# Patient Record
Sex: MALE | Race: WHITE | NOT HISPANIC OR LATINO | Employment: FULL TIME | URBAN - NONMETROPOLITAN AREA
[De-identification: names, ages, dates, MRNs, and addresses within clinical notes are randomized per-mention and may not be internally consistent; named-entity substitution may affect disease eponyms.]

---

## 2017-06-19 ENCOUNTER — OFFICE VISIT (OUTPATIENT)
Dept: RETAIL CLINIC | Facility: CLINIC | Age: 47
End: 2017-06-19

## 2017-06-19 VITALS
DIASTOLIC BLOOD PRESSURE: 95 MMHG | SYSTOLIC BLOOD PRESSURE: 134 MMHG | OXYGEN SATURATION: 97 % | TEMPERATURE: 98.2 F | HEART RATE: 92 BPM

## 2017-06-19 DIAGNOSIS — B37.0 ORAL THRUSH: Primary | ICD-10-CM

## 2017-06-19 LAB
EXPIRATION DATE: NORMAL
INTERNAL CONTROL: NORMAL
Lab: NORMAL
S PYO AG THROAT QL: NEGATIVE

## 2017-06-19 PROCEDURE — 99202 OFFICE O/P NEW SF 15 MIN: CPT | Performed by: NURSE PRACTITIONER

## 2017-06-19 PROCEDURE — 87880 STREP A ASSAY W/OPTIC: CPT | Performed by: NURSE PRACTITIONER

## 2017-06-19 NOTE — PROGRESS NOTES
Subjective   Tyler Fallon is a 46 y.o. male.     Sore Throat    This is a new problem. The current episode started 1 to 4 weeks ago (2 weeks). The problem has been gradually worsening. There has been no fever. Associated symptoms include congestion and coughing. Pertinent negatives include no diarrhea, headaches or vomiting. He has had no exposure to strep or mono. Treatments tried: Salt water gargles. The treatment provided no relief.        The following portions of the patient's history were reviewed and updated as appropriate: allergies, current medications, past family history, past medical history, past social history, past surgical history and problem list.    Review of Systems   Constitutional: Negative for fever.   HENT: Positive for congestion, postnasal drip and sore throat. Negative for sinus pressure.    Eyes: Negative for discharge and redness.   Respiratory: Positive for cough. Negative for chest tightness.    Cardiovascular: Negative for chest pain and palpitations.   Gastrointestinal: Negative for diarrhea, nausea and vomiting.   Allergic/Immunologic: Negative for environmental allergies.   Neurological: Negative for headaches.       Objective   Physical Exam   Constitutional: He appears well-developed and well-nourished. No distress.   HENT:   Right Ear: Tympanic membrane is not erythematous and not bulging.   Left Ear: Tympanic membrane is not erythematous and not bulging.   Mouth/Throat: Oral lesions present. No uvula swelling. Posterior oropharyngeal erythema present. No oropharyngeal exudate. Tonsils are 0 on the right. Tonsils are 0 on the left. No tonsillar exudate.   White patches to uvula, soft palate, and tongue.    Neck: Neck supple.   Cardiovascular: Normal rate, regular rhythm and normal heart sounds.  Exam reveals no gallop and no friction rub.    No murmur heard.  Pulmonary/Chest: Effort normal and breath sounds normal. No respiratory distress. He has no wheezes. He has no rales.  "  Lymphadenopathy:     He has no cervical adenopathy.   Neurological: He is alert.   Skin: Skin is warm. He is not diaphoretic.   Psychiatric: He has a normal mood and affect. His behavior is normal.       Assessment/Plan   Tyler was seen today for sore throat.    Diagnoses and all orders for this visit:    Oral thrush  -     POCT rapid strep A    Other orders  -     nystatin (MYCOSTATIN) 108350 UNIT/ML suspension; Swish and swallow 5 mL 4 (Four) Times a Day for 7 days.    Questioned patient regarding any inhaler use.  States his mother gave him a \"predisone inhaler\" for his asthma.  Has not been rinsing mouth.      Must rinse mouth out after using ANY steroid inhalers  If symptoms persist, or begin to worsen follow up with Urgent Care at hospital.       "

## 2017-06-19 NOTE — PATIENT INSTRUCTIONS
Must rinse mouth out after using ANY steroid inhalers  If symptoms persist, or begin to worsen follow up with Urgent Care at hospital.      Thrush, Adult  Thrush, also called oral candidiasis, is a fungal infection that develops in the mouth and throat and on the tongue. It causes white patches to form on the mouth and tongue. Thrush is most common in older adults, but it can occur at any age.   Many cases of thrush are mild, but this infection can also be more serious. Thrush can be a recurring problem for people who have chronic illnesses or who take medicines that limit the body's ability to fight infection. Because these people have difficulty fighting infections, the fungus that causes thrush can spread throughout the body. This can cause life-threatening blood or organ infections.  CAUSES   Thrush is usually caused by a yeast called Evelyn albicans. This fungus is normally present in small amounts in the mouth and on other mucous membranes. It usually causes no harm. However, when conditions are present that allow the fungus to grow uncontrolled, it invades surrounding tissues and becomes an infection. Less often, other Candida species can also lead to thrush.   RISK FACTORS  Thrush is more likely to develop in the following people:  · People with an impaired ability to fight infection (weakened immune system).    · Older adults.    · People with HIV.    · People with diabetes.    · People with dry mouth (xerostomia).    · Pregnant women.    · People with poor dental care, especially those who have false teeth.    · People who use antibiotic medicines.    SIGNS AND SYMPTOMS   Thrush can be a mild infection that causes no symptoms. If symptoms develop, they may include:   · A burning feeling in the mouth and throat. This can occur at the start of a thrush infection.    · White patches that adhere to the mouth and tongue. The tissue around the patches may be red, raw, and painful. If rubbed (during tooth  brushing, for example), the patches and the tissue of the mouth may bleed easily.    · A bad taste in the mouth or difficulty tasting foods.    · Cottony feeling in the mouth.    · Pain during eating and swallowing.  DIAGNOSIS   Your health care provider can usually diagnose thrush by looking in your mouth and asking you questions about your health.   TREATMENT   Medicines that help prevent the growth of fungi (antifungals) are the standard treatment for thrush. These medicines are either applied directly to the affected area (topical) or swallowed (oral). The treatment will depend on the severity of the condition.   Mild Thrush  Mild cases of thrush may clear up with the use of an antifungal mouth rinse or lozenges. Treatment usually lasts about 14 days.   Moderate to Severe Thrush  · More severe thrush infections that have spread to the esophagus are treated with an oral antifungal medicine. A topical antifungal medicine may also be used.    · For some severe infections, a treatment period longer than 14 days may be needed.    · Oral antifungal medicines are almost never used during pregnancy because the fetus may be harmed. However, if a pregnant woman has a rare, severe thrush infection that has spread to her blood, oral antifungal medicines may be used. In this case, the risk of harm to the mother and fetus from the severe thrush infection may be greater than the risk posed by the use of antifungal medicines.    Persistent or Recurrent Thrush  For cases of thrush that do not go away or keep coming back, treatment may involve the following:   · Treatment may be needed twice as long as the symptoms last.    · Treatment will include both oral and topical antifungal medicines.    · People with weakened immune systems can take an antifungal medicine on a continuous basis to prevent thrush infections.    It is important to treat conditions that make you more likely to get thrush, such as diabetes or HIV.   HOME CARE  INSTRUCTIONS   ·  Only take over-the-counter or prescription medicine as directed by your health care provider. Talk to your health care provider about an over-the-counter medicine called gentian violet, which kills bacteria and fungi.    · Eat plain, unflavored yogurt as directed by your health care provider. Check the label to make sure the yogurt contains live cultures. This yogurt can help healthy bacteria grow in the mouth that can stop the growth of the fungus that causes thrush.    · Try these measures to help reduce the discomfort of thrush:      Drink cold liquids such as water or iced tea.      Try flavored ice treats or frozen juices.      Eat foods that are easy to swallow, such as gelatin, ice cream, or custard.      If the patches in your mouth are painful, try drinking from a straw.    · Rinse your mouth several times a day with a warm saltwater rinse. You can make the saltwater mixture with 1 tsp (6 g) of salt in 8 fl oz (0.2 L) of warm water.  · If you wear dentures, remove the dentures before going to bed, brush them vigorously, and soak them in a cleaning solution as directed by your health care provider.    · Women who are breastfeeding should clean their nipples with an antifungal medicine as directed by their health care provider. Dry the nipples after breastfeeding. Applying lanolin-containing body lotion may help relieve nipple soreness.    SEEK MEDICAL CARE IF:  · Your symptoms are getting worse or are not improving within 7 days of starting treatment.    · You have symptoms of spreading infection, such as white patches on the skin outside of the mouth.    · You are nursing and you have redness, burning, or pain in the nipples that is not relieved with treatment.    MAKE SURE YOU:  · Understand these instructions.  · Will watch your condition.  · Will get help right away if you are not doing well or get worse.     This information is not intended to replace advice given to you by your health  care provider. Make sure you discuss any questions you have with your health care provider.     Document Released: 09/12/2005 Document Revised: 04/10/2017 Document Reviewed: 07/21/2014  [employer] Interactive Patient Education ©2017 [employer]

## 2018-02-08 ENCOUNTER — APPOINTMENT (OUTPATIENT)
Dept: GENERAL RADIOLOGY | Facility: HOSPITAL | Age: 48
End: 2018-02-08

## 2018-02-08 ENCOUNTER — HOSPITAL ENCOUNTER (EMERGENCY)
Facility: HOSPITAL | Age: 48
Discharge: HOME | End: 2018-02-08
Admitting: EMERGENCY MEDICINE

## 2018-02-08 VITALS
OXYGEN SATURATION: 99 % | HEIGHT: 70 IN | WEIGHT: 174 LBS | DIASTOLIC BLOOD PRESSURE: 94 MMHG | HEART RATE: 90 BPM | RESPIRATION RATE: 14 BRPM | BODY MASS INDEX: 24.91 KG/M2 | SYSTOLIC BLOOD PRESSURE: 137 MMHG | TEMPERATURE: 97.4 F

## 2018-02-08 DIAGNOSIS — S39.012A STRAIN OF LUMBAR REGION, INITIAL ENCOUNTER: Primary | ICD-10-CM

## 2018-02-08 PROCEDURE — 99283 EMERGENCY DEPT VISIT LOW MDM: CPT

## 2018-02-08 PROCEDURE — 72110 X-RAY EXAM L-2 SPINE 4/>VWS: CPT

## 2018-02-08 RX ORDER — TRAMADOL HYDROCHLORIDE 50 MG/1
50 TABLET ORAL EVERY 6 HOURS PRN
Qty: 15 TABLET | Refills: 0 | Status: SHIPPED | OUTPATIENT
Start: 2018-02-08 | End: 2018-02-08 | Stop reason: HOSPADM

## 2018-02-08 RX ORDER — CYCLOBENZAPRINE HCL 10 MG
10 TABLET ORAL 3 TIMES DAILY PRN
Qty: 20 TABLET | Refills: 0 | Status: SHIPPED | OUTPATIENT
Start: 2018-02-08 | End: 2018-02-08 | Stop reason: HOSPADM

## 2018-02-08 RX ORDER — NAPROXEN 500 MG/1
500 TABLET ORAL 2 TIMES DAILY PRN
Qty: 20 TABLET | Refills: 0 | Status: SHIPPED | OUTPATIENT
Start: 2018-02-08 | End: 2018-02-08 | Stop reason: HOSPADM

## 2018-02-08 NOTE — DISCHARGE INSTRUCTIONS
Return to ER if symptoms worsen   Ice to area for 24 hours, then moist heat compresses three times a day for 15-20 min intervals

## 2018-02-08 NOTE — ED NOTES
Pt states he hurt his back last week. Pt states he slipped in the mud. And back has been worse since. Pt denies numbness or tingling in hands or feet.     Verito Matos  02/08/18 1732       Verito Matos  02/08/18 1738

## 2018-02-08 NOTE — ED PROVIDER NOTES
Subjective   HPI Comments: Patient is a 47-year-old white male presents with low back pain.  He states he initially injured his back about a week ago when he slipped in some mud and twisted his back.  He states his back improved at that time and then 2 days ago he pulled his back again while climbing a ladder.  He did not fall.  He denies radicular symptoms.  He denies any incontinence of urine or stool.  He states that his pain is mostly in the muscles of the lower back.  He denies any other injury.    Patient is a 47 y.o. male presenting with back pain.   History provided by:  Patient   used: No    Back Pain       Review of Systems   Constitutional: Negative.    HENT: Negative.    Eyes: Negative.    Respiratory: Negative.    Cardiovascular: Negative.    Gastrointestinal: Negative.    Endocrine: Negative.    Genitourinary: Negative.    Musculoskeletal: Positive for back pain.        Patient is a 47-year-old white male presents with low back pain.  He states he initially injured his back about a week ago when he slipped in some mud and twisted his back.  He states his back improved at that time and then 2 days ago he pulled his back again while climbing a ladder.  He did not fall.  He denies radicular symptoms.  He denies any incontinence of urine or stool.  He states that his pain is mostly in the muscles of the lower back.  He denies any other injury.     Skin: Negative.    Allergic/Immunologic: Negative.    Neurological: Negative.    Hematological: Negative.    Psychiatric/Behavioral: Negative.    All other systems reviewed and are negative.      Past Medical History:   Diagnosis Date   • Acid reflux    • Asthma        Allergies   Allergen Reactions   • Eggs Or Egg-Derived Products Swelling       Past Surgical History:   Procedure Laterality Date   • KNEE ACL RECONSTRUCTION         Family History   Problem Relation Age of Onset   • Cancer Father    • Stroke Other        Social History  "    Social History   • Marital status:      Spouse name: N/A   • Number of children: N/A   • Years of education: N/A     Social History Main Topics   • Smoking status: Heavy Tobacco Smoker     Packs/day: 0.50     Types: Cigarettes   • Smokeless tobacco: None   • Alcohol use No   • Drug use: No   • Sexual activity: Defer     Other Topics Concern   • None     Social History Narrative       Prior to Admission medications    Not on File       /94 (BP Location: Right arm, Patient Position: Sitting)  Pulse 90  Temp 97.4 °F (36.3 °C) (Temporal Artery )   Resp 14  Ht 177.8 cm (70\")  Wt 78.9 kg (174 lb)  SpO2 99%  BMI 24.97 kg/m2    Objective   Physical Exam   Constitutional: He is oriented to person, place, and time. He appears well-developed and well-nourished.   HENT:   Head: Normocephalic and atraumatic.   Eyes: Conjunctivae and EOM are normal. Pupils are equal, round, and reactive to light.   Neck: Normal range of motion. Neck supple.   Cardiovascular: Normal rate, regular rhythm, normal heart sounds and intact distal pulses.    Pulmonary/Chest: Effort normal and breath sounds normal.   Abdominal: Soft. Bowel sounds are normal.   Musculoskeletal:   Moderate tenderness on palpation of paraspinal muscles of lower lumbar spine. No stepoff or laxity noted. dtrs intact.    Neurological: He is alert and oriented to person, place, and time. He has normal reflexes.   Skin: Skin is warm and dry.   Psychiatric: He has a normal mood and affect. His behavior is normal. Judgment and thought content normal.   Nursing note and vitals reviewed.      Procedures         Lab Results (last 24 hours)     ** No results found for the last 24 hours. **          XR Spine Lumbar 4+ View   ED Interpretation   Negative for anything acute- reviewed with dr he      Final Result   No acute osseous abnormality.   This report was finalized on 02/08/2018 17:50 by  Ronnell De La Cruz, .          ED Course  ED Course   Comment By Time "   Reviewed results with pt - will be discharged home shortly in stable condition to follow up with pcp in 3 days if no improvement- advised to return before if symptoms worsen Veronica Cueto, JAYLYN 02/08 1734   Went to discharge pt and he states that he has esophageal stricture and is unable to swallow pills. Will d/c prescriptions for ultram, naprosyn, and flexeril Veronica Cueto, APRN 02/08 1743          MDM  Number of Diagnoses or Management Options  Strain of lumbar region, initial encounter: minor     Amount and/or Complexity of Data Reviewed  Tests in the radiology section of CPT®: ordered and reviewed  Independent visualization of images, tracings, or specimens: yes    Patient Progress  Patient progress: stable      Final diagnoses:   Strain of lumbar region, initial encounter          JAYLYN Palacio  02/08/18 3313

## 2019-09-18 ENCOUNTER — HOSPITAL ENCOUNTER (EMERGENCY)
Facility: HOSPITAL | Age: 49
Discharge: HOME | End: 2019-09-18
Admitting: INTERNAL MEDICINE

## 2019-09-18 VITALS
WEIGHT: 148 LBS | HEIGHT: 70 IN | OXYGEN SATURATION: 98 % | SYSTOLIC BLOOD PRESSURE: 144 MMHG | HEART RATE: 78 BPM | RESPIRATION RATE: 17 BRPM | BODY MASS INDEX: 21.19 KG/M2 | DIASTOLIC BLOOD PRESSURE: 91 MMHG | TEMPERATURE: 98.2 F

## 2019-09-18 DIAGNOSIS — R13.10 DYSPHAGIA, UNSPECIFIED TYPE: Primary | ICD-10-CM

## 2019-09-18 LAB
ALBUMIN SERPL-MCNC: 4.4 G/DL (ref 3.5–5.2)
ALBUMIN/GLOB SERPL: 1.6 G/DL
ALP SERPL-CCNC: 87 U/L (ref 39–117)
ALT SERPL W P-5'-P-CCNC: 8 U/L (ref 1–41)
ANION GAP SERPL CALCULATED.3IONS-SCNC: 10 MMOL/L (ref 5–15)
AST SERPL-CCNC: 11 U/L (ref 1–40)
BASOPHILS # BLD AUTO: 0.07 10*3/MM3 (ref 0–0.2)
BASOPHILS NFR BLD AUTO: 1 % (ref 0–1.5)
BILIRUB SERPL-MCNC: 0.4 MG/DL (ref 0.2–1.2)
BUN BLD-MCNC: 11 MG/DL (ref 6–20)
BUN/CREAT SERPL: 9.1 (ref 7–25)
CALCIUM SPEC-SCNC: 9.4 MG/DL (ref 8.6–10.5)
CHLORIDE SERPL-SCNC: 101 MMOL/L (ref 98–107)
CK MB SERPL-CCNC: 1.11 NG/ML
CO2 SERPL-SCNC: 29 MMOL/L (ref 22–29)
CREAT BLD-MCNC: 1.21 MG/DL (ref 0.76–1.27)
DEPRECATED RDW RBC AUTO: 38.4 FL (ref 37–54)
EOSINOPHIL # BLD AUTO: 0.26 10*3/MM3 (ref 0–0.4)
EOSINOPHIL NFR BLD AUTO: 3.5 % (ref 0.3–6.2)
ERYTHROCYTE [DISTWIDTH] IN BLOOD BY AUTOMATED COUNT: 11.7 % (ref 12.3–15.4)
GFR SERPL CREATININE-BSD FRML MDRD: 65 ML/MIN/1.73
GLOBULIN UR ELPH-MCNC: 2.8 GM/DL
GLUCOSE BLD-MCNC: 109 MG/DL (ref 65–99)
HCT VFR BLD AUTO: 46.4 % (ref 37.5–51)
HGB BLD-MCNC: 16.1 G/DL (ref 13–17.7)
IMM GRANULOCYTES # BLD AUTO: 0.01 10*3/MM3 (ref 0–0.05)
IMM GRANULOCYTES NFR BLD AUTO: 0.1 % (ref 0–0.5)
LYMPHOCYTES # BLD AUTO: 2.36 10*3/MM3 (ref 0.7–3.1)
LYMPHOCYTES NFR BLD AUTO: 32.1 % (ref 19.6–45.3)
MCH RBC QN AUTO: 31.2 PG (ref 26.6–33)
MCHC RBC AUTO-ENTMCNC: 34.7 G/DL (ref 31.5–35.7)
MCV RBC AUTO: 89.9 FL (ref 79–97)
MONOCYTES # BLD AUTO: 0.62 10*3/MM3 (ref 0.1–0.9)
MONOCYTES NFR BLD AUTO: 8.4 % (ref 5–12)
NEUTROPHILS # BLD AUTO: 4.03 10*3/MM3 (ref 1.7–7)
NEUTROPHILS NFR BLD AUTO: 54.9 % (ref 42.7–76)
NRBC BLD AUTO-RTO: 0 /100 WBC (ref 0–0.2)
PLATELET # BLD AUTO: 264 10*3/MM3 (ref 140–450)
PMV BLD AUTO: 9.7 FL (ref 6–12)
POTASSIUM BLD-SCNC: 4.1 MMOL/L (ref 3.5–5.2)
PROT SERPL-MCNC: 7.2 G/DL (ref 6–8.5)
RBC # BLD AUTO: 5.16 10*6/MM3 (ref 4.14–5.8)
SODIUM BLD-SCNC: 140 MMOL/L (ref 136–145)
WBC NRBC COR # BLD: 7.35 10*3/MM3 (ref 3.4–10.8)

## 2019-09-18 PROCEDURE — 25010000002 GLUCAGON (HUMAN RECOMBINANT) 1 MG RECONSTITUTED SOLUTION: Performed by: PHYSICIAN ASSISTANT

## 2019-09-18 PROCEDURE — 82553 CREATINE MB FRACTION: CPT | Performed by: PHYSICIAN ASSISTANT

## 2019-09-18 PROCEDURE — 96372 THER/PROPH/DIAG INJ SC/IM: CPT

## 2019-09-18 PROCEDURE — 85025 COMPLETE CBC W/AUTO DIFF WBC: CPT | Performed by: PHYSICIAN ASSISTANT

## 2019-09-18 PROCEDURE — 80053 COMPREHEN METABOLIC PANEL: CPT | Performed by: PHYSICIAN ASSISTANT

## 2019-09-18 PROCEDURE — 99283 EMERGENCY DEPT VISIT LOW MDM: CPT

## 2019-09-18 RX ORDER — SODIUM CHLORIDE 0.9 % (FLUSH) 0.9 %
10 SYRINGE (ML) INJECTION AS NEEDED
Status: DISCONTINUED | OUTPATIENT
Start: 2019-09-18 | End: 2019-09-18 | Stop reason: HOSPADM

## 2019-09-18 RX ADMIN — GLUCAGON HYDROCHLORIDE 1 MG: KIT at 19:32

## 2019-09-18 RX ADMIN — SODIUM CHLORIDE 1000 ML: 9 INJECTION, SOLUTION INTRAVENOUS at 19:25

## 2019-09-18 NOTE — ED PROVIDER NOTES
Subjective   History of Present Illness    Patient is a 48-year-old male presenting to ED with difficulty swallowing.  Patient stated over the past 3 to 4 weeks he has had progressive difficulty with swallowing.  Patient noted he has a history of an esophageal stricture 20 years ago at which time he had a piece of chicken stuck in his throat.  Patient reported they were able to go in and pull the chicken out and he does not think he needed any dilation at that time.  Patient denies any complications or problems since then.  Patient does not regularly follow-up with GI and does not have an established PCP.  Patient stated 3  weeks ago he started to feel pain with swallowing solid foods.  Patient switched to a soft food diet including overcooked pasta and eggs until he was unable to swallow these as well.  Patient reported he has been able to tolerate PO liquids and denies any vomiting. Patient described that with his previous stricture he felt the food getting caught in his throat. Patient noted he now feels like the food and liquids are getting caught in the throat, in his mid sternum, and in his epigastric region.  Patient described he feels like when he swallows in these 3 areas the food or liquid slows down.  Patient has been able to tolerate putting in fluids which she has been taking to work.  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  Patient describes he is feeling weak and shaky and believes that he is becoming dehydrated. Patient reports his appetite has not decreased, he is just nervous to advanced to solid foods.     Patient denies any other chest pains, SOB, fevers, recent illness, bowel changes, or decreased urination. Patient denies any muscle cramping. Patient denies trying any medications at home to alleviate symptoms.     Review of Systems   Constitutional: Negative for activity  change, appetite change, chills and fever.   HENT: Positive for trouble swallowing. Negative for congestion and sore throat.    Eyes: Negative.    Respiratory: Negative.  Negative for cough, choking, chest tightness and shortness of breath.    Cardiovascular: Positive for chest pain (mid sub sternal).   Gastrointestinal: Positive for abdominal pain (epigastric region with swallowing only) and nausea. Negative for constipation, diarrhea and vomiting.   Genitourinary: Negative.  Negative for decreased urine volume, difficulty urinating, dysuria and flank pain.   Musculoskeletal: Negative.  Negative for back pain, neck pain and neck stiffness.   Skin: Negative.  Negative for rash and wound.   Neurological: Positive for weakness (generalized). Negative for dizziness, syncope and headaches.   Psychiatric/Behavioral: Negative.        Past Medical History:   Diagnosis Date   • Acid reflux    • Asthma        Allergies   Allergen Reactions   • Eggs Or Egg-Derived Products Swelling       Past Surgical History:   Procedure Laterality Date   • KNEE ACL RECONSTRUCTION         Family History   Problem Relation Age of Onset   • Cancer Father    • Stroke Other        Social History     Socioeconomic History   • Marital status:      Spouse name: Not on file   • Number of children: Not on file   • Years of education: Not on file   • Highest education level: Not on file   Tobacco Use   • Smoking status: Heavy Tobacco Smoker     Packs/day: 0.50     Types: Cigarettes   Substance and Sexual Activity   • Alcohol use: No   • Drug use: No   • Sexual activity: Defer           Objective   Physical Exam   Constitutional: He is oriented to person, place, and time. Vital signs are normal. He appears well-developed and well-nourished. He is cooperative. He is easily aroused.  Non-toxic appearance. No distress.   HENT:   Head: Normocephalic and atraumatic.   Right Ear: External ear normal.   Left Ear: External ear normal.   Mouth/Throat:  Uvula is midline. Mucous membranes are dry. No oropharyngeal exudate, posterior oropharyngeal edema, posterior oropharyngeal erythema or tonsillar abscesses. No tonsillar exudate.   Eyes: Conjunctivae and EOM are normal. Pupils are equal, round, and reactive to light. Right eye exhibits no nystagmus. Left eye exhibits no nystagmus.   Neck: Normal range of motion. Neck supple. No spinous process tenderness and no muscular tenderness present.   Cardiovascular: Normal rate, regular rhythm, normal heart sounds and normal pulses.   No murmur heard.  Pulses:       Radial pulses are 2+ on the right side, and 2+ on the left side.        Dorsalis pedis pulses are 2+ on the right side, and 2+ on the left side.        Posterior tibial pulses are 2+ on the right side, and 2+ on the left side.   Pulmonary/Chest: Effort normal and breath sounds normal. He has no decreased breath sounds. He has no wheezes. He has no rhonchi. He has no rales. He exhibits no tenderness.   Abdominal: Soft. Normal appearance and bowel sounds are normal. There is no tenderness. There is no rigidity, no rebound, no guarding and no CVA tenderness.   Musculoskeletal:   No cervical, thoracic, or lumbar spinal tenderness; no paraspinal muscular tenderness; no CVAT; full ROM of all four extremities; no calf tenderness   Neurological: He is alert, oriented to person, place, and time and easily aroused. He has normal strength. Gait normal.   Skin: Skin is warm, dry and intact. Capillary refill takes less than 2 seconds. No rash noted. He is not diaphoretic.   Psychiatric: His speech is normal and behavior is normal. Judgment and thought content normal. Cognition and memory are normal.   Slightly anxious upon initial exam, pacing around room but will cooperate easily when asked   Nursing note and vitals reviewed.      Procedures           ED Course  ED Course as of Sep 19 0309   Wed Sep 18, 2019   1910 Discussed with Dr. Daniel Narayan patient's initial  "evaluation and treatment plan.  Dr. Narayan recommends adding on a CK at this time to help rule out rhabdomyolysis secondary to dehydration.  Dr. Narayan otherwise agrees with treatment plan.  [JS]   2038 Patient resting in room reporting he feels better after the glucagon and IV fluids. Patient able to tolerate PO gatorade and keep the liquids down. Patient noted he feels it gets \"caught or slowed\" in the epigastric region. Patient denies any nausea or dry heaving associated with the PO intake. Discussed with patient lab results. Updated Dr. Narayan on patient's improvement and ability to tolerate PO fluids. Dr. Narayan agrees with ability to discharge home at this time with PCP follow up to establish GI care with need for endoscopy.   [JS]   2115 Patient able to tolerate more PO liquids with no nausea or vomiting. Discussed with patient need for PCP and GI follow up, return precautions, and ability to return to ED as needed. Discussed with patient soft food alternatives other than pudding. Patient with no concerns at this time and feels comfortable going home. Will discharge.   [JS]      ED Course User Index  [JS] Efrain Rodriguez PA-C                  [facility]    Final diagnoses:   Dysphagia, unspecified type              Efrain Rodriguez PA-C  09/19/19 0309    "

## 2019-09-19 NOTE — DISCHARGE INSTRUCTIONS
Follow up with one of the [facility] physician groups below to setup primary care. If you have trouble making an appointment, please call the [facility] Nurse Line at [phone]    Dr. Arely Yen DO, Dr. Parish Carr DO, and JAYLYN Arana  [facility]  [address] [place] [address], [id number] [phone]    Dr. Carloz Hernandez MD  [facility] - [address] [address], [address], [place], [place] [id number] [phone]    Dr. Adalberto Flynn DO, Dr. Andrew Billy DO,  JAYLYN Hylton, and JAYLYN Suh  [facility] - [address] [address], [address], [place], [place] [id number] [phone]     Dr. Magaly Velarde MD, and JAYLYN Henderson  [address] [id number] [phone]    Dr. Roger Campbell MD and Dr. Jesse Lemon MD  [address], [address] [phone]    Dr. Jhonny Lerma MD  [facility]  [address] [place] [address], [facility] [place], [address] [phone]    Dr. Bernard Reed MD  [facility]  [address], [place], [address] [phone]              Dysphagia    Dysphagia is trouble swallowing. This condition occurs when solids and liquids stick in a person's throat on the way down to the stomach, or when food takes longer to get to the stomach. You may have problems swallowing food, liquids, or both. You may also have pain while trying to swallow. It may take you more time and effort to swallow something.  What are the causes?  This condition is caused by:  · Problems with the muscles. They may make it difficult for you to move food and liquids through the tube that connects your  mouth to your stomach (esophagus). You may have ulcers, scar tissue, or inflammation that blocks the normal passage of food and liquids. Causes of these problems include:  ? Acid reflux from your stomach into your esophagus (gastroesophageal reflux).  ? Infections.  ? Radiation treatment for cancer.  ? Medicines taken without enough fluids to wash them down into your stomach.  · Nerve problems. These prevent signals from being sent to the muscles of your esophagus to squeeze (contract) and move what you swallow down to your stomach.  · Globus pharyngeus. This is a common problem that involves feeling like something is stuck in the throat or a sense of trouble with swallowing even though nothing is wrong with the swallowing passages.  · Stroke. This can affect the nerves and make it difficult to swallow.  · Certain conditions, such as cerebral palsy or Parkinson disease.  What are the signs or symptoms?  Common symptoms of this condition include:  · A feeling that solids or liquids are stuck in your throat on the way down to the stomach.  · Food taking too long to get to the stomach.  Other symptoms include:  · Food moving back from your stomach to your mouth (regurgitation).  · Noises coming from your throat.  · Chest discomfort with swallowing.  · A feeling of fullness when swallowing.  · Drooling, especially when the throat is blocked.  · Pain while swallowing.  · Heartburn.  · Coughing or gagging while trying to swallow.  How is this diagnosed?  This condition is diagnosed by:  · Barium X-ray. In this test, you swallow a white substance (contrast medium)that sticks to the inside of your esophagus. X-ray images are then taken.  · Endoscopy. In this test, a flexible telescope is inserted down your throat to look at your esophagus and your stomach.  · CT scans and MRI.  How is this treated?  Treatment for dysphagia depends on the cause of the condition:  · If the dysphagia is caused by acid reflux or infection,  medicines may be used. They may include antibiotics and heartburn medicines.  · If the dysphagia is caused by problems with your muscles, swallowing therapy may be used to help you strengthen your swallowing muscles. You may have to do specific exercises to strengthen the muscles or stretch them.  · If the dysphagia is caused by a blockage or mass, procedures to remove the blockage may be done. You may need surgery and a feeding tube.  You may need to make diet changes. Ask your health care provider for specific instructions.  Follow these instructions at home:  Eating and drinking  · Try to eat soft food that is easier to swallow.  · Follow any diet changes as told by your health care provider.  · Cut your food into small pieces and eat slowly.  · Eat and drink only when you are sitting upright.  · Do not drink alcohol or caffeine. If you need help quitting, ask your health care provider.  General instructions  · Check your weight every day to make sure you are not losing weight.  · Take over-the-counter and prescription medicines only as told by your health care provider.  · If you were prescribed an antibiotic medicine, take it as told by your health care provider. Do not stop taking the antibiotic even if you start to feel better.  · Do not use any products that contain nicotine or tobacco, such as cigarettes and e-cigarettes. If you need help quitting, ask your health care provider.  · Keep all follow-up visits as told by your health care provider. This is important.  Contact a health care provider if:  · You lose weight because you cannot swallow.  · You cough when you drink liquids (aspiration).  · You cough up partially digested food.  Get help right away if:  · You cannot swallow your saliva.  · You have shortness of breath or a fever, or both.  · You have a hoarse voice and also have trouble swallowing.  Summary  · Dysphagia is trouble swallowing. This condition occurs when solids and liquids stick in a  person's throat on the way down to the stomach, or when food takes longer to get to the stomach.  · Dysphagia has many possible causes and symptoms.  · Treatment for dysphagia depends on the cause of the condition.  This information is not intended to replace advice given to you by your health care provider. Make sure you discuss any questions you have with your health care provider.  Document Released: 12/15/2001 Document Revised: 12/07/2017 Document Reviewed: 12/07/2017  [employer] Interactive Patient Education © 2019 [employer]      Dysphagia Eating Plan, Bite Size Food  This diet plan is for people with moderate swallowing problems who have transitioned from pureed and minced foods. Bite size foods are soft and cut into small chunks so that they can be swallowed safely. On this eating plan, you may be instructed to drink liquids that are thickened.  Work with your health care provider and your diet and nutrition specialist (dietitian) to make sure that you are following the diet safely and getting all the nutrients you need.  What are tips for following this plan?  General guidelines for foods    · You may eat foods that are tender, soft, and moist.  · Always test food texture before taking a bite. Poke food with a fork or spoon to make sure it is tender.  · Food should be easy to cut and shew. Avoid large pieces of food that require a lot of chewing.  · Take small bites. Each bite should be smaller than your thumb nail (about 15mm by 15 mm).  · If you were on pureed and minced food diet plans, you may eat any of the foods included in those diets.  · Avoid foods that are very dry, hard, sticky, chewy, coarse, or crunchy.  · If instructed by your health care provider, thicken liquids. Follow your health care provider's instructions for what products to use, how to do this, and to what thickness.  ? Your health care provider may recommend using a commercial thickener, rice cereal, or potato flakes. Ask your  health care provider to recommend thickeners.  ? Thickened liquids are usually a “pudding-like” consistency, or they may be as thick as honey or thick enough to eat with a spoon.  Cooking  · To moisten foods, you may add liquids while you are blending, mashing, or grinding your foods to the right consistency. These liquids include gravies, sauces, vegetable or fruit juice, milk, half and half, or water.  · Strain extra liquid from foods before eating.  · Reheat foods slowly to prevent a tough crust from forming.  · Prepare foods in advance.  Meal planning  · Eat a variety of foods to get all the nutrients you need.  · Some foods may be tolerated better than others. Work with your dietitian to identify which foods are safest for you to eat.  · Follow your meal plan as told by your dietitian.  What foods are allowed?  Grains  Moist breads without nuts or seeds. Biscuits, muffins, pancakes, and waffles that are well-moistened with syrup, jelly, margarine, or butter. Cooked cereals. Moist bread stuffing. Moist rice. Well-moistened cold cereal with small chunks. Well-cooked pasta, noodles, rice, and bread dressing in small pieces and thick sauce. Soft dumplings or spaetzle in small pieces and butter or gravy.  Vegetables  Soft, well-cooked vegetables in small pieces. Soft-cooked, mashed potatoes. Thickened vegetable juice.  Fruits  Canned or cooked fruits that are soft or moist and do not have skin or seeds. Fresh, soft bananas. Thickened fruit juices.  Meat and other protein foods  Tender, moist meats or poultry in small pieces. Moist meatballs or meatloaf. Fish without bones. Eggs or egg substitutes in small pieces. Tofu. Tempeh and meat alternatives in small pieces. Well-cooked, tender beans, peas, baked beans, and other legumes.  Dairy  Thickened milk. Cream cheese. Yogurt. Cottage cheese. Sour cream. Small pieces of soft cheese.  Fats and oils  Butter. Oils. Margarine. Mayonnaise. Gravy. Spreads.  Sweets and  desserts  Soft, smooth, moist desserts. Pudding. Custard. Moist cakes. Jam. Jelly. Honey. Preserves. Ask your health care provider whether you can have frozen desserts.  Seasoning and other foods  All seasonings and sweeteners. All sauces with small chunks. Prepared tuna, egg, or chicken salad without raw fruits or vegetables. Moist casseroles with small, tender pieces of meat. Soups with tender meat.  What foods are not allowed?  Grains  Coarse or dry cereals. Dry breads. Toast. Crackers. Tough, crusty breads, such as Jamaican bread and baguettes. Dry pancakes, waffles, and muffins. Sticky rice. Dry bread stuffing. Granola. Popcorn. Chips.  Vegetables  All raw vegetables. Cooked corn. Rubbery or stiff cooked vegetables. Stringy vegetables, such as celery. Tough, crisp fried potatoes. Potato skins.  Fruits  Hard, crunchy, stringy, high-pulp, and juicy raw fruits such as apples, pineapple, papaya, and watermelon. Small, round fruits, such as grapes. Dried fruit and fruit leather.  Meat and other protein foods  Large pieces of meat. Dry, tough meats, such as kang, sausage, and hot dogs. Chicken, turkey, or fish with skin and bones. Crunchy peanut butter. Nuts. Seeds. Nut and seed butters.  Dairy  Yogurt with nuts, seeds, or large chunks. Large chunks of cheese. Frozen desserts and milk consistency not allowed by your dietitian.  Sweets and desserts  Dry cakes. Chewy or dry cookies. Any desserts with nuts, seeds, dry fruits, coconut, pineapple, or anything dry, sticky, or hard. Chewy caramel. Licorice. Taffy-type candies. Ask your health care provider whether you can have frozen desserts.  Seasoning and other foods  Soups with tough or large chunks of meats, poultry, or vegetables. Corn or clam chowder. Smoothies with large chunks of fruit.  Summary  · Bite size foods can be helpful for people with moderate swallowing problems.  · On the dysphagia eating plan, you may eat foods that are soft, moist, and cut into pieces  smaller than 15mm by 15mm.  · You may be instructed to thicken liquids. Follow your health care provider's instructions about how to do this and to what consistency.  This information is not intended to replace advice given to you by your health care provider. Make sure you discuss any questions you have with your health care provider.  Document Released: 12/18/2006 Document Revised: 03/30/2018 Document Reviewed: 03/30/2018  [employer] Interactive Patient Education © 2019 [employer] Inc.

## 2019-09-25 ENCOUNTER — OFFICE VISIT (OUTPATIENT)
Dept: FAMILY MEDICINE CLINIC | Facility: CLINIC | Age: 49
End: 2019-09-25

## 2019-09-25 VITALS
BODY MASS INDEX: 21.56 KG/M2 | OXYGEN SATURATION: 98 % | TEMPERATURE: 97.7 F | HEART RATE: 80 BPM | DIASTOLIC BLOOD PRESSURE: 83 MMHG | RESPIRATION RATE: 18 BRPM | SYSTOLIC BLOOD PRESSURE: 122 MMHG | HEIGHT: 70 IN | WEIGHT: 150.6 LBS

## 2019-09-25 DIAGNOSIS — R63.4 WEIGHT LOSS: Primary | ICD-10-CM

## 2019-09-25 DIAGNOSIS — R13.10 DYSPHAGIA, UNSPECIFIED TYPE: ICD-10-CM

## 2019-09-25 PROCEDURE — 99214 OFFICE O/P EST MOD 30 MIN: CPT | Performed by: NURSE PRACTITIONER

## 2019-09-25 NOTE — PROGRESS NOTES
"CC: weight loss    Tyler Fallon is a 48 yr old male here for referral to gastro.  He says that he had a problem 20 yrs ago where food got caught and they had to go in and correct the problem in the throat. He says he had problems if he didn't chew the food good.  About a month ago he started having problems down low in the esophagus but now he can't keep anything but liquids and pudding down.  He says he has lost 20 pounds since this started.  He was seen in ER 9/16/19 and weighed 148 and today he ways 150 but insists that he has lost 20 pounds.  He says now he is having problems with liquids too that started 4 days ago.  Says when he went to ER they were ruling out rhabdomyolysis secondary to dehydrations.  He says he was able to keep liquids down and after they gave him IV fluids he was sent home.  He says he doesn't really vomit, but when food gets caught in his throat he has to cough and continue until he expels it.  He wants to see a GI doctor he does not want me to order tests.       Discussed with Dr. Daniel Narayan patient's initial evaluation and treatment plan.  Dr. Narayan recommends adding on a CK at this time to help rule out rhabdomyolysis secondary to dehydration.  Dr. Narayan otherwise agrees with treatment plan.  (JS)   2038 Patient resting in room reporting he feels better after the glucagon and IV fluids. Patient able to tolerate PO gatorade and keep the liquids down. Patient noted he feels it gets \"caught or slowed\" in the epigastric region. Patient denies any nausea or dry heaving associated with the PO intake. Discussed with patient lab results. Updated Dr. Narayan on patient's improvement and ability to tolerate PO fluids. Dr. Narayan agrees with ability to discharge home at this time with PCP follow up to establish GI care with need for endoscopy.   (JS)  2115 Patient able to tolerate more PO liquids with no nausea or vomiting. Discussed with patient need for PCP and GI follow up, return " precautions, and ability to return to ED as needed. Discussed with patient soft food alternatives other than pudding. Patient with no concerns at this time and feels comfortable going home. Will discharge.   (JS)          Weight Loss   This is a new problem. The current episode started more than 1 month ago. The problem occurs 2 to 4 times per day. The problem has been gradually worsening. Associated symptoms include fatigue and weakness. Pertinent negatives include no anorexia, chest pain, chills, coughing or nausea. The symptoms are aggravated by eating, smoking and drinking. He has tried position changes for the symptoms. The treatment provided mild relief.   GI Problem   The primary symptoms include weight loss and fatigue. Primary symptoms do not include nausea. The problem has been rapidly worsening.   The illness is also significant for dysphagia. The illness does not include chills, anorexia, bloating, constipation or itching. Significant associated medical issues include GERD.        The following portions of the patient's history were reviewed and updated as appropriate: allergies, current medications, past family history, past medical history, past social history, past surgical history and problem list.    Review of Systems   Constitutional: Positive for fatigue and unexpected weight loss. Negative for chills.   Respiratory: Negative for apnea, cough, chest tightness and shortness of breath.    Cardiovascular: Negative for chest pain, palpitations and leg swelling.   Gastrointestinal: Positive for dysphagia. Negative for anorexia, bloating, constipation and nausea.        Dysphagia     Skin: Negative for itching.   Allergic/Immunologic: Negative.    Neurological: Positive for weakness.   All other systems reviewed and are negative.      Objective   Physical Exam   Constitutional: He is oriented to person, place, and time. Vital signs are normal. He appears well-developed and well-nourished. He is  cooperative.   HENT:   Head: Normocephalic and atraumatic.   Right Ear: Hearing normal.   Left Ear: Hearing normal.   Nose: Nose normal.   Mouth/Throat: Oropharynx is clear and moist.   Cardiovascular: Normal rate, regular rhythm and normal heart sounds.   Pulmonary/Chest: Effort normal and breath sounds normal.   Abdominal: Soft. Normal appearance and bowel sounds are normal.   Lymphadenopathy:        Head (right side): No submental, no submandibular and no tonsillar adenopathy present.        Head (left side): No submental, no submandibular and no tonsillar adenopathy present.   Neurological: He is alert and oriented to person, place, and time. No cranial nerve deficit or sensory deficit.   Skin: Skin is warm, dry and intact.   Psychiatric: He has a normal mood and affect. His speech is normal and behavior is normal. Judgment normal. Cognition and memory are normal.   Nursing note and vitals reviewed.        Assessment/Plan   Tyler was seen today for weight loss.    Diagnoses and all orders for this visit:    Weight loss  -     Ambulatory Referral to Gastroenterology  -    I have encouraged him to try ensure, or Glucerna and he states he can't stomach them he does not like them  -    We discussed megace, he says he wants referral to GI so he can find out what is wrong with him and solve the problem    Dysphagia, unspecified type  -     Ambulatory Referral to Gastroenterology  -     Continue trying to keep pudding, and liquids down.       Return if symptoms worsen or fail to improve.    Electronically signed by Codi Castillo DNP, APRN, 09/25/19, 11:24 AM.

## 2019-09-26 ENCOUNTER — OFFICE VISIT (OUTPATIENT)
Dept: GASTROENTEROLOGY | Facility: CLINIC | Age: 49
End: 2019-09-26

## 2019-09-26 VITALS
OXYGEN SATURATION: 96 % | WEIGHT: 152 LBS | HEART RATE: 69 BPM | HEIGHT: 70 IN | SYSTOLIC BLOOD PRESSURE: 122 MMHG | BODY MASS INDEX: 21.76 KG/M2 | DIASTOLIC BLOOD PRESSURE: 80 MMHG

## 2019-09-26 DIAGNOSIS — Z71.6 TOBACCO ABUSE COUNSELING: ICD-10-CM

## 2019-09-26 DIAGNOSIS — R13.19 ESOPHAGEAL DYSPHAGIA: Primary | ICD-10-CM

## 2019-09-26 PROCEDURE — 99214 OFFICE O/P EST MOD 30 MIN: CPT | Performed by: CLINICAL NURSE SPECIALIST

## 2019-09-26 NOTE — PROGRESS NOTES
Tyler Fallon  1970 9/26/2019  Chief Complaint   Patient presents with   • GI Problem     New patient ref by Codi Castillo NP for weigh loss and problems swallowing     Subjective   HPI  Tyler Fallon is a 48 y.o. male who presents with a complaint of difficulty swallowing/dysphagia esophageal region. Severe episode leading him to the ER on 9/19/2019. He has a hx of this with food bolus, 20 years ago with removal at that time. He is living on fluids and liquids since that time. He has to regurgitate to get relief. He has lost 20 lbs in the last month due to not being able to eat. He also works outside and is very busy. He was treated with the ER with glucogon and IV fluids and discharged with follow up here.   No change in bowels. No BRBPR. No melena. No family hx for colon cancer.      Past Medical History:   Diagnosis Date   • Acid reflux    • Asthma      Past Surgical History:   Procedure Laterality Date   • KNEE ACL RECONSTRUCTION         No outpatient medications have been marked as taking for the 9/26/19 encounter (Office Visit) with Ailyn Molina APRN.     Allergies   Allergen Reactions   • Eggs Or Egg-Derived Products Swelling     Social History     Socioeconomic History   • Marital status:      Spouse name: Not on file   • Number of children: Not on file   • Years of education: Not on file   • Highest education level: Not on file   Tobacco Use   • Smoking status: Heavy Tobacco Smoker     Packs/day: 1.00     Types: Cigarettes   • Smokeless tobacco: Current User     Types: Chew   Substance and Sexual Activity   • Alcohol use: No   • Drug use: No   • Sexual activity: Defer     Family History   Problem Relation Age of Onset   • Cancer Father    • Stroke Other    • Colon polyps Neg Hx    • Colon cancer Neg Hx      Health Maintenance   Topic Date Due   • ANNUAL PHYSICAL  11/28/1975   • PNEUMOCOCCAL VACCINE (19-64 MEDIUM RISK) (1 of 1 - PPSV23) 11/28/1991   • TDAP/TD VACCINES (1 -  "Tdap) 11/28/1991   • INFLUENZA VACCINE  08/01/2019     Review of Systems   Constitutional: Negative for activity change, appetite change, chills, diaphoresis, fatigue, fever and unexpected weight change.   HENT: Negative for ear pain, hearing loss, mouth sores, sore throat, trouble swallowing and voice change.    Eyes: Negative.    Respiratory: Negative for cough, choking, shortness of breath and wheezing.    Cardiovascular: Negative for chest pain and palpitations.   Gastrointestinal: Negative for abdominal pain, blood in stool, constipation, diarrhea, nausea and vomiting.   Endocrine: Negative for cold intolerance and heat intolerance.   Genitourinary: Negative for decreased urine volume, dysuria, frequency, hematuria and urgency.   Musculoskeletal: Negative for back pain, gait problem and myalgias.   Skin: Negative for color change, pallor and rash.   Allergic/Immunologic: Negative for food allergies and immunocompromised state.   Neurological: Negative for dizziness, tremors, seizures, syncope, weakness, light-headedness, numbness and headaches.   Hematological: Negative for adenopathy. Does not bruise/bleed easily.   Psychiatric/Behavioral: Negative for agitation and confusion. The patient is not nervous/anxious.    All other systems reviewed and are negative.    Objective   Vitals:    09/26/19 1302   BP: 122/80   Pulse: 69   SpO2: 96%   Weight: 68.9 kg (152 lb)   Height: 177.8 cm (70\")     Body mass index is 21.81 kg/m².  Physical Exam   Constitutional: He is oriented to person, place, and time. He appears well-developed and well-nourished.   HENT:   Head: Normocephalic and atraumatic.   Eyes: Pupils are equal, round, and reactive to light.   Neck: Normal range of motion. Neck supple. No tracheal deviation present.   Cardiovascular: Normal rate, regular rhythm and normal heart sounds. Exam reveals no gallop and no friction rub.   No murmur heard.  Pulmonary/Chest: Effort normal and breath sounds normal. No " respiratory distress. He has no wheezes. He has no rales. He exhibits no tenderness.   Abdominal: Soft. Bowel sounds are normal. He exhibits no distension. There is no hepatosplenomegaly. There is no tenderness. There is no rigidity, no rebound and no guarding.   Musculoskeletal: Normal range of motion. He exhibits no edema, tenderness or deformity.   Neurological: He is alert and oriented to person, place, and time. He has normal reflexes.   Skin: Skin is warm and dry. No rash noted. No pallor.   Psychiatric: He has a normal mood and affect. His behavior is normal. Judgment and thought content normal.     Assessment/Plan   Tyler was seen today for gi problem.    Diagnoses and all orders for this visit:    Esophageal dysphagia  -     Case Request; Standing  -     Implement Anesthesia Orders Day of Procedure; Standing  -     Obtain Informed Consent; Standing  -     Case Request    Tobacco abuse counseling      ESOPHAGOGASTRODUODENOSCOPY WITH ANESTHESIA (N/A)  EMR Dragon/transcription disclaimer: Much of this encounter note is electronic transcription/translation of spoken language to printed text. The electronic translation of spoken language may be erroneous, or at times, nonsensical words or phrases may be inadvertently transcribed. Although I have reviewed the note for such errors, some may still exist.  Body mass index is 21.81 kg/m².  No Follow-up on file.    Patient's Body mass index is 21.81 kg/m². BMI is within normal parameters. No follow-up required..      All risks, benefits, alternatives, and indications of colonoscopy and/or Endoscopy procedure have been discussed with the patient. Risks to include perforation of the colon requiring possible surgery or colostomy, risk of bleeding from biopsies or removal of colon tissue, possibility of missing a colon polyp or cancer, or adverse drug reaction.  Benefits to include the diagnosis and management of disease of the colon and rectum. Alternatives to include  barium enema, radiographic evaluation, lab testing or no intervention. Pt verbalizes understanding and agrees.     Ailyn Dang Jesse, APRN  9/26/2019  1:22 PM      Obesity, Adult  Obesity is the condition of having too much total body fat. Being overweight or obese means that your weight is greater than what is considered healthy for your body size. Obesity is determined by a measurement called BMI. BMI is an estimate of body fat and is calculated from height and weight. For adults, a BMI of 30 or higher is considered obese.  Obesity can eventually lead to other health concerns and major illnesses, including:  · Stroke.  · Coronary artery disease (CAD).  · Type 2 diabetes.  · Some types of cancer, including cancers of the colon, breast, uterus, and gallbladder.  · Osteoarthritis.  · High blood pressure (hypertension).  · High cholesterol.  · Sleep apnea.  · Gallbladder stones.  · Infertility problems.  What are the causes?  The main cause of obesity is taking in (consuming) more calories than your body uses for energy. Other factors that contribute to this condition may include:  · Being born with genes that make you more likely to become obese.  · Having a medical condition that causes obesity. These conditions include:  ¨ Hypothyroidism.  ¨ Polycystic ovarian syndrome (PCOS).  ¨ Binge-eating disorder.  ¨ Cushing syndrome.  · Taking certain medicines, such as steroids, antidepressants, and seizure medicines.  · Not being physically active (sedentary lifestyle).  · Living where there are limited places to exercise safely or buy healthy foods.  · Not getting enough sleep.  What increases the risk?  The following factors may increase your risk of this condition:  · Having a family history of obesity.  · Being a woman of -American descent.  · Being a man of  descent.  What are the signs or symptoms?  Having excessive body fat is the main symptom of this condition.  How is this diagnosed?  This  condition may be diagnosed based on:  · Your symptoms.  · Your medical history.  · A physical exam. Your health care provider may measure:  ¨ Your BMI. If you are an adult with a BMI between 25 and less than 30, you are considered overweight. If you are an adult with a BMI of 30 or higher, you are considered obese.  ¨ The distances around your hips and your waist (circumferences). These may be compared to each other to help diagnose your condition.  ¨ Your skinfold thickness. Your health care provider may gently pinch a fold of your skin and measure it.  How is this treated?  Treatment for this condition often includes changing your lifestyle. Treatment may include some or all of the following:  · Dietary changes. Work with your health care provider and a dietitian to set a weight-loss goal that is healthy and reasonable for you. Dietary changes may include eating:  ¨ Smaller portions. A portion size is the amount of a particular food that is healthy for you to eat at one time. This varies from person to person.  ¨ Low-calorie or low-fat options.  ¨ More whole grains, fruits, and vegetables.  · Regular physical activity. This may include aerobic activity (cardio) and strength training.  · Medicine to help you lose weight. Your health care provider may prescribe medicine if you are unable to lose 1 pound a week after 6 weeks of eating more healthily and doing more physical activity.  · Surgery. Surgical options may include gastric banding and gastric bypass. Surgery may be done if:  ¨ Other treatments have not helped to improve your condition.  ¨ You have a BMI of 40 or higher.  ¨ You have life-threatening health problems related to obesity.  Follow these instructions at home:     Eating and drinking     · Follow recommendations from your health care provider about what you eat and drink. Your health care provider may advise you to:  ¨ Limit fast foods, sweets, and processed snack foods.  ¨ Choose low-fat options,  such as low-fat milk instead of whole milk.  ¨ Eat 5 or more servings of fruits or vegetables every day.  ¨ Eat at home more often. This gives you more control over what you eat.  ¨ Choose healthy foods when you eat out.  ¨ Learn what a healthy portion size is.  ¨ Keep low-fat snacks on hand.  ¨ Avoid sugary drinks, such as soda, fruit juice, iced tea sweetened with sugar, and flavored milk.  ¨ Eat a healthy breakfast.  · Drink enough water to keep your urine clear or pale yellow.  · Do not go without eating for long periods of time (do not fast) or follow a fad diet. Fasting and fad diets can be unhealthy and even dangerous.  Physical Activity   · Exercise regularly, as told by your health care provider. Ask your health care provider what types of exercise are safe for you and how often you should exercise.  · Warm up and stretch before being active.  · Cool down and stretch after being active.  · Rest between periods of activity.  Lifestyle   · Limit the time that you spend in front of your TV, computer, or video game system.  · Find ways to reward yourself that do not involve food.  · Limit alcohol intake to no more than 1 drink a day for nonpregnant women and 2 drinks a day for men. One drink equals 12 oz of beer, 5 oz of wine, or 1½ oz of hard liquor.  General instructions   · Keep a weight loss journal to keep track of the food you eat and how much you exercise you get.  · Take over-the-counter and prescription medicines only as told by your health care provider.  · Take vitamins and supplements only as told by your health care provider.  · Consider joining a support group. Your health care provider may be able to recommend a support group.  · Keep all follow-up visits as told by your health care provider. This is important.  Contact a health care provider if:  · You are unable to meet your weight loss goal after 6 weeks of dietary and lifestyle changes.  This information is not intended to replace advice  given to you by your health care provider. Make sure you discuss any questions you have with your health care provider.  Document Released: 01/25/2006 Document Revised: 05/22/2017 Document Reviewed: 10/05/2016  [employer] Interactive Patient Education © 2017 [employer]      If you smoke or use tobacco, 4 minutes reading provided  Steps to Quit Smoking  Smoking tobacco can be harmful to your health and can affect almost every organ in your body. Smoking puts you, and those around you, at risk for developing many serious chronic diseases. Quitting smoking is difficult, but it is one of the best things that you can do for your health. It is never too late to quit.  What are the benefits of quitting smoking?  When you quit smoking, you lower your risk of developing serious diseases and conditions, such as:  · Lung cancer or lung disease, such as COPD.  · Heart disease.  · Stroke.  · Heart attack.  · Infertility.  · Osteoporosis and bone fractures.  Additionally, symptoms such as coughing, wheezing, and shortness of breath may get better when you quit. You may also find that you get sick less often because your body is stronger at fighting off colds and infections. If you are pregnant, quitting smoking can help to reduce your chances of having a baby of low birth weight.  How do I get ready to quit?  When you decide to quit smoking, create a plan to make sure that you are successful. Before you quit:  · Pick a date to quit. Set a date within the next two weeks to give you time to prepare.  · Write down the reasons why you are quitting. Keep this list in places where you will see it often, such as on your bathroom mirror or in your car or wallet.  · Identify the people, places, things, and activities that make you want to smoke (triggers) and avoid them. Make sure to take these actions:  ¨ Throw away all cigarettes at home, at work, and in your car.  ¨ Throw away smoking accessories, such as ashtrays and  lighters.  ¨ Clean your car and make sure to empty the ashtray.  ¨ Clean your home, including curtains and carpets.  · Tell your family, friends, and coworkers that you are quitting. Support from your loved ones can make quitting easier.  · Talk with your health care provider about your options for quitting smoking.  · Find out what treatment options are covered by your health insurance.  What strategies can I use to quit smoking?  Talk with your healthcare provider about different strategies to quit smoking. Some strategies include:  · Quitting smoking altogether instead of gradually lessening how much you smoke over a period of time. Research shows that quitting “cold turkey” is more successful than gradually quitting.  · Attending in-person counseling to help you build problem-solving skills. You are more likely to have success in quitting if you attend several counseling sessions. Even short sessions of 10 minutes can be effective.  · Finding resources and support systems that can help you to quit smoking and remain smoke-free after you quit. These resources are most helpful when you use them often. They can include:  ¨ Online chats with a counselor.  ¨ Telephone quitlines.  ¨ Printed self-help materials.  ¨ Support groups or group counseling.  ¨ Text messaging programs.  ¨ Mobile phone applications.  · Taking medicines to help you quit smoking. (If you are pregnant or breastfeeding, talk with your health care provider first.) Some medicines contain nicotine and some do not. Both types of medicines help with cravings, but the medicines that include nicotine help to relieve withdrawal symptoms. Your health care provider may recommend:  ¨ Nicotine patches, gum, or lozenges.  ¨ Nicotine inhalers or sprays.  ¨ Non-nicotine medicine that is taken by mouth.  Talk with your health care provider about combining strategies, such as taking medicines while you are also receiving in-person counseling. Using these two  strategies together makes you more likely to succeed in quitting than if you used either strategy on its own.  If you are pregnant or breastfeeding, talk with your health care provider about finding counseling or other support strategies to quit smoking. Do not take medicine to help you quit smoking unless told to do so by your health care provider.  What things can I do to make it easier to quit?  Quitting smoking might feel overwhelming at first, but there is a lot that you can do to make it easier. Take these important actions:  · Reach out to your family and friends and ask that they support and encourage you during this time. Call telephone quitlines, reach out to support groups, or work with a counselor for support.  · Ask people who smoke to avoid smoking around you.  · Avoid places that trigger you to smoke, such as bars, parties, or smoke-break areas at work.  · Spend time around people who do not smoke.  · Lessen stress in your life, because stress can be a smoking trigger for some people. To lessen stress, try:  ¨ Exercising regularly.  ¨ Deep-breathing exercises.  ¨ Yoga.  ¨ Meditating.  ¨ Performing a body scan. This involves closing your eyes, scanning your body from head to toe, and noticing which parts of your body are particularly tense. Purposefully relax the muscles in those areas.  · Download or purchase mobile phone or tablet apps (applications) that can help you stick to your quit plan by providing reminders, tips, and encouragement. There are many free apps, such as QuitGuide from the CDC (Centers for Disease Control and Prevention). You can find other support for quitting smoking (smoking cessation) through smokefree.gov and other websites.  How will I feel when I quit smoking?  Within the first 24 hours of quitting smoking, you may start to feel some withdrawal symptoms. These symptoms are usually most noticeable 2-3 days after quitting, but they usually do not last beyond 2-3 weeks. Changes  or symptoms that you might experience include:  · Mood swings.  · Restlessness, anxiety, or irritation.  · Difficulty concentrating.  · Dizziness.  · Strong cravings for sugary foods in addition to nicotine.  · Mild weight gain.  · Constipation.  · Nausea.  · Coughing or a sore throat.  · Changes in how your medicines work in your body.  · A depressed mood.  · Difficulty sleeping (insomnia).  After the first 2-3 weeks of quitting, you may start to notice more positive results, such as:  · Improved sense of smell and taste.  · Decreased coughing and sore throat.  · Slower heart rate.  · Lower blood pressure.  · Clearer skin.  · The ability to breathe more easily.  · Fewer sick days.  Quitting smoking is very challenging for most people. Do not get discouraged if you are not successful the first time. Some people need to make many attempts to quit before they achieve long-term success. Do your best to stick to your quit plan, and talk with your health care provider if you have any questions or concerns.  This information is not intended to replace advice given to you by your health care provider. Make sure you discuss any questions you have with your health care provider.  Document Released: 12/12/2002 Document Revised: 08/15/2017 Document Reviewed: 05/03/2016  [employer] Interactive Patient Education © 2017 [employer]

## 2019-09-27 ENCOUNTER — HOSPITAL ENCOUNTER (OUTPATIENT)
Dept: CT IMAGING | Facility: HOSPITAL | Age: 49
Discharge: HOME | End: 2019-09-27

## 2019-09-27 ENCOUNTER — ANESTHESIA EVENT (OUTPATIENT)
Dept: GASTROENTEROLOGY | Facility: HOSPITAL | Age: 49
End: 2019-09-27

## 2019-09-27 ENCOUNTER — HOSPITAL ENCOUNTER (OUTPATIENT)
Facility: HOSPITAL | Age: 49
Setting detail: HOSPITAL OUTPATIENT SURGERY
Discharge: HOME | End: 2019-09-27
Attending: INTERNAL MEDICINE | Admitting: INTERNAL MEDICINE

## 2019-09-27 ENCOUNTER — ANESTHESIA (OUTPATIENT)
Dept: GASTROENTEROLOGY | Facility: HOSPITAL | Age: 49
End: 2019-09-27

## 2019-09-27 VITALS
TEMPERATURE: 97.6 F | OXYGEN SATURATION: 98 % | WEIGHT: 152 LBS | HEART RATE: 60 BPM | SYSTOLIC BLOOD PRESSURE: 113 MMHG | DIASTOLIC BLOOD PRESSURE: 80 MMHG | BODY MASS INDEX: 21.76 KG/M2 | RESPIRATION RATE: 16 BRPM | HEIGHT: 70 IN

## 2019-09-27 DIAGNOSIS — R13.19 ESOPHAGEAL DYSPHAGIA: ICD-10-CM

## 2019-09-27 LAB
ALBUMIN SERPL-MCNC: 4 G/DL (ref 3.5–5.2)
ALBUMIN/GLOB SERPL: 1.5 G/DL
ALP SERPL-CCNC: 84 U/L (ref 39–117)
ALT SERPL W P-5'-P-CCNC: 7 U/L (ref 1–41)
ANION GAP SERPL CALCULATED.3IONS-SCNC: 8 MMOL/L (ref 5–15)
AST SERPL-CCNC: 13 U/L (ref 1–40)
BASOPHILS # BLD AUTO: 0.05 10*3/MM3 (ref 0–0.2)
BASOPHILS NFR BLD AUTO: 0.8 % (ref 0–1.5)
BILIRUB SERPL-MCNC: 0.3 MG/DL (ref 0.2–1.2)
BUN BLD-MCNC: 7 MG/DL (ref 6–20)
BUN/CREAT SERPL: 6.5 (ref 7–25)
CALCIUM SPEC-SCNC: 9 MG/DL (ref 8.6–10.5)
CEA SERPL-MCNC: 4.4 NG/ML
CHLORIDE SERPL-SCNC: 105 MMOL/L (ref 98–107)
CO2 SERPL-SCNC: 30 MMOL/L (ref 22–29)
CREAT BLD-MCNC: 1.07 MG/DL (ref 0.76–1.27)
DEPRECATED RDW RBC AUTO: 38.9 FL (ref 37–54)
EOSINOPHIL # BLD AUTO: 0.15 10*3/MM3 (ref 0–0.4)
EOSINOPHIL NFR BLD AUTO: 2.5 % (ref 0.3–6.2)
ERYTHROCYTE [DISTWIDTH] IN BLOOD BY AUTOMATED COUNT: 11.7 % (ref 12.3–15.4)
GFR SERPL CREATININE-BSD FRML MDRD: 74 ML/MIN/1.73
GLOBULIN UR ELPH-MCNC: 2.6 GM/DL
GLUCOSE BLD-MCNC: 96 MG/DL (ref 65–99)
HCT VFR BLD AUTO: 44.9 % (ref 37.5–51)
HGB BLD-MCNC: 15.2 G/DL (ref 13–17.7)
IMM GRANULOCYTES # BLD AUTO: 0.01 10*3/MM3 (ref 0–0.05)
IMM GRANULOCYTES NFR BLD AUTO: 0.2 % (ref 0–0.5)
LYMPHOCYTES # BLD AUTO: 1.66 10*3/MM3 (ref 0.7–3.1)
LYMPHOCYTES NFR BLD AUTO: 27.2 % (ref 19.6–45.3)
MCH RBC QN AUTO: 30.8 PG (ref 26.6–33)
MCHC RBC AUTO-ENTMCNC: 33.9 G/DL (ref 31.5–35.7)
MCV RBC AUTO: 91.1 FL (ref 79–97)
MONOCYTES # BLD AUTO: 0.38 10*3/MM3 (ref 0.1–0.9)
MONOCYTES NFR BLD AUTO: 6.2 % (ref 5–12)
NEUTROPHILS # BLD AUTO: 3.86 10*3/MM3 (ref 1.7–7)
NEUTROPHILS NFR BLD AUTO: 63.1 % (ref 42.7–76)
NRBC BLD AUTO-RTO: 0 /100 WBC (ref 0–0.2)
PLATELET # BLD AUTO: 217 10*3/MM3 (ref 140–450)
PMV BLD AUTO: 10.2 FL (ref 6–12)
POTASSIUM BLD-SCNC: 5 MMOL/L (ref 3.5–5.2)
PROT SERPL-MCNC: 6.6 G/DL (ref 6–8.5)
RBC # BLD AUTO: 4.93 10*6/MM3 (ref 4.14–5.8)
SODIUM BLD-SCNC: 143 MMOL/L (ref 136–145)
WBC NRBC COR # BLD: 6.11 10*3/MM3 (ref 3.4–10.8)

## 2019-09-27 PROCEDURE — 82378 CARCINOEMBRYONIC ANTIGEN: CPT | Performed by: INTERNAL MEDICINE

## 2019-09-27 PROCEDURE — 43202 ESOPHAGOSCOPY FLEX BIOPSY: CPT | Performed by: INTERNAL MEDICINE

## 2019-09-27 PROCEDURE — 25010000002 PROPOFOL 10 MG/ML EMULSION: Performed by: NURSE ANESTHETIST, CERTIFIED REGISTERED

## 2019-09-27 PROCEDURE — 88305 TISSUE EXAM BY PATHOLOGIST: CPT | Performed by: INTERNAL MEDICINE

## 2019-09-27 PROCEDURE — 25010000002 IOPAMIDOL 61 % SOLUTION: Performed by: INTERNAL MEDICINE

## 2019-09-27 PROCEDURE — 36415 COLL VENOUS BLD VENIPUNCTURE: CPT | Performed by: INTERNAL MEDICINE

## 2019-09-27 PROCEDURE — 71260 CT THORAX DX C+: CPT

## 2019-09-27 PROCEDURE — 74177 CT ABD & PELVIS W/CONTRAST: CPT

## 2019-09-27 PROCEDURE — 80053 COMPREHEN METABOLIC PANEL: CPT | Performed by: INTERNAL MEDICINE

## 2019-09-27 PROCEDURE — 85025 COMPLETE CBC W/AUTO DIFF WBC: CPT | Performed by: INTERNAL MEDICINE

## 2019-09-27 RX ORDER — PROPOFOL 10 MG/ML
VIAL (ML) INTRAVENOUS AS NEEDED
Status: DISCONTINUED | OUTPATIENT
Start: 2019-09-27 | End: 2019-09-27 | Stop reason: SURG

## 2019-09-27 RX ORDER — SODIUM CHLORIDE 0.9 % (FLUSH) 0.9 %
10 SYRINGE (ML) INJECTION AS NEEDED
Status: DISCONTINUED | OUTPATIENT
Start: 2019-09-27 | End: 2019-09-27 | Stop reason: HOSPADM

## 2019-09-27 RX ORDER — ASPIRIN 81 MG/1
81 TABLET, CHEWABLE ORAL DAILY
COMMUNITY
End: 2019-10-23

## 2019-09-27 RX ORDER — SODIUM CHLORIDE 9 MG/ML
500 INJECTION, SOLUTION INTRAVENOUS CONTINUOUS PRN
Status: DISCONTINUED | OUTPATIENT
Start: 2019-09-27 | End: 2019-09-30 | Stop reason: HOSPADM

## 2019-09-27 RX ADMIN — PROPOFOL 100 MG: 10 INJECTION, EMULSION INTRAVENOUS at 10:30

## 2019-09-27 RX ADMIN — IOPAMIDOL 101 ML: 612 INJECTION, SOLUTION INTRAVENOUS at 14:44

## 2019-09-27 RX ADMIN — SODIUM CHLORIDE: 0.9 INJECTION, SOLUTION INTRAVENOUS at 10:25

## 2019-09-27 RX ADMIN — LIDOCAINE HYDROCHLORIDE 100 MG: 20 INJECTION, SOLUTION INTRAVENOUS at 10:30

## 2019-09-27 NOTE — ANESTHESIA PREPROCEDURE EVALUATION
Anesthesia Evaluation     Patient summary reviewed   no history of anesthetic complications (pt has ptsd and has had agitation on emergence):  NPO Solid Status: > 8 hours             Airway   Mallampati: II  TM distance: >3 FB  Neck ROM: full  Dental    (+) upper dentures and lower dentures    Pulmonary    (+) a smoker,   Cardiovascular - negative cardio ROS  Exercise tolerance: excellent (>7 METS)        Neuro/Psych- negative ROS  GI/Hepatic/Renal/Endo - negative ROS     Musculoskeletal     Abdominal    Substance History      OB/GYN          Other                      Anesthesia Plan    ASA 2     MAC     Anesthetic plan, all risks, benefits, and alternatives have been provided, discussed and informed consent has been obtained with: patient.

## 2019-09-27 NOTE — ANESTHESIA POSTPROCEDURE EVALUATION
Patient: Tyler Fallon    Procedure Summary     Date:  09/27/19 Room / Location:  [facility] ENDOSCOPY 5 / [facility]    Anesthesia Start:  1025 Anesthesia Stop:  1040    Procedure:  ESOPHAGOGASTRODUODENOSCOPY WITH ANESTHESIA (N/A ) Diagnosis:       Esophageal dysphagia      (Esophageal dysphagia [R13.10])    Surgeon:  Nikolai Pearce MD Provider:  Alex Peterson CRNA    Anesthesia Type:  MAC ASA Status:  2          Anesthesia Type: MAC  Last vitals  BP   114/86 (09/27/19 0843)   Temp   97.6 °F (36.4 °C) (09/27/19 0843)   Pulse   85 (09/27/19 0843)   Resp   20 (09/27/19 0843)     SpO2   96 % (09/27/19 0843)     Post Anesthesia Care and Evaluation    Patient location during evaluation: PHASE II  Patient participation: complete - patient participated  Level of consciousness: awake and alert  Pain management: adequate  Airway patency: patent  Anesthetic complications: No anesthetic complications  PONV Status: none  Cardiovascular status: acceptable and stable  Respiratory status: acceptable and unassisted  Hydration status: acceptable

## 2019-09-30 ENCOUNTER — TELEPHONE (OUTPATIENT)
Dept: GASTROENTEROLOGY | Facility: CLINIC | Age: 49
End: 2019-09-30

## 2019-09-30 NOTE — TELEPHONE ENCOUNTER
Discussed with Dr. William Menard.  Please fax all information this morning to his office and he will arrange for an appointment in the next 1 to 2 days.  Please call patient let him know.  Also please call pathology to finalize his pathology report.

## 2019-10-01 ENCOUNTER — ANESTHESIA EVENT (OUTPATIENT)
Dept: ENDOSCOPY | Age: 49
End: 2019-10-01

## 2019-10-02 ENCOUNTER — ANESTHESIA (OUTPATIENT)
Dept: ENDOSCOPY | Age: 49
End: 2019-10-02

## 2019-10-02 ENCOUNTER — HOSPITAL ENCOUNTER (OUTPATIENT)
Age: 49
Setting detail: OUTPATIENT SURGERY
Discharge: HOME | End: 2019-10-02
Attending: INTERNAL MEDICINE | Admitting: INTERNAL MEDICINE
Payer: COMMERCIAL

## 2019-10-02 ENCOUNTER — APPOINTMENT (OUTPATIENT)
Dept: GENERAL RADIOLOGY | Age: 49
End: 2019-10-02
Attending: INTERNAL MEDICINE

## 2019-10-02 VITALS
RESPIRATION RATE: 18 BRPM | HEIGHT: 70 IN | TEMPERATURE: 97.8 F | BODY MASS INDEX: 21.47 KG/M2 | HEART RATE: 61 BPM | OXYGEN SATURATION: 96 % | DIASTOLIC BLOOD PRESSURE: 62 MMHG | SYSTOLIC BLOOD PRESSURE: 103 MMHG | WEIGHT: 150 LBS

## 2019-10-02 VITALS
OXYGEN SATURATION: 97 % | SYSTOLIC BLOOD PRESSURE: 95 MMHG | DIASTOLIC BLOOD PRESSURE: 57 MMHG | RESPIRATION RATE: 18 BRPM

## 2019-10-02 DIAGNOSIS — G89.3 CANCER ASSOCIATED PAIN: Primary | ICD-10-CM

## 2019-10-02 PROCEDURE — 2580000003 HC RX 258: Performed by: INTERNAL MEDICINE

## 2019-10-02 PROCEDURE — 3700000001 HC ADD 15 MINUTES (ANESTHESIA): Performed by: INTERNAL MEDICINE

## 2019-10-02 PROCEDURE — 2709999900 HC NON-CHARGEABLE SUPPLY: Performed by: INTERNAL MEDICINE

## 2019-10-02 PROCEDURE — 3700000000 HC ANESTHESIA ATTENDED CARE: Performed by: INTERNAL MEDICINE

## 2019-10-02 PROCEDURE — 6360000002 HC RX W HCPCS: Performed by: NURSE ANESTHETIST, CERTIFIED REGISTERED

## 2019-10-02 PROCEDURE — 71045 X-RAY EXAM CHEST 1 VIEW: CPT

## 2019-10-02 PROCEDURE — C1769 GUIDE WIRE: HCPCS | Performed by: INTERNAL MEDICINE

## 2019-10-02 PROCEDURE — 2500000003 HC RX 250 WO HCPCS: Performed by: NURSE ANESTHETIST, CERTIFIED REGISTERED

## 2019-10-02 PROCEDURE — 3209999900 FLUORO FOR SURGICAL PROCEDURES

## 2019-10-02 PROCEDURE — 7100000000 HC PACU RECOVERY - FIRST 15 MIN: Performed by: INTERNAL MEDICINE

## 2019-10-02 PROCEDURE — 7100000001 HC PACU RECOVERY - ADDTL 15 MIN: Performed by: INTERNAL MEDICINE

## 2019-10-02 PROCEDURE — 6360000002 HC RX W HCPCS: Performed by: ANESTHESIOLOGY

## 2019-10-02 PROCEDURE — C1874 STENT, COATED/COV W/DEL SYS: HCPCS | Performed by: INTERNAL MEDICINE

## 2019-10-02 PROCEDURE — 2720000010 HC SURG SUPPLY STERILE: Performed by: INTERNAL MEDICINE

## 2019-10-02 PROCEDURE — 3609017100 HC EGD: Performed by: INTERNAL MEDICINE

## 2019-10-02 PROCEDURE — 6370000000 HC RX 637 (ALT 250 FOR IP): Performed by: INTERNAL MEDICINE

## 2019-10-02 PROCEDURE — 43266 EGD ENDOSCOPIC STENT PLACE: CPT | Performed by: INTERNAL MEDICINE

## 2019-10-02 PROCEDURE — 7100000010 HC PHASE II RECOVERY - FIRST 15 MIN: Performed by: INTERNAL MEDICINE

## 2019-10-02 DEVICE — STENT SYSTEM
Type: IMPLANTABLE DEVICE | Site: ESOPHAGUS | Status: FUNCTIONAL
Brand: WALLFLEX™ ESOPHAGEAL

## 2019-10-02 RX ORDER — ONDANSETRON 2 MG/ML
INJECTION INTRAMUSCULAR; INTRAVENOUS PRN
Status: DISCONTINUED | OUTPATIENT
Start: 2019-10-02 | End: 2019-10-03 | Stop reason: SDUPTHER

## 2019-10-02 RX ORDER — FENTANYL CITRATE 50 UG/ML
50 INJECTION, SOLUTION INTRAMUSCULAR; INTRAVENOUS
Status: DISCONTINUED | OUTPATIENT
Start: 2019-10-02 | End: 2019-10-02 | Stop reason: HOSPADM

## 2019-10-02 RX ORDER — LIDOCAINE HYDROCHLORIDE 10 MG/ML
INJECTION, SOLUTION INFILTRATION; PERINEURAL PRN
Status: DISCONTINUED | OUTPATIENT
Start: 2019-10-02 | End: 2019-10-03 | Stop reason: SDUPTHER

## 2019-10-02 RX ORDER — OXYCODONE AND ACETAMINOPHEN 7.5; 325 MG/1; MG/1
1 TABLET ORAL EVERY 4 HOURS PRN
Qty: 18 TABLET | Refills: 0 | Status: SHIPPED | OUTPATIENT
Start: 2019-10-02 | End: 2019-10-02 | Stop reason: SDUPTHER

## 2019-10-02 RX ORDER — ROCURONIUM BROMIDE 10 MG/ML
INJECTION, SOLUTION INTRAVENOUS PRN
Status: DISCONTINUED | OUTPATIENT
Start: 2019-10-02 | End: 2019-10-03 | Stop reason: SDUPTHER

## 2019-10-02 RX ORDER — PANTOPRAZOLE SODIUM 40 MG/1
40 TABLET, DELAYED RELEASE ORAL
Qty: 60 TABLET | Refills: 11 | Status: ON HOLD | OUTPATIENT
Start: 2019-10-02 | End: 2019-10-21

## 2019-10-02 RX ORDER — OXYCODONE AND ACETAMINOPHEN 7.5; 325 MG/1; MG/1
1 TABLET ORAL EVERY 4 HOURS PRN
Qty: 18 TABLET | Refills: 0 | Status: SHIPPED | OUTPATIENT
Start: 2019-10-02 | End: 2019-10-05

## 2019-10-02 RX ORDER — LIDOCAINE HYDROCHLORIDE 10 MG/ML
1 INJECTION, SOLUTION EPIDURAL; INFILTRATION; INTRACAUDAL; PERINEURAL
Status: DISCONTINUED | OUTPATIENT
Start: 2019-10-02 | End: 2019-10-02 | Stop reason: HOSPADM

## 2019-10-02 RX ORDER — SODIUM CHLORIDE, SODIUM LACTATE, POTASSIUM CHLORIDE, CALCIUM CHLORIDE 600; 310; 30; 20 MG/100ML; MG/100ML; MG/100ML; MG/100ML
INJECTION, SOLUTION INTRAVENOUS CONTINUOUS
Status: DISCONTINUED | OUTPATIENT
Start: 2019-10-02 | End: 2019-10-02 | Stop reason: HOSPADM

## 2019-10-02 RX ORDER — LABETALOL 20 MG/4 ML (5 MG/ML) INTRAVENOUS SYRINGE
5 EVERY 10 MIN PRN
Status: DISCONTINUED | OUTPATIENT
Start: 2019-10-02 | End: 2019-10-02 | Stop reason: HOSPADM

## 2019-10-02 RX ORDER — HYDRALAZINE HYDROCHLORIDE 20 MG/ML
5 INJECTION INTRAMUSCULAR; INTRAVENOUS EVERY 10 MIN PRN
Status: DISCONTINUED | OUTPATIENT
Start: 2019-10-02 | End: 2019-10-02 | Stop reason: HOSPADM

## 2019-10-02 RX ORDER — SODIUM CHLORIDE 9 MG/ML
INJECTION, SOLUTION INTRAVENOUS CONTINUOUS
Status: DISCONTINUED | OUTPATIENT
Start: 2019-10-02 | End: 2019-10-02 | Stop reason: HOSPADM

## 2019-10-02 RX ORDER — DEXAMETHASONE SODIUM PHOSPHATE 10 MG/ML
INJECTION INTRAMUSCULAR; INTRAVENOUS PRN
Status: DISCONTINUED | OUTPATIENT
Start: 2019-10-02 | End: 2019-10-03 | Stop reason: SDUPTHER

## 2019-10-02 RX ORDER — PROMETHAZINE HYDROCHLORIDE 25 MG/ML
6.25 INJECTION, SOLUTION INTRAMUSCULAR; INTRAVENOUS
Status: DISCONTINUED | OUTPATIENT
Start: 2019-10-02 | End: 2019-10-02 | Stop reason: HOSPADM

## 2019-10-02 RX ORDER — FENTANYL CITRATE 50 UG/ML
25 INJECTION, SOLUTION INTRAMUSCULAR; INTRAVENOUS
Status: DISCONTINUED | OUTPATIENT
Start: 2019-10-02 | End: 2019-10-02 | Stop reason: HOSPADM

## 2019-10-02 RX ORDER — OXYCODONE AND ACETAMINOPHEN 7.5; 325 MG/1; MG/1
1 TABLET ORAL EVERY 4 HOURS PRN
Status: DISCONTINUED | OUTPATIENT
Start: 2019-10-02 | End: 2019-10-02 | Stop reason: HOSPADM

## 2019-10-02 RX ORDER — ASPIRIN 81 MG/1
81 TABLET, CHEWABLE ORAL
COMMUNITY
End: 2019-11-11

## 2019-10-02 RX ORDER — SODIUM CHLORIDE 0.9 % (FLUSH) 0.9 %
10 SYRINGE (ML) INJECTION PRN
Status: DISCONTINUED | OUTPATIENT
Start: 2019-10-02 | End: 2019-10-02 | Stop reason: HOSPADM

## 2019-10-02 RX ORDER — MORPHINE SULFATE 4 MG/ML
4 INJECTION, SOLUTION INTRAMUSCULAR; INTRAVENOUS EVERY 5 MIN PRN
Status: DISCONTINUED | OUTPATIENT
Start: 2019-10-02 | End: 2019-10-02 | Stop reason: HOSPADM

## 2019-10-02 RX ORDER — ENALAPRILAT 2.5 MG/2ML
1.25 INJECTION INTRAVENOUS
Status: DISCONTINUED | OUTPATIENT
Start: 2019-10-02 | End: 2019-10-02 | Stop reason: HOSPADM

## 2019-10-02 RX ORDER — PROPOFOL 10 MG/ML
INJECTION, EMULSION INTRAVENOUS PRN
Status: DISCONTINUED | OUTPATIENT
Start: 2019-10-02 | End: 2019-10-03 | Stop reason: SDUPTHER

## 2019-10-02 RX ORDER — MIDAZOLAM HYDROCHLORIDE 1 MG/ML
2 INJECTION INTRAMUSCULAR; INTRAVENOUS
Status: DISCONTINUED | OUTPATIENT
Start: 2019-10-02 | End: 2019-10-02 | Stop reason: HOSPADM

## 2019-10-02 RX ORDER — SUCRALFATE ORAL 1 G/10ML
1 SUSPENSION ORAL 4 TIMES DAILY
Qty: 1200 ML | Refills: 3 | Status: ON HOLD | OUTPATIENT
Start: 2019-10-02 | End: 2019-10-21

## 2019-10-02 RX ORDER — DIPHENHYDRAMINE HYDROCHLORIDE 50 MG/ML
12.5 INJECTION INTRAMUSCULAR; INTRAVENOUS
Status: DISCONTINUED | OUTPATIENT
Start: 2019-10-02 | End: 2019-10-02 | Stop reason: HOSPADM

## 2019-10-02 RX ORDER — MORPHINE SULFATE 4 MG/ML
2 INJECTION, SOLUTION INTRAMUSCULAR; INTRAVENOUS EVERY 5 MIN PRN
Status: DISCONTINUED | OUTPATIENT
Start: 2019-10-02 | End: 2019-10-02 | Stop reason: HOSPADM

## 2019-10-02 RX ORDER — MEPERIDINE HYDROCHLORIDE 50 MG/ML
12.5 INJECTION INTRAMUSCULAR; INTRAVENOUS; SUBCUTANEOUS EVERY 5 MIN PRN
Status: DISCONTINUED | OUTPATIENT
Start: 2019-10-02 | End: 2019-10-02 | Stop reason: HOSPADM

## 2019-10-02 RX ORDER — SODIUM CHLORIDE 0.9 % (FLUSH) 0.9 %
10 SYRINGE (ML) INJECTION EVERY 12 HOURS SCHEDULED
Status: DISCONTINUED | OUTPATIENT
Start: 2019-10-02 | End: 2019-10-02 | Stop reason: HOSPADM

## 2019-10-02 RX ORDER — METOCLOPRAMIDE HYDROCHLORIDE 5 MG/ML
10 INJECTION INTRAMUSCULAR; INTRAVENOUS
Status: DISCONTINUED | OUTPATIENT
Start: 2019-10-02 | End: 2019-10-02 | Stop reason: HOSPADM

## 2019-10-02 RX ADMIN — MORPHINE SULFATE 4 MG: 4 INJECTION INTRAVENOUS at 17:22

## 2019-10-02 RX ADMIN — ONDANSETRON HYDROCHLORIDE 4 MG: 2 INJECTION, SOLUTION INTRAMUSCULAR; INTRAVENOUS at 15:08

## 2019-10-02 RX ADMIN — LIDOCAINE HYDROCHLORIDE: 20 SOLUTION ORAL; TOPICAL at 17:20

## 2019-10-02 RX ADMIN — FENTANYL CITRATE 75 MCG: 50 INJECTION INTRAMUSCULAR; INTRAVENOUS at 15:02

## 2019-10-02 RX ADMIN — SODIUM CHLORIDE, SODIUM LACTATE, POTASSIUM CHLORIDE, AND CALCIUM CHLORIDE: 600; 310; 30; 20 INJECTION, SOLUTION INTRAVENOUS at 16:21

## 2019-10-02 RX ADMIN — DEXAMETHASONE SODIUM PHOSPHATE 10 MG: 10 INJECTION INTRAMUSCULAR; INTRAVENOUS at 15:08

## 2019-10-02 RX ADMIN — SUGAMMADEX 150 MG: 100 INJECTION, SOLUTION INTRAVENOUS at 16:04

## 2019-10-02 RX ADMIN — SODIUM CHLORIDE, SODIUM LACTATE, POTASSIUM CHLORIDE, AND CALCIUM CHLORIDE: 600; 310; 30; 20 INJECTION, SOLUTION INTRAVENOUS at 14:00

## 2019-10-02 RX ADMIN — FENTANYL CITRATE 50 MCG: 50 INJECTION INTRAMUSCULAR; INTRAVENOUS at 16:20

## 2019-10-02 RX ADMIN — MIDAZOLAM 2 MG: 1 INJECTION INTRAMUSCULAR; INTRAVENOUS at 14:54

## 2019-10-02 RX ADMIN — SODIUM CHLORIDE, SODIUM LACTATE, POTASSIUM CHLORIDE, AND CALCIUM CHLORIDE: 600; 310; 30; 20 INJECTION, SOLUTION INTRAVENOUS at 14:57

## 2019-10-02 RX ADMIN — FENTANYL CITRATE 25 MCG: 50 INJECTION INTRAMUSCULAR; INTRAVENOUS at 16:11

## 2019-10-02 RX ADMIN — PROPOFOL 150 MG: 10 INJECTION, EMULSION INTRAVENOUS at 15:02

## 2019-10-02 RX ADMIN — LIDOCAINE HYDROCHLORIDE 50 MG: 10 INJECTION, SOLUTION INFILTRATION; PERINEURAL at 15:02

## 2019-10-02 RX ADMIN — ROCURONIUM BROMIDE 50 MG: 10 SOLUTION INTRAVENOUS at 15:02

## 2019-10-02 ASSESSMENT — PAIN DESCRIPTION - LOCATION
LOCATION: THROAT
LOCATION: THROAT

## 2019-10-02 ASSESSMENT — PAIN SCALES - GENERAL
PAINLEVEL_OUTOF10: 0
PAINLEVEL_OUTOF10: 7
PAINLEVEL_OUTOF10: 7
PAINLEVEL_OUTOF10: 4

## 2019-10-02 ASSESSMENT — PAIN DESCRIPTION - DESCRIPTORS
DESCRIPTORS: ACHING
DESCRIPTORS: ACHING

## 2019-10-02 ASSESSMENT — LIFESTYLE VARIABLES: SMOKING_STATUS: 1

## 2019-10-02 ASSESSMENT — ENCOUNTER SYMPTOMS: SHORTNESS OF BREATH: 0

## 2019-10-02 ASSESSMENT — PAIN DESCRIPTION - PAIN TYPE
TYPE: SURGICAL PAIN
TYPE: SURGICAL PAIN

## 2019-10-02 ASSESSMENT — PAIN - FUNCTIONAL ASSESSMENT: PAIN_FUNCTIONAL_ASSESSMENT: 0-10

## 2019-10-03 ENCOUNTER — TELEPHONE (OUTPATIENT)
Dept: GASTROENTEROLOGY | Age: 49
End: 2019-10-03

## 2019-10-03 DIAGNOSIS — K22.89 ESOPHAGEAL MASS: ICD-10-CM

## 2019-10-03 DIAGNOSIS — G89.3 CANCER ASSOCIATED PAIN: Primary | ICD-10-CM

## 2019-10-03 RX ORDER — MIDAZOLAM HYDROCHLORIDE 1 MG/ML
INJECTION INTRAMUSCULAR; INTRAVENOUS PRN
Status: DISCONTINUED | OUTPATIENT
Start: 2019-10-02 | End: 2019-10-03 | Stop reason: SDUPTHER

## 2019-10-04 ENCOUNTER — HOSPITAL ENCOUNTER (OUTPATIENT)
Dept: GENERAL RADIOLOGY | Age: 49
Discharge: HOME | End: 2019-10-04
Payer: COMMERCIAL

## 2019-10-04 DIAGNOSIS — K22.89 ESOPHAGEAL MASS: ICD-10-CM

## 2019-10-04 DIAGNOSIS — G89.3 CANCER ASSOCIATED PAIN: ICD-10-CM

## 2019-10-04 PROCEDURE — 71046 X-RAY EXAM CHEST 2 VIEWS: CPT

## 2019-10-08 PROBLEM — C15.9 ESOPHAGEAL ADENOCARCINOMA (HCC): Status: ACTIVE | Noted: 2019-10-08

## 2019-10-09 ENCOUNTER — HOSPITAL ENCOUNTER (OUTPATIENT)
Dept: INFUSION THERAPY | Age: 49
Discharge: HOME | End: 2019-10-09

## 2019-10-09 ENCOUNTER — OFFICE VISIT (OUTPATIENT)
Dept: HEMATOLOGY | Age: 49
End: 2019-10-09
Payer: COMMERCIAL

## 2019-10-09 VITALS
HEART RATE: 98 BPM | BODY MASS INDEX: 20.97 KG/M2 | OXYGEN SATURATION: 97 % | SYSTOLIC BLOOD PRESSURE: 116 MMHG | WEIGHT: 146.5 LBS | DIASTOLIC BLOOD PRESSURE: 76 MMHG | HEIGHT: 70 IN

## 2019-10-09 DIAGNOSIS — C15.9 ESOPHAGEAL ADENOCARCINOMA (HCC): ICD-10-CM

## 2019-10-09 PROCEDURE — 36415 COLL VENOUS BLD VENIPUNCTURE: CPT

## 2019-10-09 PROCEDURE — 85025 COMPLETE CBC W/AUTO DIFF WBC: CPT

## 2019-10-09 PROCEDURE — 86301 IMMUNOASSAY TUMOR CA 19-9: CPT

## 2019-10-09 PROCEDURE — 80053 COMPREHEN METABOLIC PANEL: CPT

## 2019-10-09 PROCEDURE — 99205 OFFICE O/P NEW HI 60 MIN: CPT | Performed by: INTERNAL MEDICINE

## 2019-10-09 PROCEDURE — 82378 CARCINOEMBRYONIC ANTIGEN: CPT

## 2019-10-14 ENCOUNTER — HOSPITAL ENCOUNTER (OUTPATIENT)
Dept: NUCLEAR MEDICINE | Age: 49
Discharge: HOME | End: 2019-10-16
Payer: COMMERCIAL

## 2019-10-14 DIAGNOSIS — C15.9 ESOPHAGEAL ADENOCARCINOMA (HCC): ICD-10-CM

## 2019-10-14 LAB
GLUCOSE BLD-MCNC: 97 MG/DL (ref 70–99)
PERFORMED ON: NORMAL

## 2019-10-14 PROCEDURE — 3430000000 HC RX DIAGNOSTIC RADIOPHARMACEUTICAL: Performed by: INTERNAL MEDICINE

## 2019-10-14 PROCEDURE — A9552 F18 FDG: HCPCS | Performed by: INTERNAL MEDICINE

## 2019-10-14 PROCEDURE — 78815 PET IMAGE W/CT SKULL-THIGH: CPT

## 2019-10-14 PROCEDURE — 82948 REAGENT STRIP/BLOOD GLUCOSE: CPT

## 2019-10-14 RX ORDER — FLUDEOXYGLUCOSE F 18 200 MCI/ML
10 INJECTION, SOLUTION INTRAVENOUS
Status: COMPLETED | OUTPATIENT
Start: 2019-10-14 | End: 2019-10-14

## 2019-10-14 RX ADMIN — FLUDEOXYGLUCOSE F 18 10 MILLICURIE: 200 INJECTION, SOLUTION INTRAVENOUS at 12:21

## 2019-10-16 ENCOUNTER — HOSPITAL ENCOUNTER (OUTPATIENT)
Age: 49
Setting detail: OUTPATIENT SURGERY
Discharge: HOME | End: 2019-10-16
Attending: INTERNAL MEDICINE | Admitting: INTERNAL MEDICINE
Payer: COMMERCIAL

## 2019-10-16 ENCOUNTER — ANESTHESIA EVENT (OUTPATIENT)
Dept: ENDOSCOPY | Age: 49
End: 2019-10-16
Payer: COMMERCIAL

## 2019-10-16 ENCOUNTER — ANESTHESIA (OUTPATIENT)
Dept: ENDOSCOPY | Age: 49
End: 2019-10-16
Payer: COMMERCIAL

## 2019-10-16 VITALS
SYSTOLIC BLOOD PRESSURE: 130 MMHG | OXYGEN SATURATION: 100 % | DIASTOLIC BLOOD PRESSURE: 80 MMHG | HEIGHT: 70 IN | RESPIRATION RATE: 16 BRPM | WEIGHT: 150 LBS | HEART RATE: 80 BPM | BODY MASS INDEX: 21.47 KG/M2 | TEMPERATURE: 97.9 F

## 2019-10-16 PROBLEM — C15.9 ESOPHAGEAL CANCER (HCC): Status: ACTIVE | Noted: 2019-10-16

## 2019-10-16 PROCEDURE — 2580000003 HC RX 258: Performed by: INTERNAL MEDICINE

## 2019-10-16 RX ORDER — SODIUM CHLORIDE, SODIUM LACTATE, POTASSIUM CHLORIDE, CALCIUM CHLORIDE 600; 310; 30; 20 MG/100ML; MG/100ML; MG/100ML; MG/100ML
INJECTION, SOLUTION INTRAVENOUS CONTINUOUS
Status: DISCONTINUED | OUTPATIENT
Start: 2019-10-16 | End: 2019-10-17 | Stop reason: HOSPADM

## 2019-10-16 RX ORDER — OXYCODONE HYDROCHLORIDE AND ACETAMINOPHEN 5; 325 MG/1; MG/1
1 TABLET ORAL EVERY 4 HOURS PRN
COMMUNITY
End: 2019-12-09 | Stop reason: CLARIF

## 2019-10-16 RX ADMIN — SODIUM CHLORIDE, POTASSIUM CHLORIDE, SODIUM LACTATE AND CALCIUM CHLORIDE: 600; 310; 30; 20 INJECTION, SOLUTION INTRAVENOUS at 11:59

## 2019-10-16 SDOH — HEALTH STABILITY: MENTAL HEALTH: HOW OFTEN DO YOU HAVE A DRINK CONTAINING ALCOHOL?: NEVER

## 2019-10-16 ASSESSMENT — PAIN - FUNCTIONAL ASSESSMENT: PAIN_FUNCTIONAL_ASSESSMENT: 0-10

## 2019-10-16 ASSESSMENT — LIFESTYLE VARIABLES: SMOKING_STATUS: 1

## 2019-10-16 ASSESSMENT — ENCOUNTER SYMPTOMS: SHORTNESS OF BREATH: 0

## 2019-10-18 ENCOUNTER — TELEPHONE (OUTPATIENT)
Dept: GASTROENTEROLOGY | Age: 49
End: 2019-10-18

## 2019-10-21 ENCOUNTER — ANESTHESIA EVENT (OUTPATIENT)
Dept: ENDOSCOPY | Age: 49
End: 2019-10-21

## 2019-10-21 ENCOUNTER — ANESTHESIA (OUTPATIENT)
Dept: ENDOSCOPY | Age: 49
End: 2019-10-21

## 2019-10-21 ENCOUNTER — APPOINTMENT (OUTPATIENT)
Dept: GENERAL RADIOLOGY | Age: 49
End: 2019-10-21
Attending: INTERNAL MEDICINE

## 2019-10-21 ENCOUNTER — HOSPITAL ENCOUNTER (OUTPATIENT)
Age: 49
Setting detail: OUTPATIENT SURGERY
Discharge: HOME | End: 2019-10-21
Attending: INTERNAL MEDICINE | Admitting: INTERNAL MEDICINE
Payer: OTHER GOVERNMENT

## 2019-10-21 VITALS
TEMPERATURE: 97 F | RESPIRATION RATE: 10 BRPM | DIASTOLIC BLOOD PRESSURE: 61 MMHG | OXYGEN SATURATION: 97 % | SYSTOLIC BLOOD PRESSURE: 89 MMHG

## 2019-10-21 VITALS
HEIGHT: 70 IN | RESPIRATION RATE: 17 BRPM | WEIGHT: 150 LBS | OXYGEN SATURATION: 97 % | DIASTOLIC BLOOD PRESSURE: 71 MMHG | TEMPERATURE: 98.1 F | HEART RATE: 80 BPM | BODY MASS INDEX: 21.47 KG/M2 | SYSTOLIC BLOOD PRESSURE: 128 MMHG

## 2019-10-21 DIAGNOSIS — G89.3 CANCER ASSOCIATED PAIN: Primary | ICD-10-CM

## 2019-10-21 DIAGNOSIS — K22.89 ESOPHAGEAL MASS: ICD-10-CM

## 2019-10-21 DIAGNOSIS — C15.9 ESOPHAGEAL ADENOCARCINOMA (HCC): ICD-10-CM

## 2019-10-21 PROBLEM — Z72.0 CURRENT TOBACCO USE: Status: ACTIVE | Noted: 2019-10-21

## 2019-10-21 PROBLEM — Z71.9 ENCOUNTER FOR CONSULTATION: Status: ACTIVE | Noted: 2019-10-21

## 2019-10-21 PROCEDURE — 3700000001 HC ADD 15 MINUTES (ANESTHESIA): Performed by: INTERNAL MEDICINE

## 2019-10-21 PROCEDURE — 3209999900 FLUORO FOR SURGICAL PROCEDURES

## 2019-10-21 PROCEDURE — 3609013700 HC EGD STENT PLACEMENT: Performed by: INTERNAL MEDICINE

## 2019-10-21 PROCEDURE — 71045 X-RAY EXAM CHEST 1 VIEW: CPT

## 2019-10-21 PROCEDURE — 2709999900 HC NON-CHARGEABLE SUPPLY: Performed by: INTERNAL MEDICINE

## 2019-10-21 PROCEDURE — 6360000002 HC RX W HCPCS: Performed by: NURSE ANESTHETIST, CERTIFIED REGISTERED

## 2019-10-21 PROCEDURE — 7100000000 HC PACU RECOVERY - FIRST 15 MIN: Performed by: INTERNAL MEDICINE

## 2019-10-21 PROCEDURE — 7100000001 HC PACU RECOVERY - ADDTL 15 MIN: Performed by: INTERNAL MEDICINE

## 2019-10-21 PROCEDURE — 7100000010 HC PHASE II RECOVERY - FIRST 15 MIN: Performed by: INTERNAL MEDICINE

## 2019-10-21 PROCEDURE — C1726 CATH, BAL DIL, NON-VASCULAR: HCPCS | Performed by: INTERNAL MEDICINE

## 2019-10-21 PROCEDURE — 2580000003 HC RX 258: Performed by: INTERNAL MEDICINE

## 2019-10-21 PROCEDURE — 2720000010 HC SURG SUPPLY STERILE: Performed by: INTERNAL MEDICINE

## 2019-10-21 PROCEDURE — 6370000000 HC RX 637 (ALT 250 FOR IP): Performed by: INTERNAL MEDICINE

## 2019-10-21 PROCEDURE — 43266 EGD ENDOSCOPIC STENT PLACE: CPT | Performed by: INTERNAL MEDICINE

## 2019-10-21 PROCEDURE — C1874 STENT, COATED/COV W/DEL SYS: HCPCS | Performed by: INTERNAL MEDICINE

## 2019-10-21 PROCEDURE — C1769 GUIDE WIRE: HCPCS | Performed by: INTERNAL MEDICINE

## 2019-10-21 PROCEDURE — 3609017700 HC EGD DILATION GASTRIC/DUODENAL STRICTURE: Performed by: INTERNAL MEDICINE

## 2019-10-21 PROCEDURE — 2500000003 HC RX 250 WO HCPCS: Performed by: NURSE ANESTHETIST, CERTIFIED REGISTERED

## 2019-10-21 PROCEDURE — 3700000000 HC ANESTHESIA ATTENDED CARE: Performed by: INTERNAL MEDICINE

## 2019-10-21 DEVICE — STENT SYSTEM
Type: IMPLANTABLE DEVICE | Site: ESOPHAGUS | Status: FUNCTIONAL
Brand: WALLFLEX™ ESOPHAGEAL

## 2019-10-21 RX ORDER — SUCRALFATE 1 G/1
1 TABLET ORAL
Qty: 120 TABLET | Refills: 3 | Status: SHIPPED | OUTPATIENT
Start: 2019-10-21 | End: 2019-10-21 | Stop reason: SDUPTHER

## 2019-10-21 RX ORDER — OXYCODONE HYDROCHLORIDE AND ACETAMINOPHEN 325; 5 MG/5ML; MG/5ML
7.5 SOLUTION ORAL EVERY 6 HOURS
Qty: 90 ML | Refills: 0 | Status: SHIPPED | OUTPATIENT
Start: 2019-10-21 | End: 2019-10-24

## 2019-10-21 RX ORDER — MORPHINE SULFATE 4 MG/ML
2 INJECTION, SOLUTION INTRAMUSCULAR; INTRAVENOUS EVERY 5 MIN PRN
Status: DISCONTINUED | OUTPATIENT
Start: 2019-10-21 | End: 2019-10-21 | Stop reason: HOSPADM

## 2019-10-21 RX ORDER — ONDANSETRON 2 MG/ML
INJECTION INTRAMUSCULAR; INTRAVENOUS PRN
Status: DISCONTINUED | OUTPATIENT
Start: 2019-10-21 | End: 2019-10-21 | Stop reason: SDUPTHER

## 2019-10-21 RX ORDER — MORPHINE SULFATE 4 MG/ML
1 INJECTION, SOLUTION INTRAMUSCULAR; INTRAVENOUS EVERY 5 MIN PRN
Status: DISCONTINUED | OUTPATIENT
Start: 2019-10-21 | End: 2019-10-21 | Stop reason: HOSPADM

## 2019-10-21 RX ORDER — ENALAPRILAT 2.5 MG/2ML
1.25 INJECTION INTRAVENOUS
Status: DISCONTINUED | OUTPATIENT
Start: 2019-10-21 | End: 2019-10-21 | Stop reason: HOSPADM

## 2019-10-21 RX ORDER — SODIUM CHLORIDE, SODIUM LACTATE, POTASSIUM CHLORIDE, CALCIUM CHLORIDE 600; 310; 30; 20 MG/100ML; MG/100ML; MG/100ML; MG/100ML
INJECTION, SOLUTION INTRAVENOUS CONTINUOUS
Status: DISCONTINUED | OUTPATIENT
Start: 2019-10-21 | End: 2019-10-21 | Stop reason: HOSPADM

## 2019-10-21 RX ORDER — PROPOFOL 10 MG/ML
INJECTION, EMULSION INTRAVENOUS PRN
Status: DISCONTINUED | OUTPATIENT
Start: 2019-10-21 | End: 2019-10-21 | Stop reason: SDUPTHER

## 2019-10-21 RX ORDER — MIDAZOLAM HYDROCHLORIDE 1 MG/ML
INJECTION INTRAMUSCULAR; INTRAVENOUS PRN
Status: DISCONTINUED | OUTPATIENT
Start: 2019-10-21 | End: 2019-10-21 | Stop reason: SDUPTHER

## 2019-10-21 RX ORDER — HYDRALAZINE HYDROCHLORIDE 20 MG/ML
5 INJECTION INTRAMUSCULAR; INTRAVENOUS EVERY 10 MIN PRN
Status: DISCONTINUED | OUTPATIENT
Start: 2019-10-21 | End: 2019-10-21 | Stop reason: HOSPADM

## 2019-10-21 RX ORDER — DIPHENHYDRAMINE HYDROCHLORIDE 50 MG/ML
12.5 INJECTION INTRAMUSCULAR; INTRAVENOUS
Status: DISCONTINUED | OUTPATIENT
Start: 2019-10-21 | End: 2019-10-21 | Stop reason: HOSPADM

## 2019-10-21 RX ORDER — FENTANYL CITRATE 50 UG/ML
INJECTION, SOLUTION INTRAMUSCULAR; INTRAVENOUS PRN
Status: DISCONTINUED | OUTPATIENT
Start: 2019-10-21 | End: 2019-10-21 | Stop reason: SDUPTHER

## 2019-10-21 RX ORDER — LIDOCAINE HYDROCHLORIDE 10 MG/ML
INJECTION, SOLUTION EPIDURAL; INFILTRATION; INTRACAUDAL; PERINEURAL PRN
Status: DISCONTINUED | OUTPATIENT
Start: 2019-10-21 | End: 2019-10-21 | Stop reason: SDUPTHER

## 2019-10-21 RX ORDER — LABETALOL 20 MG/4 ML (5 MG/ML) INTRAVENOUS SYRINGE
5 EVERY 10 MIN PRN
Status: DISCONTINUED | OUTPATIENT
Start: 2019-10-21 | End: 2019-10-21 | Stop reason: HOSPADM

## 2019-10-21 RX ORDER — MEPERIDINE HYDROCHLORIDE 50 MG/ML
12.5 INJECTION INTRAMUSCULAR; INTRAVENOUS; SUBCUTANEOUS EVERY 5 MIN PRN
Status: DISCONTINUED | OUTPATIENT
Start: 2019-10-21 | End: 2019-10-21 | Stop reason: HOSPADM

## 2019-10-21 RX ORDER — PROMETHAZINE HYDROCHLORIDE 25 MG/ML
6.25 INJECTION, SOLUTION INTRAMUSCULAR; INTRAVENOUS
Status: COMPLETED | OUTPATIENT
Start: 2019-10-21 | End: 2019-10-21

## 2019-10-21 RX ORDER — ONDANSETRON 2 MG/ML
4 INJECTION INTRAMUSCULAR; INTRAVENOUS
Status: DISCONTINUED | OUTPATIENT
Start: 2019-10-21 | End: 2019-10-21 | Stop reason: HOSPADM

## 2019-10-21 RX ORDER — SUCCINYLCHOLINE CHLORIDE 20 MG/ML
INJECTION INTRAMUSCULAR; INTRAVENOUS PRN
Status: DISCONTINUED | OUTPATIENT
Start: 2019-10-21 | End: 2019-10-21 | Stop reason: SDUPTHER

## 2019-10-21 RX ORDER — ROCURONIUM BROMIDE 10 MG/ML
INJECTION, SOLUTION INTRAVENOUS PRN
Status: DISCONTINUED | OUTPATIENT
Start: 2019-10-21 | End: 2019-10-21 | Stop reason: SDUPTHER

## 2019-10-21 RX ORDER — OXYCODONE HYDROCHLORIDE AND ACETAMINOPHEN 325; 5 MG/5ML; MG/5ML
7.5 SOLUTION ORAL EVERY 6 HOURS
Qty: 90 ML | Refills: 0 | Status: SHIPPED | OUTPATIENT
Start: 2019-10-21 | End: 2019-10-21 | Stop reason: SDUPTHER

## 2019-10-21 RX ORDER — SUCRALFATE 1 G/1
1 TABLET ORAL
Qty: 120 TABLET | Refills: 3 | Status: SHIPPED | OUTPATIENT
Start: 2019-10-21 | End: 2019-11-11

## 2019-10-21 RX ORDER — DEXAMETHASONE SODIUM PHOSPHATE 10 MG/ML
INJECTION INTRAMUSCULAR; INTRAVENOUS PRN
Status: DISCONTINUED | OUTPATIENT
Start: 2019-10-21 | End: 2019-10-21 | Stop reason: SDUPTHER

## 2019-10-21 RX ADMIN — LIDOCAINE HYDROCHLORIDE 5 ML: 10 INJECTION, SOLUTION EPIDURAL; INFILTRATION; INTRACAUDAL; PERINEURAL at 17:05

## 2019-10-21 RX ADMIN — MIDAZOLAM 2 MG: 1 INJECTION INTRAMUSCULAR; INTRAVENOUS at 16:56

## 2019-10-21 RX ADMIN — PROPOFOL 150 MG: 10 INJECTION, EMULSION INTRAVENOUS at 17:05

## 2019-10-21 RX ADMIN — ROCURONIUM BROMIDE 20 MG: 10 SOLUTION INTRAVENOUS at 17:10

## 2019-10-21 RX ADMIN — FENTANYL CITRATE 50 MCG: 50 INJECTION INTRAMUSCULAR; INTRAVENOUS at 17:05

## 2019-10-21 RX ADMIN — LIDOCAINE HYDROCHLORIDE: 20 SOLUTION ORAL; TOPICAL at 19:50

## 2019-10-21 RX ADMIN — ONDANSETRON HYDROCHLORIDE 4 MG: 2 INJECTION, SOLUTION INTRAMUSCULAR; INTRAVENOUS at 17:05

## 2019-10-21 RX ADMIN — FENTANYL CITRATE 25 MCG: 50 INJECTION INTRAMUSCULAR; INTRAVENOUS at 17:12

## 2019-10-21 RX ADMIN — PROMETHAZINE HYDROCHLORIDE 6.25 MG: 25 INJECTION INTRAMUSCULAR; INTRAVENOUS at 18:46

## 2019-10-21 RX ADMIN — SUGAMMADEX 150 MG: 100 INJECTION, SOLUTION INTRAVENOUS at 18:00

## 2019-10-21 RX ADMIN — FENTANYL CITRATE 25 MCG: 50 INJECTION INTRAMUSCULAR; INTRAVENOUS at 17:25

## 2019-10-21 RX ADMIN — DEXAMETHASONE SODIUM PHOSPHATE 10 MG: 10 INJECTION INTRAMUSCULAR; INTRAVENOUS at 17:05

## 2019-10-21 RX ADMIN — SODIUM CHLORIDE, SODIUM LACTATE, POTASSIUM CHLORIDE, AND CALCIUM CHLORIDE: 600; 310; 30; 20 INJECTION, SOLUTION INTRAVENOUS at 16:56

## 2019-10-21 RX ADMIN — SUCCINYLCHOLINE CHLORIDE 120 MG: 20 INJECTION, SOLUTION INTRAMUSCULAR; INTRAVENOUS at 17:05

## 2019-10-21 RX ADMIN — HYDROMORPHONE HYDROCHLORIDE 0.25 MG: 1 INJECTION, SOLUTION INTRAMUSCULAR; INTRAVENOUS; SUBCUTANEOUS at 19:16

## 2019-10-21 RX ADMIN — ROCURONIUM BROMIDE 10 MG: 10 SOLUTION INTRAVENOUS at 17:05

## 2019-10-21 ASSESSMENT — LIFESTYLE VARIABLES: SMOKING_STATUS: 1

## 2019-10-21 ASSESSMENT — PAIN SCALES - GENERAL
PAINLEVEL_OUTOF10: 0
PAINLEVEL_OUTOF10: 0
PAINLEVEL_OUTOF10: 6
PAINLEVEL_OUTOF10: 0
PAINLEVEL_OUTOF10: 0

## 2019-10-21 ASSESSMENT — ENCOUNTER SYMPTOMS: SHORTNESS OF BREATH: 0

## 2019-10-21 ASSESSMENT — PAIN - FUNCTIONAL ASSESSMENT: PAIN_FUNCTIONAL_ASSESSMENT: 0-10

## 2019-10-23 ENCOUNTER — HOSPITAL ENCOUNTER (OUTPATIENT)
Dept: RADIATION ONCOLOGY | Facility: HOSPITAL | Age: 49
Setting detail: RADIATION/ONCOLOGY SERIES
End: 2019-10-23

## 2019-10-23 ENCOUNTER — HOSPITAL ENCOUNTER (OUTPATIENT)
Dept: RADIATION ONCOLOGY | Facility: HOSPITAL | Age: 49
Discharge: HOME | End: 2019-10-23

## 2019-10-23 ENCOUNTER — CONSULT (OUTPATIENT)
Dept: RADIATION ONCOLOGY | Facility: HOSPITAL | Age: 49
End: 2019-10-23

## 2019-10-23 VITALS
WEIGHT: 146 LBS | BODY MASS INDEX: 20.9 KG/M2 | HEIGHT: 70 IN | DIASTOLIC BLOOD PRESSURE: 78 MMHG | SYSTOLIC BLOOD PRESSURE: 120 MMHG

## 2019-10-23 DIAGNOSIS — Z72.0 CURRENT TOBACCO USE: ICD-10-CM

## 2019-10-23 DIAGNOSIS — K22.89 ESOPHAGEAL MASS: ICD-10-CM

## 2019-10-23 DIAGNOSIS — G89.3 CANCER ASSOCIATED PAIN: Primary | ICD-10-CM

## 2019-10-23 DIAGNOSIS — C15.8 MALIGNANT NEOPLASM OF OVERLAPPING SITES OF ESOPHAGUS (HCC): Primary | ICD-10-CM

## 2019-10-23 DIAGNOSIS — Z71.9 ENCOUNTER FOR CONSULTATION: ICD-10-CM

## 2019-10-23 PROCEDURE — 77334 RADIATION TREATMENT AID(S): CPT | Performed by: RADIOLOGY

## 2019-10-23 PROCEDURE — 77290 THER RAD SIMULAJ FIELD CPLX: CPT | Performed by: RADIOLOGY

## 2019-10-25 ENCOUNTER — HOSPITAL ENCOUNTER (OUTPATIENT)
Dept: GENERAL RADIOLOGY | Age: 49
Discharge: HOME | End: 2019-10-25
Payer: COMMERCIAL

## 2019-10-25 DIAGNOSIS — G89.3 CANCER ASSOCIATED PAIN: ICD-10-CM

## 2019-10-25 DIAGNOSIS — K22.89 ESOPHAGEAL MASS: ICD-10-CM

## 2019-10-25 PROCEDURE — 71045 X-RAY EXAM CHEST 1 VIEW: CPT

## 2019-10-28 ENCOUNTER — HOSPITAL ENCOUNTER (OUTPATIENT)
Dept: INFUSION THERAPY | Age: 49
Discharge: HOME | End: 2019-10-28
Payer: COMMERCIAL

## 2019-10-28 ENCOUNTER — CLINICAL DOCUMENTATION (OUTPATIENT)
Dept: HEMATOLOGY | Age: 49
End: 2019-10-28

## 2019-10-28 ENCOUNTER — OFFICE VISIT (OUTPATIENT)
Dept: HEMATOLOGY | Age: 49
End: 2019-10-28
Payer: COMMERCIAL

## 2019-10-28 ENCOUNTER — ANESTHESIA EVENT (OUTPATIENT)
Dept: ENDOSCOPY | Age: 49
End: 2019-10-28
Payer: COMMERCIAL

## 2019-10-28 ENCOUNTER — HOSPITAL ENCOUNTER (OUTPATIENT)
Age: 49
Setting detail: OUTPATIENT SURGERY
Discharge: HOME | End: 2019-10-28
Attending: INTERNAL MEDICINE | Admitting: INTERNAL MEDICINE
Payer: COMMERCIAL

## 2019-10-28 ENCOUNTER — ANESTHESIA (OUTPATIENT)
Dept: ENDOSCOPY | Age: 49
End: 2019-10-28
Payer: COMMERCIAL

## 2019-10-28 VITALS
BODY MASS INDEX: 20.92 KG/M2 | OXYGEN SATURATION: 98 % | HEIGHT: 70 IN | WEIGHT: 146.1 LBS | DIASTOLIC BLOOD PRESSURE: 82 MMHG | HEART RATE: 86 BPM | SYSTOLIC BLOOD PRESSURE: 110 MMHG

## 2019-10-28 VITALS
WEIGHT: 146 LBS | HEART RATE: 61 BPM | HEIGHT: 70 IN | SYSTOLIC BLOOD PRESSURE: 110 MMHG | RESPIRATION RATE: 18 BRPM | BODY MASS INDEX: 20.9 KG/M2 | DIASTOLIC BLOOD PRESSURE: 76 MMHG | TEMPERATURE: 97.9 F | OXYGEN SATURATION: 98 %

## 2019-10-28 VITALS
DIASTOLIC BLOOD PRESSURE: 65 MMHG | TEMPERATURE: 97 F | OXYGEN SATURATION: 97 % | SYSTOLIC BLOOD PRESSURE: 101 MMHG | RESPIRATION RATE: 14 BRPM

## 2019-10-28 DIAGNOSIS — C15.9 ESOPHAGEAL ADENOCARCINOMA (HCC): Primary | ICD-10-CM

## 2019-10-28 DIAGNOSIS — C15.9 ESOPHAGEAL ADENOCARCINOMA (HCC): ICD-10-CM

## 2019-10-28 PROCEDURE — 2709999900 HC NON-CHARGEABLE SUPPLY: Performed by: INTERNAL MEDICINE

## 2019-10-28 PROCEDURE — 43247 EGD REMOVE FOREIGN BODY: CPT | Performed by: INTERNAL MEDICINE

## 2019-10-28 PROCEDURE — 6360000002 HC RX W HCPCS

## 2019-10-28 PROCEDURE — 3700000001 HC ADD 15 MINUTES (ANESTHESIA): Performed by: INTERNAL MEDICINE

## 2019-10-28 PROCEDURE — 7100000010 HC PHASE II RECOVERY - FIRST 15 MIN: Performed by: INTERNAL MEDICINE

## 2019-10-28 PROCEDURE — 7100000000 HC PACU RECOVERY - FIRST 15 MIN: Performed by: INTERNAL MEDICINE

## 2019-10-28 PROCEDURE — 2580000003 HC RX 258: Performed by: INTERNAL MEDICINE

## 2019-10-28 PROCEDURE — 99215 OFFICE O/P EST HI 40 MIN: CPT | Performed by: INTERNAL MEDICINE

## 2019-10-28 PROCEDURE — 7100000001 HC PACU RECOVERY - ADDTL 15 MIN: Performed by: INTERNAL MEDICINE

## 2019-10-28 PROCEDURE — 3609017100 HC EGD: Performed by: INTERNAL MEDICINE

## 2019-10-28 PROCEDURE — 2500000003 HC RX 250 WO HCPCS

## 2019-10-28 PROCEDURE — 3700000000 HC ANESTHESIA ATTENDED CARE: Performed by: INTERNAL MEDICINE

## 2019-10-28 PROCEDURE — 85025 COMPLETE CBC W/AUTO DIFF WBC: CPT

## 2019-10-28 PROCEDURE — 2720000010 HC SURG SUPPLY STERILE: Performed by: INTERNAL MEDICINE

## 2019-10-28 RX ORDER — ROCURONIUM BROMIDE 10 MG/ML
INJECTION, SOLUTION INTRAVENOUS PRN
Status: DISCONTINUED | OUTPATIENT
Start: 2019-10-28 | End: 2019-10-28 | Stop reason: SDUPTHER

## 2019-10-28 RX ORDER — SUCCINYLCHOLINE CHLORIDE 20 MG/ML
INJECTION INTRAMUSCULAR; INTRAVENOUS PRN
Status: DISCONTINUED | OUTPATIENT
Start: 2019-10-28 | End: 2019-10-28 | Stop reason: SDUPTHER

## 2019-10-28 RX ORDER — SODIUM CHLORIDE, SODIUM LACTATE, POTASSIUM CHLORIDE, CALCIUM CHLORIDE 600; 310; 30; 20 MG/100ML; MG/100ML; MG/100ML; MG/100ML
INJECTION, SOLUTION INTRAVENOUS CONTINUOUS
Status: DISCONTINUED | OUTPATIENT
Start: 2019-10-28 | End: 2019-10-28 | Stop reason: HOSPADM

## 2019-10-28 RX ORDER — PROPOFOL 10 MG/ML
INJECTION, EMULSION INTRAVENOUS PRN
Status: DISCONTINUED | OUTPATIENT
Start: 2019-10-28 | End: 2019-10-28 | Stop reason: SDUPTHER

## 2019-10-28 RX ORDER — ONDANSETRON 2 MG/ML
INJECTION INTRAMUSCULAR; INTRAVENOUS PRN
Status: DISCONTINUED | OUTPATIENT
Start: 2019-10-28 | End: 2019-10-28 | Stop reason: SDUPTHER

## 2019-10-28 RX ORDER — DEXAMETHASONE SODIUM PHOSPHATE 10 MG/ML
INJECTION INTRAMUSCULAR; INTRAVENOUS PRN
Status: DISCONTINUED | OUTPATIENT
Start: 2019-10-28 | End: 2019-10-28 | Stop reason: SDUPTHER

## 2019-10-28 RX ORDER — LIDOCAINE HYDROCHLORIDE 10 MG/ML
INJECTION, SOLUTION EPIDURAL; INFILTRATION; INTRACAUDAL; PERINEURAL PRN
Status: DISCONTINUED | OUTPATIENT
Start: 2019-10-28 | End: 2019-10-28 | Stop reason: SDUPTHER

## 2019-10-28 RX ADMIN — SODIUM CHLORIDE, SODIUM LACTATE, POTASSIUM CHLORIDE, AND CALCIUM CHLORIDE: 600; 310; 30; 20 INJECTION, SOLUTION INTRAVENOUS at 14:41

## 2019-10-28 RX ADMIN — SUCCINYLCHOLINE CHLORIDE 140 MG: 20 INJECTION, SOLUTION INTRAMUSCULAR; INTRAVENOUS at 16:23

## 2019-10-28 RX ADMIN — PROPOFOL 160 MG: 10 INJECTION, EMULSION INTRAVENOUS at 16:23

## 2019-10-28 RX ADMIN — ONDANSETRON HYDROCHLORIDE 4 MG: 2 INJECTION, SOLUTION INTRAMUSCULAR; INTRAVENOUS at 16:30

## 2019-10-28 RX ADMIN — DEXAMETHASONE SODIUM PHOSPHATE 10 MG: 10 INJECTION INTRAMUSCULAR; INTRAVENOUS at 16:30

## 2019-10-28 RX ADMIN — LIDOCAINE HYDROCHLORIDE 50 MG: 10 INJECTION, SOLUTION EPIDURAL; INFILTRATION; INTRACAUDAL; PERINEURAL at 16:23

## 2019-10-28 RX ADMIN — SUGAMMADEX 130 MG: 100 INJECTION, SOLUTION INTRAVENOUS at 16:51

## 2019-10-28 RX ADMIN — ROCURONIUM BROMIDE 30 MG: 10 SOLUTION INTRAVENOUS at 16:37

## 2019-10-28 ASSESSMENT — LIFESTYLE VARIABLES: SMOKING_STATUS: 1

## 2019-10-28 ASSESSMENT — PAIN SCALES - GENERAL: PAINLEVEL_OUTOF10: 0

## 2019-10-28 ASSESSMENT — ENCOUNTER SYMPTOMS: SHORTNESS OF BREATH: 0

## 2019-10-30 RX ORDER — HEPARIN SODIUM (PORCINE) LOCK FLUSH IV SOLN 100 UNIT/ML 100 UNIT/ML
500 SOLUTION INTRAVENOUS PRN
Status: CANCELLED | OUTPATIENT
Start: 2019-11-04

## 2019-10-30 RX ORDER — SODIUM CHLORIDE 9 MG/ML
INJECTION, SOLUTION INTRAVENOUS CONTINUOUS
Status: CANCELLED | OUTPATIENT
Start: 2019-11-04

## 2019-10-30 RX ORDER — SODIUM CHLORIDE 0.9 % (FLUSH) 0.9 %
5 SYRINGE (ML) INJECTION PRN
Status: CANCELLED | OUTPATIENT
Start: 2019-11-04

## 2019-10-30 RX ORDER — DIPHENHYDRAMINE HYDROCHLORIDE 50 MG/ML
25 INJECTION INTRAMUSCULAR; INTRAVENOUS ONCE
Status: CANCELLED | OUTPATIENT
Start: 2019-11-04

## 2019-10-30 RX ORDER — SODIUM CHLORIDE 0.9 % (FLUSH) 0.9 %
10 SYRINGE (ML) INJECTION PRN
Status: CANCELLED | OUTPATIENT
Start: 2019-11-04

## 2019-10-30 RX ORDER — DIPHENHYDRAMINE HYDROCHLORIDE 50 MG/ML
50 INJECTION INTRAMUSCULAR; INTRAVENOUS ONCE
Status: CANCELLED | OUTPATIENT
Start: 2019-11-04

## 2019-10-30 RX ORDER — METHYLPREDNISOLONE SODIUM SUCCINATE 125 MG/2ML
125 INJECTION, POWDER, LYOPHILIZED, FOR SOLUTION INTRAMUSCULAR; INTRAVENOUS ONCE
Status: CANCELLED | OUTPATIENT
Start: 2019-11-04

## 2019-10-30 RX ORDER — EPINEPHRINE 1 MG/ML
0.3 INJECTION, SOLUTION, CONCENTRATE INTRAVENOUS PRN
Status: CANCELLED | OUTPATIENT
Start: 2019-11-04

## 2019-10-30 RX ORDER — PALONOSETRON 0.05 MG/ML
0.25 INJECTION, SOLUTION INTRAVENOUS ONCE
Status: CANCELLED | OUTPATIENT
Start: 2019-11-04

## 2019-10-30 RX ORDER — DEXAMETHASONE SODIUM PHOSPHATE 10 MG/ML
10 INJECTION, SOLUTION INTRAMUSCULAR; INTRAVENOUS ONCE
Status: CANCELLED | OUTPATIENT
Start: 2019-11-04

## 2019-10-30 RX ORDER — SODIUM CHLORIDE 9 MG/ML
20 INJECTION, SOLUTION INTRAVENOUS ONCE
Status: CANCELLED | OUTPATIENT
Start: 2019-11-04

## 2019-11-01 ENCOUNTER — HOSPITAL ENCOUNTER (OUTPATIENT)
Dept: RADIATION ONCOLOGY | Facility: HOSPITAL | Age: 49
Setting detail: RADIATION/ONCOLOGY SERIES
End: 2019-11-01

## 2019-11-04 ENCOUNTER — HOSPITAL ENCOUNTER (OUTPATIENT)
Dept: INFUSION THERAPY | Age: 49
Discharge: HOME | End: 2019-11-04
Payer: COMMERCIAL

## 2019-11-04 VITALS
DIASTOLIC BLOOD PRESSURE: 74 MMHG | TEMPERATURE: 97.1 F | WEIGHT: 152.6 LBS | BODY MASS INDEX: 21.85 KG/M2 | SYSTOLIC BLOOD PRESSURE: 118 MMHG | HEIGHT: 70 IN | HEART RATE: 84 BPM | RESPIRATION RATE: 17 BRPM | OXYGEN SATURATION: 94 %

## 2019-11-04 DIAGNOSIS — C15.9 ESOPHAGEAL ADENOCARCINOMA (HCC): Primary | ICD-10-CM

## 2019-11-04 DIAGNOSIS — C15.9 ESOPHAGEAL ADENOCARCINOMA (HCC): ICD-10-CM

## 2019-11-04 DIAGNOSIS — C15.9 MALIGNANT NEOPLASM OF ESOPHAGUS, UNSPECIFIED LOCATION (HCC): Primary | ICD-10-CM

## 2019-11-04 PROCEDURE — 6360000002 HC RX W HCPCS: Performed by: INTERNAL MEDICINE

## 2019-11-04 PROCEDURE — 96413 CHEMO IV INFUSION 1 HR: CPT

## 2019-11-04 PROCEDURE — 2580000003 HC RX 258: Performed by: INTERNAL MEDICINE

## 2019-11-04 PROCEDURE — 85025 COMPLETE CBC W/AUTO DIFF WBC: CPT

## 2019-11-04 PROCEDURE — 96375 TX/PRO/DX INJ NEW DRUG ADDON: CPT

## 2019-11-04 PROCEDURE — 96417 CHEMO IV INFUS EACH ADDL SEQ: CPT

## 2019-11-04 PROCEDURE — 2500000003 HC RX 250 WO HCPCS: Performed by: INTERNAL MEDICINE

## 2019-11-04 RX ORDER — DEXAMETHASONE SODIUM PHOSPHATE 10 MG/ML
10 INJECTION, SOLUTION INTRAMUSCULAR; INTRAVENOUS ONCE
Status: COMPLETED | OUTPATIENT
Start: 2019-11-04 | End: 2019-11-04

## 2019-11-04 RX ORDER — DIPHENHYDRAMINE HYDROCHLORIDE 50 MG/ML
25 INJECTION INTRAMUSCULAR; INTRAVENOUS ONCE
Status: COMPLETED | OUTPATIENT
Start: 2019-11-04 | End: 2019-11-04

## 2019-11-04 RX ORDER — PALONOSETRON 0.05 MG/ML
0.25 INJECTION, SOLUTION INTRAVENOUS ONCE
Status: COMPLETED | OUTPATIENT
Start: 2019-11-04 | End: 2019-11-04

## 2019-11-04 RX ADMIN — PACLITAXEL 90 MG: 6 INJECTION, SOLUTION INTRAVENOUS at 14:13

## 2019-11-04 RX ADMIN — DIPHENHYDRAMINE HYDROCHLORIDE 25 MG: 50 INJECTION, SOLUTION INTRAMUSCULAR; INTRAVENOUS at 13:41

## 2019-11-04 RX ADMIN — PALONOSETRON HYDROCHLORIDE 0.25 MG: 0.25 INJECTION, SOLUTION INTRAVENOUS at 13:46

## 2019-11-04 RX ADMIN — CARBOPLATIN 222 MG: 10 INJECTION, SOLUTION INTRAVENOUS at 15:23

## 2019-11-04 RX ADMIN — FAMOTIDINE 20 MG: 10 INJECTION INTRAVENOUS at 13:37

## 2019-11-04 RX ADMIN — DEXAMETHASONE SODIUM PHOSPHATE 10 MG: 10 INJECTION, SOLUTION INTRAMUSCULAR; INTRAVENOUS at 13:43

## 2019-11-04 ASSESSMENT — PAIN SCALES - GENERAL: PAINLEVEL_OUTOF10: 0

## 2019-11-05 PROCEDURE — 77338 DESIGN MLC DEVICE FOR IMRT: CPT | Performed by: RADIOLOGY

## 2019-11-05 PROCEDURE — 77300 RADIATION THERAPY DOSE PLAN: CPT | Performed by: RADIOLOGY

## 2019-11-05 PROCEDURE — 77301 RADIOTHERAPY DOSE PLAN IMRT: CPT | Performed by: RADIOLOGY

## 2019-11-11 ENCOUNTER — OFFICE VISIT (OUTPATIENT)
Dept: HEMATOLOGY | Age: 49
End: 2019-11-11
Payer: COMMERCIAL

## 2019-11-11 ENCOUNTER — HOSPITAL ENCOUNTER (OUTPATIENT)
Dept: INFUSION THERAPY | Age: 49
Discharge: HOME | End: 2019-11-11
Payer: COMMERCIAL

## 2019-11-11 VITALS
BODY MASS INDEX: 22.58 KG/M2 | SYSTOLIC BLOOD PRESSURE: 120 MMHG | HEIGHT: 70 IN | OXYGEN SATURATION: 99 % | HEART RATE: 65 BPM | WEIGHT: 157.7 LBS | TEMPERATURE: 97.1 F | RESPIRATION RATE: 16 BRPM | DIASTOLIC BLOOD PRESSURE: 62 MMHG

## 2019-11-11 DIAGNOSIS — C15.9 ESOPHAGEAL ADENOCARCINOMA (HCC): Primary | ICD-10-CM

## 2019-11-11 DIAGNOSIS — C15.9 ESOPHAGEAL ADENOCARCINOMA (HCC): ICD-10-CM

## 2019-11-11 DIAGNOSIS — C15.9 MALIGNANT NEOPLASM OF ESOPHAGUS, UNSPECIFIED LOCATION (HCC): Primary | ICD-10-CM

## 2019-11-11 PROCEDURE — 96375 TX/PRO/DX INJ NEW DRUG ADDON: CPT

## 2019-11-11 PROCEDURE — 2580000003 HC RX 258: Performed by: INTERNAL MEDICINE

## 2019-11-11 PROCEDURE — 99214 OFFICE O/P EST MOD 30 MIN: CPT | Performed by: INTERNAL MEDICINE

## 2019-11-11 PROCEDURE — 96374 THER/PROPH/DIAG INJ IV PUSH: CPT

## 2019-11-11 PROCEDURE — 96417 CHEMO IV INFUS EACH ADDL SEQ: CPT

## 2019-11-11 PROCEDURE — 85025 COMPLETE CBC W/AUTO DIFF WBC: CPT

## 2019-11-11 PROCEDURE — 6360000002 HC RX W HCPCS: Performed by: INTERNAL MEDICINE

## 2019-11-11 PROCEDURE — 2500000003 HC RX 250 WO HCPCS: Performed by: INTERNAL MEDICINE

## 2019-11-11 PROCEDURE — 96413 CHEMO IV INFUSION 1 HR: CPT

## 2019-11-11 RX ORDER — SODIUM CHLORIDE 0.9 % (FLUSH) 0.9 %
5 SYRINGE (ML) INJECTION PRN
Status: CANCELLED | OUTPATIENT
Start: 2019-11-11

## 2019-11-11 RX ORDER — PALONOSETRON 0.05 MG/ML
0.25 INJECTION, SOLUTION INTRAVENOUS ONCE
Status: COMPLETED | OUTPATIENT
Start: 2019-11-11 | End: 2019-11-11

## 2019-11-11 RX ORDER — PALONOSETRON 0.05 MG/ML
0.25 INJECTION, SOLUTION INTRAVENOUS ONCE
Status: CANCELLED | OUTPATIENT
Start: 2019-11-11

## 2019-11-11 RX ORDER — DEXAMETHASONE SODIUM PHOSPHATE 10 MG/ML
10 INJECTION, SOLUTION INTRAMUSCULAR; INTRAVENOUS ONCE
Status: COMPLETED | OUTPATIENT
Start: 2019-11-11 | End: 2019-11-11

## 2019-11-11 RX ORDER — HEPARIN SODIUM (PORCINE) LOCK FLUSH IV SOLN 100 UNIT/ML 100 UNIT/ML
500 SOLUTION INTRAVENOUS PRN
Status: CANCELLED | OUTPATIENT
Start: 2019-11-11

## 2019-11-11 RX ORDER — SODIUM CHLORIDE 9 MG/ML
20 INJECTION, SOLUTION INTRAVENOUS ONCE
Status: CANCELLED | OUTPATIENT
Start: 2019-11-11

## 2019-11-11 RX ORDER — SODIUM CHLORIDE 0.9 % (FLUSH) 0.9 %
10 SYRINGE (ML) INJECTION PRN
Status: DISCONTINUED | OUTPATIENT
Start: 2019-11-11 | End: 2019-11-12 | Stop reason: HOSPADM

## 2019-11-11 RX ORDER — SODIUM CHLORIDE 0.9 % (FLUSH) 0.9 %
10 SYRINGE (ML) INJECTION PRN
Status: CANCELLED | OUTPATIENT
Start: 2019-11-11

## 2019-11-11 RX ORDER — DIPHENHYDRAMINE HYDROCHLORIDE 50 MG/ML
25 INJECTION INTRAMUSCULAR; INTRAVENOUS ONCE
Status: CANCELLED | OUTPATIENT
Start: 2019-11-11

## 2019-11-11 RX ORDER — HEPARIN SODIUM (PORCINE) LOCK FLUSH IV SOLN 100 UNIT/ML 100 UNIT/ML
500 SOLUTION INTRAVENOUS PRN
Status: DISCONTINUED | OUTPATIENT
Start: 2019-11-11 | End: 2019-11-12 | Stop reason: HOSPADM

## 2019-11-11 RX ORDER — EPINEPHRINE 1 MG/ML
0.3 INJECTION, SOLUTION, CONCENTRATE INTRAVENOUS PRN
Status: CANCELLED | OUTPATIENT
Start: 2019-11-11

## 2019-11-11 RX ORDER — METHYLPREDNISOLONE SODIUM SUCCINATE 125 MG/2ML
125 INJECTION, POWDER, LYOPHILIZED, FOR SOLUTION INTRAMUSCULAR; INTRAVENOUS ONCE
Status: CANCELLED | OUTPATIENT
Start: 2019-11-11

## 2019-11-11 RX ORDER — SODIUM CHLORIDE 9 MG/ML
INJECTION, SOLUTION INTRAVENOUS CONTINUOUS
Status: CANCELLED | OUTPATIENT
Start: 2019-11-11

## 2019-11-11 RX ORDER — DIPHENHYDRAMINE HYDROCHLORIDE 50 MG/ML
25 INJECTION INTRAMUSCULAR; INTRAVENOUS ONCE
Status: COMPLETED | OUTPATIENT
Start: 2019-11-11 | End: 2019-11-11

## 2019-11-11 RX ORDER — DIPHENHYDRAMINE HYDROCHLORIDE 50 MG/ML
50 INJECTION INTRAMUSCULAR; INTRAVENOUS ONCE
Status: CANCELLED | OUTPATIENT
Start: 2019-11-11

## 2019-11-11 RX ADMIN — FAMOTIDINE 20 MG: 10 INJECTION, SOLUTION INTRAVENOUS at 13:10

## 2019-11-11 RX ADMIN — DEXAMETHASONE SODIUM PHOSPHATE 10 MG: 10 INJECTION, SOLUTION INTRAMUSCULAR; INTRAVENOUS at 13:10

## 2019-11-11 RX ADMIN — PACLITAXEL 90 MG: 6 INJECTION, SOLUTION INTRAVENOUS at 13:50

## 2019-11-11 RX ADMIN — PALONOSETRON HYDROCHLORIDE 0.25 MG: 0.25 INJECTION, SOLUTION INTRAVENOUS at 13:10

## 2019-11-11 RX ADMIN — CARBOPLATIN 222 MG: 10 INJECTION INTRAVENOUS at 14:56

## 2019-11-11 RX ADMIN — DIPHENHYDRAMINE HYDROCHLORIDE 50 MG: 50 INJECTION, SOLUTION INTRAMUSCULAR; INTRAVENOUS at 13:11

## 2019-11-11 ASSESSMENT — PAIN SCALES - GENERAL: PAINLEVEL_OUTOF10: 0

## 2019-11-12 ENCOUNTER — HOSPITAL ENCOUNTER (OUTPATIENT)
Dept: RADIATION ONCOLOGY | Facility: HOSPITAL | Age: 49
Discharge: HOME | End: 2019-11-12

## 2019-11-12 PROCEDURE — 77386: CPT | Performed by: RADIOLOGY

## 2019-11-13 ENCOUNTER — HOSPITAL ENCOUNTER (OUTPATIENT)
Dept: RADIATION ONCOLOGY | Facility: HOSPITAL | Age: 49
Discharge: HOME | End: 2019-11-13

## 2019-11-13 PROCEDURE — 77386: CPT | Performed by: RADIOLOGY

## 2019-11-14 ENCOUNTER — HOSPITAL ENCOUNTER (OUTPATIENT)
Dept: RADIATION ONCOLOGY | Facility: HOSPITAL | Age: 49
Discharge: HOME | End: 2019-11-14

## 2019-11-14 PROCEDURE — 77336 RADIATION PHYSICS CONSULT: CPT | Performed by: RADIOLOGY

## 2019-11-14 PROCEDURE — 77386: CPT | Performed by: RADIOLOGY

## 2019-11-15 ENCOUNTER — HOSPITAL ENCOUNTER (OUTPATIENT)
Dept: RADIATION ONCOLOGY | Facility: HOSPITAL | Age: 49
Discharge: HOME | End: 2019-11-15

## 2019-11-15 PROCEDURE — 77386: CPT | Performed by: RADIOLOGY

## 2019-11-15 RX ORDER — SODIUM CHLORIDE 9 MG/ML
20 INJECTION, SOLUTION INTRAVENOUS ONCE
Status: CANCELLED | OUTPATIENT
Start: 2019-11-18

## 2019-11-15 RX ORDER — METHYLPREDNISOLONE SODIUM SUCCINATE 125 MG/2ML
125 INJECTION, POWDER, LYOPHILIZED, FOR SOLUTION INTRAMUSCULAR; INTRAVENOUS ONCE
Status: CANCELLED | OUTPATIENT
Start: 2019-11-18

## 2019-11-15 RX ORDER — HEPARIN SODIUM (PORCINE) LOCK FLUSH IV SOLN 100 UNIT/ML 100 UNIT/ML
500 SOLUTION INTRAVENOUS PRN
Status: CANCELLED | OUTPATIENT
Start: 2019-11-18

## 2019-11-15 RX ORDER — SODIUM CHLORIDE 0.9 % (FLUSH) 0.9 %
5 SYRINGE (ML) INJECTION PRN
Status: CANCELLED | OUTPATIENT
Start: 2019-11-18

## 2019-11-15 RX ORDER — DIPHENHYDRAMINE HYDROCHLORIDE 50 MG/ML
50 INJECTION INTRAMUSCULAR; INTRAVENOUS ONCE
Status: CANCELLED | OUTPATIENT
Start: 2019-11-18

## 2019-11-15 RX ORDER — SODIUM CHLORIDE 0.9 % (FLUSH) 0.9 %
10 SYRINGE (ML) INJECTION PRN
Status: CANCELLED | OUTPATIENT
Start: 2019-11-18

## 2019-11-15 RX ORDER — EPINEPHRINE 1 MG/ML
0.3 INJECTION, SOLUTION, CONCENTRATE INTRAVENOUS PRN
Status: CANCELLED | OUTPATIENT
Start: 2019-11-18

## 2019-11-15 RX ORDER — DIPHENHYDRAMINE HYDROCHLORIDE 50 MG/ML
25 INJECTION INTRAMUSCULAR; INTRAVENOUS ONCE
Status: CANCELLED | OUTPATIENT
Start: 2019-11-18

## 2019-11-15 RX ORDER — PALONOSETRON 0.05 MG/ML
0.25 INJECTION, SOLUTION INTRAVENOUS ONCE
Status: CANCELLED | OUTPATIENT
Start: 2019-11-18

## 2019-11-15 RX ORDER — SODIUM CHLORIDE 9 MG/ML
INJECTION, SOLUTION INTRAVENOUS CONTINUOUS
Status: CANCELLED | OUTPATIENT
Start: 2019-11-18

## 2019-11-18 ENCOUNTER — HOSPITAL ENCOUNTER (OUTPATIENT)
Dept: INFUSION THERAPY | Age: 49
Discharge: HOME | End: 2019-11-18
Payer: COMMERCIAL

## 2019-11-18 ENCOUNTER — OFFICE VISIT (OUTPATIENT)
Dept: HEMATOLOGY | Age: 49
End: 2019-11-18
Payer: COMMERCIAL

## 2019-11-18 ENCOUNTER — HOSPITAL ENCOUNTER (OUTPATIENT)
Dept: RADIATION ONCOLOGY | Facility: HOSPITAL | Age: 49
Discharge: HOME | End: 2019-11-18

## 2019-11-18 VITALS
DIASTOLIC BLOOD PRESSURE: 72 MMHG | WEIGHT: 153.5 LBS | BODY MASS INDEX: 22.02 KG/M2 | TEMPERATURE: 98.2 F | HEART RATE: 95 BPM | SYSTOLIC BLOOD PRESSURE: 108 MMHG

## 2019-11-18 DIAGNOSIS — C15.9 ESOPHAGEAL ADENOCARCINOMA (HCC): ICD-10-CM

## 2019-11-18 DIAGNOSIS — C15.9 ESOPHAGEAL ADENOCARCINOMA (HCC): Primary | ICD-10-CM

## 2019-11-18 DIAGNOSIS — C15.9 MALIGNANT NEOPLASM OF ESOPHAGUS, UNSPECIFIED LOCATION (HCC): Primary | ICD-10-CM

## 2019-11-18 PROCEDURE — 96374 THER/PROPH/DIAG INJ IV PUSH: CPT

## 2019-11-18 PROCEDURE — 85025 COMPLETE CBC W/AUTO DIFF WBC: CPT

## 2019-11-18 PROCEDURE — 80053 COMPREHEN METABOLIC PANEL: CPT

## 2019-11-18 PROCEDURE — 96417 CHEMO IV INFUS EACH ADDL SEQ: CPT

## 2019-11-18 PROCEDURE — 96375 TX/PRO/DX INJ NEW DRUG ADDON: CPT

## 2019-11-18 PROCEDURE — 96413 CHEMO IV INFUSION 1 HR: CPT

## 2019-11-18 PROCEDURE — 77386: CPT | Performed by: RADIOLOGY

## 2019-11-18 PROCEDURE — 2500000003 HC RX 250 WO HCPCS: Performed by: INTERNAL MEDICINE

## 2019-11-18 PROCEDURE — 99214 OFFICE O/P EST MOD 30 MIN: CPT | Performed by: INTERNAL MEDICINE

## 2019-11-18 PROCEDURE — 2580000003 HC RX 258: Performed by: INTERNAL MEDICINE

## 2019-11-18 PROCEDURE — 6360000002 HC RX W HCPCS: Performed by: INTERNAL MEDICINE

## 2019-11-18 RX ORDER — PALONOSETRON 0.05 MG/ML
0.25 INJECTION, SOLUTION INTRAVENOUS ONCE
Status: COMPLETED | OUTPATIENT
Start: 2019-11-18 | End: 2019-11-18

## 2019-11-18 RX ORDER — SODIUM CHLORIDE 0.9 % (FLUSH) 0.9 %
5 SYRINGE (ML) INJECTION PRN
Status: DISCONTINUED | OUTPATIENT
Start: 2019-11-18 | End: 2019-11-19 | Stop reason: HOSPADM

## 2019-11-18 RX ORDER — SODIUM CHLORIDE 0.9 % (FLUSH) 0.9 %
10 SYRINGE (ML) INJECTION PRN
Status: DISCONTINUED | OUTPATIENT
Start: 2019-11-18 | End: 2019-11-19 | Stop reason: HOSPADM

## 2019-11-18 RX ORDER — DEXAMETHASONE SODIUM PHOSPHATE 10 MG/ML
10 INJECTION, SOLUTION INTRAMUSCULAR; INTRAVENOUS ONCE
Status: COMPLETED | OUTPATIENT
Start: 2019-11-18 | End: 2019-11-18

## 2019-11-18 RX ORDER — DIPHENHYDRAMINE HYDROCHLORIDE 50 MG/ML
25 INJECTION INTRAMUSCULAR; INTRAVENOUS ONCE
Status: COMPLETED | OUTPATIENT
Start: 2019-11-18 | End: 2019-11-18

## 2019-11-18 RX ADMIN — PALONOSETRON 0.25 MG: 0.05 INJECTION, SOLUTION INTRAVENOUS at 13:10

## 2019-11-18 RX ADMIN — CARBOPLATIN 222 MG: 10 INJECTION, SOLUTION INTRAVENOUS at 14:39

## 2019-11-18 RX ADMIN — PACLITAXEL 90 MG: 6 INJECTION, SOLUTION INTRAVENOUS at 13:34

## 2019-11-18 RX ADMIN — FAMOTIDINE 20 MG: 10 INJECTION INTRAVENOUS at 13:10

## 2019-11-18 RX ADMIN — DIPHENHYDRAMINE HYDROCHLORIDE 25 MG: 50 INJECTION, SOLUTION INTRAMUSCULAR; INTRAVENOUS at 13:10

## 2019-11-18 RX ADMIN — DEXAMETHASONE SODIUM PHOSPHATE 10 MG: 10 INJECTION, SOLUTION INTRAMUSCULAR; INTRAVENOUS at 13:10

## 2019-11-19 ENCOUNTER — HOSPITAL ENCOUNTER (OUTPATIENT)
Dept: RADIATION ONCOLOGY | Facility: HOSPITAL | Age: 49
Discharge: HOME | End: 2019-11-19

## 2019-11-19 PROCEDURE — 77386: CPT | Performed by: RADIOLOGY

## 2019-11-20 ENCOUNTER — HOSPITAL ENCOUNTER (OUTPATIENT)
Dept: RADIATION ONCOLOGY | Facility: HOSPITAL | Age: 49
Discharge: HOME | End: 2019-11-20

## 2019-11-20 PROCEDURE — 77386: CPT | Performed by: RADIOLOGY

## 2019-11-21 ENCOUNTER — HOSPITAL ENCOUNTER (OUTPATIENT)
Dept: RADIATION ONCOLOGY | Facility: HOSPITAL | Age: 49
Discharge: HOME | End: 2019-11-21

## 2019-11-21 PROCEDURE — 77386: CPT | Performed by: RADIOLOGY

## 2019-11-21 PROCEDURE — 77336 RADIATION PHYSICS CONSULT: CPT | Performed by: RADIOLOGY

## 2019-11-22 ENCOUNTER — HOSPITAL ENCOUNTER (OUTPATIENT)
Dept: RADIATION ONCOLOGY | Facility: HOSPITAL | Age: 49
Discharge: HOME | End: 2019-11-22

## 2019-11-22 PROCEDURE — 77386: CPT | Performed by: RADIOLOGY

## 2019-11-22 RX ORDER — SODIUM CHLORIDE 0.9 % (FLUSH) 0.9 %
5 SYRINGE (ML) INJECTION PRN
Status: CANCELLED | OUTPATIENT
Start: 2019-11-25

## 2019-11-22 RX ORDER — HEPARIN SODIUM (PORCINE) LOCK FLUSH IV SOLN 100 UNIT/ML 100 UNIT/ML
500 SOLUTION INTRAVENOUS PRN
Status: CANCELLED | OUTPATIENT
Start: 2019-11-25

## 2019-11-22 RX ORDER — SODIUM CHLORIDE 9 MG/ML
INJECTION, SOLUTION INTRAVENOUS CONTINUOUS
Status: CANCELLED | OUTPATIENT
Start: 2019-11-25

## 2019-11-22 RX ORDER — METHYLPREDNISOLONE SODIUM SUCCINATE 125 MG/2ML
125 INJECTION, POWDER, LYOPHILIZED, FOR SOLUTION INTRAMUSCULAR; INTRAVENOUS ONCE
Status: CANCELLED | OUTPATIENT
Start: 2019-11-25

## 2019-11-22 RX ORDER — SODIUM CHLORIDE 0.9 % (FLUSH) 0.9 %
10 SYRINGE (ML) INJECTION PRN
Status: CANCELLED | OUTPATIENT
Start: 2019-11-25

## 2019-11-22 RX ORDER — SODIUM CHLORIDE 9 MG/ML
20 INJECTION, SOLUTION INTRAVENOUS ONCE
Status: CANCELLED | OUTPATIENT
Start: 2019-11-25

## 2019-11-22 RX ORDER — PALONOSETRON 0.05 MG/ML
0.25 INJECTION, SOLUTION INTRAVENOUS ONCE
Status: CANCELLED | OUTPATIENT
Start: 2019-11-25

## 2019-11-22 RX ORDER — EPINEPHRINE 1 MG/ML
0.3 INJECTION, SOLUTION, CONCENTRATE INTRAVENOUS PRN
Status: CANCELLED | OUTPATIENT
Start: 2019-11-25

## 2019-11-22 RX ORDER — DIPHENHYDRAMINE HYDROCHLORIDE 50 MG/ML
25 INJECTION INTRAMUSCULAR; INTRAVENOUS ONCE
Status: CANCELLED | OUTPATIENT
Start: 2019-11-25

## 2019-11-22 RX ORDER — DIPHENHYDRAMINE HYDROCHLORIDE 50 MG/ML
50 INJECTION INTRAMUSCULAR; INTRAVENOUS ONCE
Status: CANCELLED | OUTPATIENT
Start: 2019-11-25

## 2019-11-25 ENCOUNTER — HOSPITAL ENCOUNTER (OUTPATIENT)
Dept: RADIATION ONCOLOGY | Facility: HOSPITAL | Age: 49
Discharge: HOME | End: 2019-11-25

## 2019-11-25 ENCOUNTER — HOSPITAL ENCOUNTER (OUTPATIENT)
Dept: INFUSION THERAPY | Age: 49
Discharge: HOME | End: 2019-11-25
Payer: COMMERCIAL

## 2019-11-25 VITALS
BODY MASS INDEX: 21.63 KG/M2 | DIASTOLIC BLOOD PRESSURE: 80 MMHG | TEMPERATURE: 97.8 F | WEIGHT: 151.1 LBS | HEART RATE: 74 BPM | HEIGHT: 70 IN | SYSTOLIC BLOOD PRESSURE: 120 MMHG

## 2019-11-25 DIAGNOSIS — C15.9 ESOPHAGEAL ADENOCARCINOMA (HCC): ICD-10-CM

## 2019-11-25 DIAGNOSIS — C15.9 ESOPHAGEAL ADENOCARCINOMA (HCC): Primary | ICD-10-CM

## 2019-11-25 DIAGNOSIS — C15.9 MALIGNANT NEOPLASM OF ESOPHAGUS, UNSPECIFIED LOCATION (HCC): Primary | ICD-10-CM

## 2019-11-25 PROCEDURE — 2500000003 HC RX 250 WO HCPCS: Performed by: INTERNAL MEDICINE

## 2019-11-25 PROCEDURE — 77386: CPT | Performed by: RADIOLOGY

## 2019-11-25 PROCEDURE — 96375 TX/PRO/DX INJ NEW DRUG ADDON: CPT

## 2019-11-25 PROCEDURE — 96367 TX/PROPH/DG ADDL SEQ IV INF: CPT

## 2019-11-25 PROCEDURE — 96413 CHEMO IV INFUSION 1 HR: CPT

## 2019-11-25 PROCEDURE — 85025 COMPLETE CBC W/AUTO DIFF WBC: CPT

## 2019-11-25 PROCEDURE — 96417 CHEMO IV INFUS EACH ADDL SEQ: CPT

## 2019-11-25 PROCEDURE — 96415 CHEMO IV INFUSION ADDL HR: CPT

## 2019-11-25 PROCEDURE — 6360000002 HC RX W HCPCS: Performed by: INTERNAL MEDICINE

## 2019-11-25 PROCEDURE — 2580000003 HC RX 258: Performed by: INTERNAL MEDICINE

## 2019-11-25 RX ORDER — DIPHENHYDRAMINE HYDROCHLORIDE 50 MG/ML
12.5 INJECTION INTRAMUSCULAR; INTRAVENOUS ONCE
Status: COMPLETED | OUTPATIENT
Start: 2019-11-25 | End: 2019-11-25

## 2019-11-25 RX ORDER — DEXAMETHASONE SODIUM PHOSPHATE 10 MG/ML
10 INJECTION, SOLUTION INTRAMUSCULAR; INTRAVENOUS ONCE
Status: COMPLETED | OUTPATIENT
Start: 2019-11-25 | End: 2019-11-25

## 2019-11-25 RX ORDER — PALONOSETRON 0.05 MG/ML
0.25 INJECTION, SOLUTION INTRAVENOUS ONCE
Status: COMPLETED | OUTPATIENT
Start: 2019-11-25 | End: 2019-11-25

## 2019-11-25 RX ADMIN — PACLITAXEL 90 MG: 6 INJECTION, SOLUTION INTRAVENOUS at 12:28

## 2019-11-25 RX ADMIN — CARBOPLATIN 222 MG: 10 INJECTION, SOLUTION INTRAVENOUS at 13:34

## 2019-11-25 RX ADMIN — FAMOTIDINE 20 MG: 10 INJECTION, SOLUTION INTRAVENOUS at 12:16

## 2019-11-25 RX ADMIN — PALONOSETRON HYDROCHLORIDE 0.25 MG: 0.25 INJECTION, SOLUTION INTRAVENOUS at 12:09

## 2019-11-25 RX ADMIN — DIPHENHYDRAMINE HYDROCHLORIDE 12.5 MG: 50 INJECTION, SOLUTION INTRAMUSCULAR; INTRAVENOUS at 12:19

## 2019-11-25 RX ADMIN — DEXAMETHASONE SODIUM PHOSPHATE 10 MG: 10 INJECTION, SOLUTION INTRAMUSCULAR; INTRAVENOUS at 12:12

## 2019-11-26 ENCOUNTER — HOSPITAL ENCOUNTER (OUTPATIENT)
Dept: INFUSION THERAPY | Age: 49
Discharge: HOME | End: 2019-11-26
Payer: COMMERCIAL

## 2019-11-26 ENCOUNTER — HOSPITAL ENCOUNTER (OUTPATIENT)
Dept: RADIATION ONCOLOGY | Facility: HOSPITAL | Age: 49
Discharge: HOME | End: 2019-11-26

## 2019-11-26 VITALS
WEIGHT: 151 LBS | OXYGEN SATURATION: 98 % | HEART RATE: 111 BPM | HEIGHT: 70 IN | SYSTOLIC BLOOD PRESSURE: 120 MMHG | DIASTOLIC BLOOD PRESSURE: 74 MMHG | BODY MASS INDEX: 21.62 KG/M2

## 2019-11-26 DIAGNOSIS — R50.9 FEVER AND CHILLS: Primary | ICD-10-CM

## 2019-11-26 PROCEDURE — 85025 COMPLETE CBC W/AUTO DIFF WBC: CPT

## 2019-11-26 PROCEDURE — 36415 COLL VENOUS BLD VENIPUNCTURE: CPT

## 2019-11-26 PROCEDURE — 96374 THER/PROPH/DIAG INJ IV PUSH: CPT

## 2019-11-26 PROCEDURE — 6360000002 HC RX W HCPCS: Performed by: INTERNAL MEDICINE

## 2019-11-26 PROCEDURE — 77386: CPT | Performed by: RADIOLOGY

## 2019-11-26 PROCEDURE — 96365 THER/PROPH/DIAG IV INF INIT: CPT

## 2019-11-26 PROCEDURE — 80053 COMPREHEN METABOLIC PANEL: CPT

## 2019-11-26 PROCEDURE — 2580000003 HC RX 258: Performed by: INTERNAL MEDICINE

## 2019-11-26 RX ORDER — LEVOFLOXACIN 750 MG/1
750 TABLET ORAL DAILY
Qty: 10 TABLET | Refills: 0 | Status: SHIPPED | OUTPATIENT
Start: 2019-11-26 | End: 2019-12-06

## 2019-11-26 RX ADMIN — CEFTRIAXONE SODIUM 2 G: 1 INJECTION, POWDER, FOR SOLUTION INTRAMUSCULAR; INTRAVENOUS at 16:38

## 2019-11-26 NOTE — PROGRESS NOTES
Presents to clinic with complaints of tempeture last night of 102.0. He complains of cough and chills. He stated he needed to leave for [facility]. I encouraged him to stay for work up. He stated he would return before office closes. I spoke with Dr. Payton Reis when Hawa Marks returns obtain CXR, UA,C&s, and blood cultures. Rocephin 2 gm iv for tow days, Levaquin 750 mg po for ten days. I called Hawa Marks and explained this and he stated he would return to office before 4:30.

## 2019-11-27 ENCOUNTER — HOSPITAL ENCOUNTER (OUTPATIENT)
Dept: INFUSION THERAPY | Age: 49
Discharge: HOME | End: 2019-11-27
Payer: COMMERCIAL

## 2019-11-27 ENCOUNTER — HOSPITAL ENCOUNTER (OUTPATIENT)
Dept: RADIATION ONCOLOGY | Facility: HOSPITAL | Age: 49
Discharge: HOME | End: 2019-11-27

## 2019-11-27 DIAGNOSIS — R50.9 FEVER, UNSPECIFIED FEVER CAUSE: Primary | ICD-10-CM

## 2019-11-27 PROCEDURE — 6360000002 HC RX W HCPCS: Performed by: INTERNAL MEDICINE

## 2019-11-27 PROCEDURE — 96365 THER/PROPH/DIAG IV INF INIT: CPT

## 2019-11-27 PROCEDURE — 2580000003 HC RX 258: Performed by: INTERNAL MEDICINE

## 2019-11-27 PROCEDURE — 77386: CPT | Performed by: RADIOLOGY

## 2019-11-27 RX ORDER — EPINEPHRINE 1 MG/ML
0.3 INJECTION, SOLUTION, CONCENTRATE INTRAVENOUS PRN
Status: CANCELLED | OUTPATIENT
Start: 2019-11-28

## 2019-11-27 RX ORDER — DIPHENHYDRAMINE HYDROCHLORIDE 50 MG/ML
50 INJECTION INTRAMUSCULAR; INTRAVENOUS ONCE
Status: CANCELLED | OUTPATIENT
Start: 2019-12-02

## 2019-11-27 RX ORDER — METHYLPREDNISOLONE SODIUM SUCCINATE 125 MG/2ML
125 INJECTION, POWDER, LYOPHILIZED, FOR SOLUTION INTRAMUSCULAR; INTRAVENOUS ONCE
Status: CANCELLED | OUTPATIENT
Start: 2019-11-27

## 2019-11-27 RX ORDER — SODIUM CHLORIDE 9 MG/ML
20 INJECTION, SOLUTION INTRAVENOUS ONCE
Status: CANCELLED | OUTPATIENT
Start: 2019-12-02

## 2019-11-27 RX ORDER — DIPHENHYDRAMINE HYDROCHLORIDE 50 MG/ML
25 INJECTION INTRAMUSCULAR; INTRAVENOUS ONCE
Status: CANCELLED | OUTPATIENT
Start: 2019-12-02

## 2019-11-27 RX ORDER — SODIUM CHLORIDE 0.9 % (FLUSH) 0.9 %
5 SYRINGE (ML) INJECTION PRN
Status: CANCELLED | OUTPATIENT
Start: 2019-11-27

## 2019-11-27 RX ORDER — DIPHENHYDRAMINE HYDROCHLORIDE 50 MG/ML
50 INJECTION INTRAMUSCULAR; INTRAVENOUS ONCE
Status: CANCELLED | OUTPATIENT
Start: 2019-11-27

## 2019-11-27 RX ORDER — HEPARIN SODIUM (PORCINE) LOCK FLUSH IV SOLN 100 UNIT/ML 100 UNIT/ML
500 SOLUTION INTRAVENOUS PRN
Status: CANCELLED | OUTPATIENT
Start: 2019-11-28

## 2019-11-27 RX ORDER — EPINEPHRINE 1 MG/ML
0.3 INJECTION, SOLUTION, CONCENTRATE INTRAVENOUS PRN
Status: CANCELLED | OUTPATIENT
Start: 2019-12-02

## 2019-11-27 RX ORDER — EPINEPHRINE 1 MG/ML
0.3 INJECTION, SOLUTION, CONCENTRATE INTRAVENOUS PRN
Status: CANCELLED | OUTPATIENT
Start: 2019-11-27

## 2019-11-27 RX ORDER — DIPHENHYDRAMINE HYDROCHLORIDE 50 MG/ML
50 INJECTION INTRAMUSCULAR; INTRAVENOUS ONCE
Status: CANCELLED | OUTPATIENT
Start: 2019-11-28

## 2019-11-27 RX ORDER — PALONOSETRON 0.05 MG/ML
0.25 INJECTION, SOLUTION INTRAVENOUS ONCE
Status: CANCELLED | OUTPATIENT
Start: 2019-12-02

## 2019-11-27 RX ORDER — SODIUM CHLORIDE 0.9 % (FLUSH) 0.9 %
10 SYRINGE (ML) INJECTION PRN
Status: CANCELLED | OUTPATIENT
Start: 2019-11-28

## 2019-11-27 RX ORDER — SODIUM CHLORIDE 9 MG/ML
INJECTION, SOLUTION INTRAVENOUS CONTINUOUS
Status: CANCELLED | OUTPATIENT
Start: 2019-12-02

## 2019-11-27 RX ORDER — SODIUM CHLORIDE 0.9 % (FLUSH) 0.9 %
10 SYRINGE (ML) INJECTION PRN
Status: CANCELLED | OUTPATIENT
Start: 2019-12-02

## 2019-11-27 RX ORDER — SODIUM CHLORIDE 0.9 % (FLUSH) 0.9 %
5 SYRINGE (ML) INJECTION PRN
Status: CANCELLED | OUTPATIENT
Start: 2019-11-28

## 2019-11-27 RX ORDER — METHYLPREDNISOLONE SODIUM SUCCINATE 125 MG/2ML
125 INJECTION, POWDER, LYOPHILIZED, FOR SOLUTION INTRAMUSCULAR; INTRAVENOUS ONCE
Status: CANCELLED | OUTPATIENT
Start: 2019-11-28

## 2019-11-27 RX ORDER — SODIUM CHLORIDE 0.9 % (FLUSH) 0.9 %
5 SYRINGE (ML) INJECTION PRN
Status: CANCELLED | OUTPATIENT
Start: 2019-12-02

## 2019-11-27 RX ORDER — HEPARIN SODIUM (PORCINE) LOCK FLUSH IV SOLN 100 UNIT/ML 100 UNIT/ML
500 SOLUTION INTRAVENOUS PRN
Status: CANCELLED | OUTPATIENT
Start: 2019-11-27

## 2019-11-27 RX ORDER — METHYLPREDNISOLONE SODIUM SUCCINATE 125 MG/2ML
125 INJECTION, POWDER, LYOPHILIZED, FOR SOLUTION INTRAMUSCULAR; INTRAVENOUS ONCE
Status: CANCELLED | OUTPATIENT
Start: 2019-12-02

## 2019-11-27 RX ORDER — SODIUM CHLORIDE 9 MG/ML
INJECTION, SOLUTION INTRAVENOUS CONTINUOUS
Status: CANCELLED | OUTPATIENT
Start: 2019-11-27

## 2019-11-27 RX ORDER — SODIUM CHLORIDE 9 MG/ML
INJECTION, SOLUTION INTRAVENOUS CONTINUOUS
Status: CANCELLED | OUTPATIENT
Start: 2019-11-28

## 2019-11-27 RX ORDER — HEPARIN SODIUM (PORCINE) LOCK FLUSH IV SOLN 100 UNIT/ML 100 UNIT/ML
500 SOLUTION INTRAVENOUS PRN
Status: CANCELLED | OUTPATIENT
Start: 2019-12-02

## 2019-11-27 RX ORDER — SODIUM CHLORIDE 0.9 % (FLUSH) 0.9 %
10 SYRINGE (ML) INJECTION PRN
Status: CANCELLED | OUTPATIENT
Start: 2019-11-27

## 2019-11-27 RX ADMIN — CEFTRIAXONE SODIUM 2 G: 1 INJECTION, POWDER, FOR SOLUTION INTRAMUSCULAR; INTRAVENOUS at 16:28

## 2019-12-02 ENCOUNTER — HOSPITAL ENCOUNTER (OUTPATIENT)
Dept: RADIATION ONCOLOGY | Facility: HOSPITAL | Age: 49
Setting detail: RADIATION/ONCOLOGY SERIES
End: 2019-12-02

## 2019-12-02 ENCOUNTER — HOSPITAL ENCOUNTER (OUTPATIENT)
Dept: RADIATION ONCOLOGY | Facility: HOSPITAL | Age: 49
Discharge: HOME | End: 2019-12-02

## 2019-12-02 ENCOUNTER — HOSPITAL ENCOUNTER (OUTPATIENT)
Dept: INFUSION THERAPY | Age: 49
Discharge: HOME | End: 2019-12-02
Payer: COMMERCIAL

## 2019-12-02 VITALS
WEIGHT: 151.7 LBS | TEMPERATURE: 97.5 F | HEART RATE: 83 BPM | DIASTOLIC BLOOD PRESSURE: 63 MMHG | OXYGEN SATURATION: 97 % | SYSTOLIC BLOOD PRESSURE: 108 MMHG | BODY MASS INDEX: 21.77 KG/M2 | RESPIRATION RATE: 14 BRPM

## 2019-12-02 DIAGNOSIS — C15.9 ESOPHAGEAL ADENOCARCINOMA (HCC): Primary | ICD-10-CM

## 2019-12-02 DIAGNOSIS — C15.9 MALIGNANT NEOPLASM OF ESOPHAGUS, UNSPECIFIED LOCATION (HCC): ICD-10-CM

## 2019-12-02 PROCEDURE — 85025 COMPLETE CBC W/AUTO DIFF WBC: CPT

## 2019-12-02 PROCEDURE — 96367 TX/PROPH/DG ADDL SEQ IV INF: CPT

## 2019-12-02 PROCEDURE — 2580000003 HC RX 258: Performed by: INTERNAL MEDICINE

## 2019-12-02 PROCEDURE — 6360000002 HC RX W HCPCS: Performed by: INTERNAL MEDICINE

## 2019-12-02 PROCEDURE — 96417 CHEMO IV INFUS EACH ADDL SEQ: CPT

## 2019-12-02 PROCEDURE — 96374 THER/PROPH/DIAG INJ IV PUSH: CPT

## 2019-12-02 PROCEDURE — 96415 CHEMO IV INFUSION ADDL HR: CPT

## 2019-12-02 PROCEDURE — 2500000003 HC RX 250 WO HCPCS: Performed by: INTERNAL MEDICINE

## 2019-12-02 PROCEDURE — 96413 CHEMO IV INFUSION 1 HR: CPT

## 2019-12-02 PROCEDURE — 36415 COLL VENOUS BLD VENIPUNCTURE: CPT

## 2019-12-02 PROCEDURE — 77386: CPT | Performed by: RADIOLOGY

## 2019-12-02 PROCEDURE — 96375 TX/PRO/DX INJ NEW DRUG ADDON: CPT

## 2019-12-02 PROCEDURE — 96360 HYDRATION IV INFUSION INIT: CPT

## 2019-12-02 PROCEDURE — 80053 COMPREHEN METABOLIC PANEL: CPT

## 2019-12-02 RX ORDER — HEPARIN SODIUM (PORCINE) LOCK FLUSH IV SOLN 100 UNIT/ML 100 UNIT/ML
500 SOLUTION INTRAVENOUS PRN
Status: CANCELLED | OUTPATIENT
Start: 2019-12-02

## 2019-12-02 RX ORDER — SODIUM CHLORIDE 0.9 % (FLUSH) 0.9 %
5 SYRINGE (ML) INJECTION PRN
Status: CANCELLED | OUTPATIENT
Start: 2019-12-02

## 2019-12-02 RX ORDER — 0.9 % SODIUM CHLORIDE 0.9 %
500 INTRAVENOUS SOLUTION INTRAVENOUS PRN
Status: CANCELLED | OUTPATIENT
Start: 2019-12-02

## 2019-12-02 RX ORDER — DIPHENHYDRAMINE HYDROCHLORIDE 50 MG/ML
25 INJECTION INTRAMUSCULAR; INTRAVENOUS ONCE
Status: COMPLETED | OUTPATIENT
Start: 2019-12-02 | End: 2019-12-02

## 2019-12-02 RX ORDER — SODIUM CHLORIDE 0.9 % (FLUSH) 0.9 %
10 SYRINGE (ML) INJECTION PRN
Status: CANCELLED | OUTPATIENT
Start: 2019-12-02

## 2019-12-02 RX ORDER — 0.9 % SODIUM CHLORIDE 0.9 %
500 INTRAVENOUS SOLUTION INTRAVENOUS PRN
Status: DISCONTINUED | OUTPATIENT
Start: 2019-12-02 | End: 2019-12-04 | Stop reason: HOSPADM

## 2019-12-02 RX ORDER — DEXAMETHASONE SODIUM PHOSPHATE 10 MG/ML
10 INJECTION, SOLUTION INTRAMUSCULAR; INTRAVENOUS ONCE
Status: COMPLETED | OUTPATIENT
Start: 2019-12-02 | End: 2019-12-02

## 2019-12-02 RX ORDER — PALONOSETRON 0.05 MG/ML
0.25 INJECTION, SOLUTION INTRAVENOUS ONCE
Status: COMPLETED | OUTPATIENT
Start: 2019-12-02 | End: 2019-12-02

## 2019-12-02 RX ADMIN — DEXAMETHASONE SODIUM PHOSPHATE 10 MG: 10 INJECTION, SOLUTION INTRAMUSCULAR; INTRAVENOUS at 13:22

## 2019-12-02 RX ADMIN — DIPHENHYDRAMINE HYDROCHLORIDE 25 MG: 50 INJECTION, SOLUTION INTRAMUSCULAR; INTRAVENOUS at 13:21

## 2019-12-02 RX ADMIN — CARBOPLATIN 222 MG: 10 INJECTION, SOLUTION INTRAVENOUS at 14:52

## 2019-12-02 RX ADMIN — PACLITAXEL 90 MG: 6 INJECTION, SOLUTION INTRAVENOUS at 13:50

## 2019-12-02 RX ADMIN — FAMOTIDINE 20 MG: 10 INJECTION, SOLUTION INTRAVENOUS at 13:22

## 2019-12-02 RX ADMIN — PALONOSETRON 0.25 MG: 0.05 INJECTION, SOLUTION INTRAVENOUS at 13:21

## 2019-12-02 RX ADMIN — SODIUM CHLORIDE 500 ML: 9 INJECTION, SOLUTION INTRAVENOUS at 12:50

## 2019-12-02 ASSESSMENT — PAIN SCALES - GENERAL: PAINLEVEL_OUTOF10: 0

## 2019-12-02 NOTE — PROGRESS NOTES
Patient presents for tx as scheduled. Reports completed 12 of 28 scheduled xrt with #13 due today. 500ml NS today. Taking oral antbx as prescribed. Denies any fever/chills or associated symptoms.

## 2019-12-04 ENCOUNTER — HOSPITAL ENCOUNTER (OUTPATIENT)
Dept: RADIATION ONCOLOGY | Facility: HOSPITAL | Age: 49
Discharge: HOME | End: 2019-12-04

## 2019-12-04 ENCOUNTER — TELEPHONE (OUTPATIENT)
Dept: RADIATION ONCOLOGY | Facility: HOSPITAL | Age: 49
End: 2019-12-04

## 2019-12-04 PROCEDURE — 77386: CPT | Performed by: RADIOLOGY

## 2019-12-04 PROCEDURE — 77336 RADIATION PHYSICS CONSULT: CPT | Performed by: RADIOLOGY

## 2019-12-05 ENCOUNTER — HOSPITAL ENCOUNTER (OUTPATIENT)
Dept: RADIATION ONCOLOGY | Facility: HOSPITAL | Age: 49
Discharge: HOME | End: 2019-12-05

## 2019-12-05 LAB
CYTO UR: NORMAL
LAB AP CASE REPORT: NORMAL
LAB AP CLINICAL INFORMATION: NORMAL
LAB AP INTRADEPARTMENTAL CONSULT: NORMAL
PATH REPORT.FINAL DX SPEC: NORMAL
PATH REPORT.GROSS SPEC: NORMAL

## 2019-12-05 PROCEDURE — 77386: CPT | Performed by: RADIOLOGY

## 2019-12-06 ENCOUNTER — HOSPITAL ENCOUNTER (OUTPATIENT)
Dept: RADIATION ONCOLOGY | Facility: HOSPITAL | Age: 49
Discharge: HOME | End: 2019-12-06

## 2019-12-06 PROCEDURE — 77386: CPT | Performed by: RADIOLOGY

## 2019-12-09 ENCOUNTER — HOSPITAL ENCOUNTER (OUTPATIENT)
Dept: INFUSION THERAPY | Age: 49
Discharge: HOME | End: 2019-12-09
Payer: COMMERCIAL

## 2019-12-09 ENCOUNTER — HOSPITAL ENCOUNTER (OUTPATIENT)
Dept: RADIATION ONCOLOGY | Facility: HOSPITAL | Age: 49
Setting detail: RADIATION/ONCOLOGY SERIES
Discharge: HOME | End: 2019-12-09

## 2019-12-09 ENCOUNTER — OFFICE VISIT (OUTPATIENT)
Dept: HEMATOLOGY | Age: 49
End: 2019-12-09
Payer: COMMERCIAL

## 2019-12-09 VITALS
BODY MASS INDEX: 21.06 KG/M2 | WEIGHT: 147.1 LBS | DIASTOLIC BLOOD PRESSURE: 76 MMHG | HEIGHT: 70 IN | OXYGEN SATURATION: 96 % | SYSTOLIC BLOOD PRESSURE: 120 MMHG | HEART RATE: 103 BPM

## 2019-12-09 DIAGNOSIS — C15.9 ESOPHAGEAL ADENOCARCINOMA (HCC): ICD-10-CM

## 2019-12-09 DIAGNOSIS — C15.9 ESOPHAGEAL ADENOCARCINOMA (HCC): Primary | ICD-10-CM

## 2019-12-09 DIAGNOSIS — C15.9 MALIGNANT NEOPLASM OF ESOPHAGUS, UNSPECIFIED LOCATION (HCC): Primary | ICD-10-CM

## 2019-12-09 PROCEDURE — 2580000003 HC RX 258: Performed by: INTERNAL MEDICINE

## 2019-12-09 PROCEDURE — 2500000003 HC RX 250 WO HCPCS: Performed by: INTERNAL MEDICINE

## 2019-12-09 PROCEDURE — 96415 CHEMO IV INFUSION ADDL HR: CPT

## 2019-12-09 PROCEDURE — 96417 CHEMO IV INFUS EACH ADDL SEQ: CPT

## 2019-12-09 PROCEDURE — 85025 COMPLETE CBC W/AUTO DIFF WBC: CPT

## 2019-12-09 PROCEDURE — 6360000002 HC RX W HCPCS: Performed by: INTERNAL MEDICINE

## 2019-12-09 PROCEDURE — 96413 CHEMO IV INFUSION 1 HR: CPT

## 2019-12-09 PROCEDURE — 77386: CPT | Performed by: RADIOLOGY

## 2019-12-09 PROCEDURE — 99214 OFFICE O/P EST MOD 30 MIN: CPT | Performed by: INTERNAL MEDICINE

## 2019-12-09 PROCEDURE — 96375 TX/PRO/DX INJ NEW DRUG ADDON: CPT

## 2019-12-09 RX ORDER — PALONOSETRON 0.05 MG/ML
0.25 INJECTION, SOLUTION INTRAVENOUS ONCE
Status: COMPLETED | OUTPATIENT
Start: 2019-12-09 | End: 2019-12-09

## 2019-12-09 RX ORDER — DIPHENHYDRAMINE HYDROCHLORIDE 50 MG/ML
25 INJECTION INTRAMUSCULAR; INTRAVENOUS ONCE
Status: CANCELLED | OUTPATIENT
Start: 2019-12-09

## 2019-12-09 RX ORDER — DEXAMETHASONE SODIUM PHOSPHATE 10 MG/ML
10 INJECTION, SOLUTION INTRAMUSCULAR; INTRAVENOUS ONCE
Status: COMPLETED | OUTPATIENT
Start: 2019-12-09 | End: 2019-12-09

## 2019-12-09 RX ORDER — SODIUM CHLORIDE 0.9 % (FLUSH) 0.9 %
5 SYRINGE (ML) INJECTION PRN
Status: CANCELLED | OUTPATIENT
Start: 2019-12-09

## 2019-12-09 RX ORDER — DIPHENHYDRAMINE HYDROCHLORIDE 50 MG/ML
25 INJECTION INTRAMUSCULAR; INTRAVENOUS ONCE
Status: COMPLETED | OUTPATIENT
Start: 2019-12-09 | End: 2019-12-09

## 2019-12-09 RX ORDER — EPINEPHRINE 1 MG/ML
0.3 INJECTION, SOLUTION, CONCENTRATE INTRAVENOUS PRN
Status: CANCELLED | OUTPATIENT
Start: 2019-12-09

## 2019-12-09 RX ORDER — DIPHENHYDRAMINE HYDROCHLORIDE 50 MG/ML
50 INJECTION INTRAMUSCULAR; INTRAVENOUS ONCE
Status: CANCELLED | OUTPATIENT
Start: 2019-12-09

## 2019-12-09 RX ORDER — HEPARIN SODIUM (PORCINE) LOCK FLUSH IV SOLN 100 UNIT/ML 100 UNIT/ML
500 SOLUTION INTRAVENOUS PRN
Status: CANCELLED | OUTPATIENT
Start: 2019-12-09

## 2019-12-09 RX ORDER — SODIUM CHLORIDE 9 MG/ML
INJECTION, SOLUTION INTRAVENOUS CONTINUOUS
Status: CANCELLED | OUTPATIENT
Start: 2019-12-09

## 2019-12-09 RX ORDER — METHYLPREDNISOLONE SODIUM SUCCINATE 125 MG/2ML
125 INJECTION, POWDER, LYOPHILIZED, FOR SOLUTION INTRAMUSCULAR; INTRAVENOUS ONCE
Status: CANCELLED | OUTPATIENT
Start: 2019-12-09

## 2019-12-09 RX ORDER — PALONOSETRON 0.05 MG/ML
0.25 INJECTION, SOLUTION INTRAVENOUS ONCE
Status: CANCELLED | OUTPATIENT
Start: 2019-12-09

## 2019-12-09 RX ORDER — SODIUM CHLORIDE 0.9 % (FLUSH) 0.9 %
10 SYRINGE (ML) INJECTION PRN
Status: CANCELLED | OUTPATIENT
Start: 2019-12-09

## 2019-12-09 RX ORDER — SODIUM CHLORIDE 9 MG/ML
20 INJECTION, SOLUTION INTRAVENOUS ONCE
Status: CANCELLED | OUTPATIENT
Start: 2019-12-09

## 2019-12-09 RX ADMIN — CARBOPLATIN 222 MG: 10 INJECTION, SOLUTION INTRAVENOUS at 15:10

## 2019-12-09 RX ADMIN — PALONOSETRON 0.25 MG: 0.05 INJECTION, SOLUTION INTRAVENOUS at 13:35

## 2019-12-09 RX ADMIN — PACLITAXEL 90 MG: 6 INJECTION, SOLUTION INTRAVENOUS at 14:07

## 2019-12-09 RX ADMIN — DIPHENHYDRAMINE HYDROCHLORIDE 25 MG: 50 INJECTION, SOLUTION INTRAMUSCULAR; INTRAVENOUS at 13:35

## 2019-12-09 RX ADMIN — FAMOTIDINE 20 MG: 10 INJECTION, SOLUTION INTRAVENOUS at 13:35

## 2019-12-09 RX ADMIN — DEXAMETHASONE SODIUM PHOSPHATE 10 MG: 10 INJECTION, SOLUTION INTRAMUSCULAR; INTRAVENOUS at 13:35

## 2019-12-10 ENCOUNTER — HOSPITAL ENCOUNTER (OUTPATIENT)
Dept: RADIATION ONCOLOGY | Facility: HOSPITAL | Age: 49
Setting detail: RADIATION/ONCOLOGY SERIES
Discharge: HOME | End: 2019-12-10

## 2019-12-10 PROCEDURE — 77386: CPT | Performed by: RADIOLOGY

## 2019-12-11 ENCOUNTER — HOSPITAL ENCOUNTER (OUTPATIENT)
Dept: RADIATION ONCOLOGY | Facility: HOSPITAL | Age: 49
Setting detail: RADIATION/ONCOLOGY SERIES
Discharge: HOME | End: 2019-12-11

## 2019-12-11 PROCEDURE — 77386: CPT | Performed by: RADIOLOGY

## 2019-12-11 PROCEDURE — 77336 RADIATION PHYSICS CONSULT: CPT | Performed by: RADIOLOGY

## 2019-12-12 ENCOUNTER — HOSPITAL ENCOUNTER (OUTPATIENT)
Dept: RADIATION ONCOLOGY | Facility: HOSPITAL | Age: 49
Setting detail: RADIATION/ONCOLOGY SERIES
Discharge: HOME | End: 2019-12-12

## 2019-12-12 PROCEDURE — 77386: CPT | Performed by: RADIOLOGY

## 2019-12-13 ENCOUNTER — HOSPITAL ENCOUNTER (OUTPATIENT)
Dept: RADIATION ONCOLOGY | Facility: HOSPITAL | Age: 49
Setting detail: RADIATION/ONCOLOGY SERIES
Discharge: HOME | End: 2019-12-13

## 2019-12-13 PROCEDURE — 77386: CPT | Performed by: RADIOLOGY

## 2019-12-16 ENCOUNTER — HOSPITAL ENCOUNTER (OUTPATIENT)
Dept: INFUSION THERAPY | Age: 49
Discharge: HOME | End: 2019-12-16
Payer: COMMERCIAL

## 2019-12-16 ENCOUNTER — HOSPITAL ENCOUNTER (OUTPATIENT)
Dept: RADIATION ONCOLOGY | Facility: HOSPITAL | Age: 49
Setting detail: RADIATION/ONCOLOGY SERIES
Discharge: HOME | End: 2019-12-16

## 2019-12-16 VITALS
SYSTOLIC BLOOD PRESSURE: 110 MMHG | RESPIRATION RATE: 16 BRPM | BODY MASS INDEX: 20.9 KG/M2 | HEIGHT: 70 IN | TEMPERATURE: 97.6 F | DIASTOLIC BLOOD PRESSURE: 64 MMHG | HEART RATE: 90 BPM | OXYGEN SATURATION: 97 % | WEIGHT: 146 LBS

## 2019-12-16 DIAGNOSIS — C15.9 ESOPHAGEAL ADENOCARCINOMA (HCC): Primary | ICD-10-CM

## 2019-12-16 DIAGNOSIS — R50.9 FEVER, UNSPECIFIED FEVER CAUSE: ICD-10-CM

## 2019-12-16 DIAGNOSIS — C15.9 ESOPHAGEAL ADENOCARCINOMA (HCC): ICD-10-CM

## 2019-12-16 DIAGNOSIS — C15.9 MALIGNANT NEOPLASM OF ESOPHAGUS, UNSPECIFIED LOCATION (HCC): Primary | ICD-10-CM

## 2019-12-16 PROCEDURE — 6360000002 HC RX W HCPCS: Performed by: INTERNAL MEDICINE

## 2019-12-16 PROCEDURE — 2500000003 HC RX 250 WO HCPCS: Performed by: INTERNAL MEDICINE

## 2019-12-16 PROCEDURE — 96417 CHEMO IV INFUS EACH ADDL SEQ: CPT

## 2019-12-16 PROCEDURE — 77386: CPT | Performed by: RADIOLOGY

## 2019-12-16 PROCEDURE — 96367 TX/PROPH/DG ADDL SEQ IV INF: CPT

## 2019-12-16 PROCEDURE — 96365 THER/PROPH/DIAG IV INF INIT: CPT

## 2019-12-16 PROCEDURE — 96375 TX/PRO/DX INJ NEW DRUG ADDON: CPT

## 2019-12-16 PROCEDURE — 85025 COMPLETE CBC W/AUTO DIFF WBC: CPT

## 2019-12-16 PROCEDURE — 2580000003 HC RX 258: Performed by: INTERNAL MEDICINE

## 2019-12-16 PROCEDURE — 96413 CHEMO IV INFUSION 1 HR: CPT

## 2019-12-16 PROCEDURE — 96374 THER/PROPH/DIAG INJ IV PUSH: CPT

## 2019-12-16 RX ORDER — SODIUM CHLORIDE 0.9 % (FLUSH) 0.9 %
10 SYRINGE (ML) INJECTION PRN
Status: CANCELLED | OUTPATIENT
Start: 2019-12-17

## 2019-12-16 RX ORDER — METHYLPREDNISOLONE SODIUM SUCCINATE 125 MG/2ML
125 INJECTION, POWDER, LYOPHILIZED, FOR SOLUTION INTRAMUSCULAR; INTRAVENOUS ONCE
Status: CANCELLED | OUTPATIENT
Start: 2019-12-16

## 2019-12-16 RX ORDER — PALONOSETRON 0.05 MG/ML
0.25 INJECTION, SOLUTION INTRAVENOUS ONCE
Status: CANCELLED | OUTPATIENT
Start: 2019-12-16

## 2019-12-16 RX ORDER — SODIUM CHLORIDE 0.9 % (FLUSH) 0.9 %
10 SYRINGE (ML) INJECTION PRN
Status: CANCELLED | OUTPATIENT
Start: 2019-12-16

## 2019-12-16 RX ORDER — DIPHENHYDRAMINE HYDROCHLORIDE 50 MG/ML
50 INJECTION INTRAMUSCULAR; INTRAVENOUS ONCE
Status: CANCELLED | OUTPATIENT
Start: 2019-12-17

## 2019-12-16 RX ORDER — HEPARIN SODIUM (PORCINE) LOCK FLUSH IV SOLN 100 UNIT/ML 100 UNIT/ML
500 SOLUTION INTRAVENOUS PRN
Status: CANCELLED | OUTPATIENT
Start: 2019-12-17

## 2019-12-16 RX ORDER — SODIUM CHLORIDE 0.9 % (FLUSH) 0.9 %
5 SYRINGE (ML) INJECTION PRN
Status: CANCELLED | OUTPATIENT
Start: 2019-12-16

## 2019-12-16 RX ORDER — SODIUM CHLORIDE 9 MG/ML
20 INJECTION, SOLUTION INTRAVENOUS ONCE
Status: DISCONTINUED | OUTPATIENT
Start: 2019-12-16 | End: 2019-12-18 | Stop reason: HOSPADM

## 2019-12-16 RX ORDER — SODIUM CHLORIDE 9 MG/ML
INJECTION, SOLUTION INTRAVENOUS CONTINUOUS
Status: CANCELLED | OUTPATIENT
Start: 2019-12-16

## 2019-12-16 RX ORDER — DIPHENHYDRAMINE HYDROCHLORIDE 50 MG/ML
25 INJECTION INTRAMUSCULAR; INTRAVENOUS ONCE
Status: COMPLETED | OUTPATIENT
Start: 2019-12-16 | End: 2019-12-16

## 2019-12-16 RX ORDER — EPINEPHRINE 1 MG/ML
0.3 INJECTION, SOLUTION, CONCENTRATE INTRAVENOUS PRN
Status: CANCELLED | OUTPATIENT
Start: 2019-12-16

## 2019-12-16 RX ORDER — SODIUM CHLORIDE 0.9 % (FLUSH) 0.9 %
10 SYRINGE (ML) INJECTION PRN
Status: DISCONTINUED | OUTPATIENT
Start: 2019-12-16 | End: 2019-12-17 | Stop reason: HOSPADM

## 2019-12-16 RX ORDER — METHYLPREDNISOLONE SODIUM SUCCINATE 125 MG/2ML
125 INJECTION, POWDER, LYOPHILIZED, FOR SOLUTION INTRAMUSCULAR; INTRAVENOUS ONCE
Status: CANCELLED | OUTPATIENT
Start: 2019-12-17

## 2019-12-16 RX ORDER — PALONOSETRON 0.05 MG/ML
0.25 INJECTION, SOLUTION INTRAVENOUS ONCE
Status: COMPLETED | OUTPATIENT
Start: 2019-12-16 | End: 2019-12-16

## 2019-12-16 RX ORDER — DEXAMETHASONE SODIUM PHOSPHATE 10 MG/ML
10 INJECTION, SOLUTION INTRAMUSCULAR; INTRAVENOUS ONCE
Status: COMPLETED | OUTPATIENT
Start: 2019-12-16 | End: 2019-12-16

## 2019-12-16 RX ORDER — EPINEPHRINE 1 MG/ML
0.3 INJECTION, SOLUTION, CONCENTRATE INTRAVENOUS PRN
Status: CANCELLED | OUTPATIENT
Start: 2019-12-17

## 2019-12-16 RX ORDER — DIPHENHYDRAMINE HYDROCHLORIDE 50 MG/ML
50 INJECTION INTRAMUSCULAR; INTRAVENOUS ONCE
Status: CANCELLED | OUTPATIENT
Start: 2019-12-16

## 2019-12-16 RX ORDER — SODIUM CHLORIDE 9 MG/ML
20 INJECTION, SOLUTION INTRAVENOUS ONCE
Status: CANCELLED | OUTPATIENT
Start: 2019-12-16

## 2019-12-16 RX ORDER — DIPHENHYDRAMINE HYDROCHLORIDE 50 MG/ML
25 INJECTION INTRAMUSCULAR; INTRAVENOUS ONCE
Status: CANCELLED | OUTPATIENT
Start: 2019-12-16

## 2019-12-16 RX ORDER — SODIUM CHLORIDE 0.9 % (FLUSH) 0.9 %
5 SYRINGE (ML) INJECTION PRN
Status: CANCELLED | OUTPATIENT
Start: 2019-12-17

## 2019-12-16 RX ORDER — SODIUM CHLORIDE 9 MG/ML
INJECTION, SOLUTION INTRAVENOUS CONTINUOUS
Status: CANCELLED | OUTPATIENT
Start: 2019-12-17

## 2019-12-16 RX ORDER — HEPARIN SODIUM (PORCINE) LOCK FLUSH IV SOLN 100 UNIT/ML 100 UNIT/ML
300 SOLUTION INTRAVENOUS PRN
Status: DISCONTINUED | OUTPATIENT
Start: 2019-12-16 | End: 2019-12-17 | Stop reason: HOSPADM

## 2019-12-16 RX ORDER — HEPARIN SODIUM (PORCINE) LOCK FLUSH IV SOLN 100 UNIT/ML 100 UNIT/ML
300 SOLUTION INTRAVENOUS PRN
Status: CANCELLED | OUTPATIENT
Start: 2019-12-16

## 2019-12-16 RX ADMIN — DIPHENHYDRAMINE HYDROCHLORIDE 25 MG: 50 INJECTION, SOLUTION INTRAMUSCULAR; INTRAVENOUS at 13:55

## 2019-12-16 RX ADMIN — PALONOSETRON HYDROCHLORIDE 0.25 MG: 0.25 INJECTION, SOLUTION INTRAVENOUS at 13:55

## 2019-12-16 RX ADMIN — FAMOTIDINE 20 MG: 10 INJECTION, SOLUTION INTRAVENOUS at 13:55

## 2019-12-16 RX ADMIN — PACLITAXEL 90 MG: 6 INJECTION, SOLUTION INTRAVENOUS at 14:25

## 2019-12-16 RX ADMIN — DEXAMETHASONE SODIUM PHOSPHATE 10 MG: 10 INJECTION, SOLUTION INTRAMUSCULAR; INTRAVENOUS at 13:55

## 2019-12-16 RX ADMIN — DEXTROSE MONOHYDRATE 2 G: 50 INJECTION, SOLUTION INTRAVENOUS at 16:37

## 2019-12-16 ASSESSMENT — PAIN SCALES - GENERAL: PAINLEVEL_OUTOF10: 0

## 2019-12-16 NOTE — PROGRESS NOTES
Pts labs are marginal, discussed with Dr. Hager Section. We will continue will treatment as planned and give IV Rocephin this week daily as he is having some chest congestion and malaise.

## 2019-12-17 ENCOUNTER — HOSPITAL ENCOUNTER (OUTPATIENT)
Dept: RADIATION ONCOLOGY | Facility: HOSPITAL | Age: 49
Setting detail: RADIATION/ONCOLOGY SERIES
Discharge: HOME | End: 2019-12-17

## 2019-12-17 PROCEDURE — 77386: CPT | Performed by: RADIOLOGY

## 2019-12-18 ENCOUNTER — HOSPITAL ENCOUNTER (OUTPATIENT)
Dept: RADIATION ONCOLOGY | Facility: HOSPITAL | Age: 49
Setting detail: RADIATION/ONCOLOGY SERIES
Discharge: HOME | End: 2019-12-18

## 2019-12-18 ENCOUNTER — HOSPITAL ENCOUNTER (OUTPATIENT)
Dept: INFUSION THERAPY | Age: 49
Discharge: HOME | End: 2019-12-18
Payer: COMMERCIAL

## 2019-12-18 DIAGNOSIS — R50.9 FEVER, UNSPECIFIED FEVER CAUSE: Primary | ICD-10-CM

## 2019-12-18 PROCEDURE — 6360000002 HC RX W HCPCS: Performed by: INTERNAL MEDICINE

## 2019-12-18 PROCEDURE — 96365 THER/PROPH/DIAG IV INF INIT: CPT

## 2019-12-18 PROCEDURE — 77336 RADIATION PHYSICS CONSULT: CPT | Performed by: RADIOLOGY

## 2019-12-18 PROCEDURE — 2580000003 HC RX 258: Performed by: INTERNAL MEDICINE

## 2019-12-18 PROCEDURE — 77386: CPT | Performed by: RADIOLOGY

## 2019-12-18 RX ORDER — EPINEPHRINE 1 MG/ML
0.3 INJECTION, SOLUTION, CONCENTRATE INTRAVENOUS PRN
Status: CANCELLED | OUTPATIENT
Start: 2019-12-19

## 2019-12-18 RX ORDER — HEPARIN SODIUM (PORCINE) LOCK FLUSH IV SOLN 100 UNIT/ML 100 UNIT/ML
500 SOLUTION INTRAVENOUS PRN
Status: CANCELLED | OUTPATIENT
Start: 2019-12-19

## 2019-12-18 RX ORDER — SODIUM CHLORIDE 0.9 % (FLUSH) 0.9 %
5 SYRINGE (ML) INJECTION PRN
Status: CANCELLED | OUTPATIENT
Start: 2019-12-19

## 2019-12-18 RX ORDER — DIPHENHYDRAMINE HYDROCHLORIDE 50 MG/ML
50 INJECTION INTRAMUSCULAR; INTRAVENOUS ONCE
Status: CANCELLED | OUTPATIENT
Start: 2019-12-19

## 2019-12-18 RX ORDER — SODIUM CHLORIDE 9 MG/ML
INJECTION, SOLUTION INTRAVENOUS CONTINUOUS
Status: CANCELLED | OUTPATIENT
Start: 2019-12-19

## 2019-12-18 RX ORDER — SODIUM CHLORIDE 0.9 % (FLUSH) 0.9 %
10 SYRINGE (ML) INJECTION PRN
Status: CANCELLED | OUTPATIENT
Start: 2019-12-19

## 2019-12-18 RX ORDER — METHYLPREDNISOLONE SODIUM SUCCINATE 125 MG/2ML
125 INJECTION, POWDER, LYOPHILIZED, FOR SOLUTION INTRAMUSCULAR; INTRAVENOUS ONCE
Status: CANCELLED | OUTPATIENT
Start: 2019-12-19

## 2019-12-18 RX ADMIN — CEFTRIAXONE SODIUM 2 G: 1 INJECTION, POWDER, FOR SOLUTION INTRAMUSCULAR; INTRAVENOUS at 13:15

## 2019-12-19 ENCOUNTER — HOSPITAL ENCOUNTER (OUTPATIENT)
Dept: RADIATION ONCOLOGY | Facility: HOSPITAL | Age: 49
Setting detail: RADIATION/ONCOLOGY SERIES
Discharge: HOME | End: 2019-12-19

## 2019-12-19 PROCEDURE — 77386: CPT | Performed by: RADIOLOGY

## 2019-12-20 ENCOUNTER — HOSPITAL ENCOUNTER (OUTPATIENT)
Dept: RADIATION ONCOLOGY | Facility: HOSPITAL | Age: 49
Setting detail: RADIATION/ONCOLOGY SERIES
Discharge: HOME | End: 2019-12-20

## 2019-12-20 ENCOUNTER — HOSPITAL ENCOUNTER (OUTPATIENT)
Dept: INFUSION THERAPY | Age: 49
Discharge: HOME | End: 2019-12-20

## 2019-12-20 VITALS
SYSTOLIC BLOOD PRESSURE: 92 MMHG | OXYGEN SATURATION: 98 % | HEART RATE: 96 BPM | BODY MASS INDEX: 20.95 KG/M2 | TEMPERATURE: 98.6 F | HEIGHT: 70 IN | DIASTOLIC BLOOD PRESSURE: 60 MMHG

## 2019-12-20 DIAGNOSIS — R50.9 FEVER, UNSPECIFIED FEVER CAUSE: Primary | ICD-10-CM

## 2019-12-20 DIAGNOSIS — C15.9 ESOPHAGEAL ADENOCARCINOMA (HCC): ICD-10-CM

## 2019-12-20 PROCEDURE — 96368 THER/DIAG CONCURRENT INF: CPT

## 2019-12-20 PROCEDURE — 77386: CPT | Performed by: RADIOLOGY

## 2019-12-20 PROCEDURE — 96365 THER/PROPH/DIAG IV INF INIT: CPT

## 2019-12-20 PROCEDURE — 6360000002 HC RX W HCPCS: Performed by: INTERNAL MEDICINE

## 2019-12-20 PROCEDURE — 2580000003 HC RX 258: Performed by: INTERNAL MEDICINE

## 2019-12-20 RX ORDER — METHYLPREDNISOLONE SODIUM SUCCINATE 125 MG/2ML
125 INJECTION, POWDER, LYOPHILIZED, FOR SOLUTION INTRAMUSCULAR; INTRAVENOUS ONCE
Status: CANCELLED | OUTPATIENT
Start: 2019-12-21

## 2019-12-20 RX ORDER — SODIUM CHLORIDE 9 MG/ML
INJECTION, SOLUTION INTRAVENOUS CONTINUOUS
Status: CANCELLED | OUTPATIENT
Start: 2019-12-21

## 2019-12-20 RX ORDER — EPINEPHRINE 1 MG/ML
0.3 INJECTION, SOLUTION, CONCENTRATE INTRAVENOUS PRN
Status: CANCELLED | OUTPATIENT
Start: 2019-12-21

## 2019-12-20 RX ORDER — HEPARIN SODIUM (PORCINE) LOCK FLUSH IV SOLN 100 UNIT/ML 100 UNIT/ML
500 SOLUTION INTRAVENOUS PRN
Status: CANCELLED | OUTPATIENT
Start: 2019-12-21

## 2019-12-20 RX ORDER — SODIUM CHLORIDE 0.9 % (FLUSH) 0.9 %
5 SYRINGE (ML) INJECTION PRN
Status: CANCELLED | OUTPATIENT
Start: 2019-12-21

## 2019-12-20 RX ORDER — DIPHENHYDRAMINE HYDROCHLORIDE 50 MG/ML
50 INJECTION INTRAMUSCULAR; INTRAVENOUS ONCE
Status: CANCELLED | OUTPATIENT
Start: 2019-12-21

## 2019-12-20 RX ORDER — SODIUM CHLORIDE 0.9 % (FLUSH) 0.9 %
10 SYRINGE (ML) INJECTION PRN
Status: CANCELLED | OUTPATIENT
Start: 2019-12-21

## 2019-12-20 RX ADMIN — DEXTROSE MONOHYDRATE 2 G: 50 INJECTION, SOLUTION INTRAVENOUS at 12:49

## 2019-12-23 ENCOUNTER — HOSPITAL ENCOUNTER (OUTPATIENT)
Dept: INFUSION THERAPY | Age: 49
Discharge: HOME | End: 2019-12-23
Payer: OTHER GOVERNMENT

## 2019-12-23 ENCOUNTER — HOSPITAL ENCOUNTER (OUTPATIENT)
Dept: RADIATION ONCOLOGY | Facility: HOSPITAL | Age: 49
Setting detail: RADIATION/ONCOLOGY SERIES
Discharge: HOME | End: 2019-12-23

## 2019-12-23 VITALS
SYSTOLIC BLOOD PRESSURE: 110 MMHG | BODY MASS INDEX: 20.9 KG/M2 | TEMPERATURE: 98.1 F | HEIGHT: 70 IN | DIASTOLIC BLOOD PRESSURE: 64 MMHG | RESPIRATION RATE: 16 BRPM | WEIGHT: 146 LBS | HEART RATE: 73 BPM | OXYGEN SATURATION: 94 %

## 2019-12-23 DIAGNOSIS — C15.9 MALIGNANT NEOPLASM OF ESOPHAGUS, UNSPECIFIED LOCATION (HCC): Primary | ICD-10-CM

## 2019-12-23 DIAGNOSIS — C15.9 ESOPHAGEAL ADENOCARCINOMA (HCC): ICD-10-CM

## 2019-12-23 PROCEDURE — 6360000002 HC RX W HCPCS: Performed by: INTERNAL MEDICINE

## 2019-12-23 PROCEDURE — 2580000003 HC RX 258: Performed by: INTERNAL MEDICINE

## 2019-12-23 PROCEDURE — 85025 COMPLETE CBC W/AUTO DIFF WBC: CPT

## 2019-12-23 PROCEDURE — 96417 CHEMO IV INFUS EACH ADDL SEQ: CPT

## 2019-12-23 PROCEDURE — 77386: CPT | Performed by: RADIOLOGY

## 2019-12-23 PROCEDURE — 2500000003 HC RX 250 WO HCPCS: Performed by: INTERNAL MEDICINE

## 2019-12-23 PROCEDURE — 96413 CHEMO IV INFUSION 1 HR: CPT

## 2019-12-23 PROCEDURE — 96375 TX/PRO/DX INJ NEW DRUG ADDON: CPT

## 2019-12-23 RX ORDER — DEXAMETHASONE SODIUM PHOSPHATE 10 MG/ML
10 INJECTION, SOLUTION INTRAMUSCULAR; INTRAVENOUS ONCE
Status: COMPLETED | OUTPATIENT
Start: 2019-12-23 | End: 2019-12-23

## 2019-12-23 RX ORDER — DIPHENHYDRAMINE HYDROCHLORIDE 50 MG/ML
25 INJECTION INTRAMUSCULAR; INTRAVENOUS ONCE
Status: CANCELLED | OUTPATIENT
Start: 2019-12-23

## 2019-12-23 RX ORDER — DIPHENHYDRAMINE HYDROCHLORIDE 50 MG/ML
25 INJECTION INTRAMUSCULAR; INTRAVENOUS ONCE
Status: COMPLETED | OUTPATIENT
Start: 2019-12-23 | End: 2019-12-23

## 2019-12-23 RX ORDER — SODIUM CHLORIDE 0.9 % (FLUSH) 0.9 %
10 SYRINGE (ML) INJECTION PRN
Status: DISCONTINUED | OUTPATIENT
Start: 2019-12-23 | End: 2019-12-24 | Stop reason: HOSPADM

## 2019-12-23 RX ORDER — METHYLPREDNISOLONE SODIUM SUCCINATE 125 MG/2ML
125 INJECTION, POWDER, LYOPHILIZED, FOR SOLUTION INTRAMUSCULAR; INTRAVENOUS ONCE
Status: CANCELLED | OUTPATIENT
Start: 2019-12-23

## 2019-12-23 RX ORDER — SODIUM CHLORIDE 9 MG/ML
INJECTION, SOLUTION INTRAVENOUS CONTINUOUS
Status: CANCELLED | OUTPATIENT
Start: 2019-12-23

## 2019-12-23 RX ORDER — SODIUM CHLORIDE 0.9 % (FLUSH) 0.9 %
5 SYRINGE (ML) INJECTION PRN
Status: CANCELLED | OUTPATIENT
Start: 2019-12-23

## 2019-12-23 RX ORDER — PALONOSETRON 0.05 MG/ML
0.25 INJECTION, SOLUTION INTRAVENOUS ONCE
Status: CANCELLED | OUTPATIENT
Start: 2019-12-23

## 2019-12-23 RX ORDER — EPINEPHRINE 1 MG/ML
0.3 INJECTION, SOLUTION, CONCENTRATE INTRAVENOUS PRN
Status: CANCELLED | OUTPATIENT
Start: 2019-12-23

## 2019-12-23 RX ORDER — PALONOSETRON 0.05 MG/ML
0.25 INJECTION, SOLUTION INTRAVENOUS ONCE
Status: COMPLETED | OUTPATIENT
Start: 2019-12-23 | End: 2019-12-23

## 2019-12-23 RX ORDER — DIPHENHYDRAMINE HYDROCHLORIDE 50 MG/ML
50 INJECTION INTRAMUSCULAR; INTRAVENOUS ONCE
Status: CANCELLED | OUTPATIENT
Start: 2019-12-23

## 2019-12-23 RX ORDER — SODIUM CHLORIDE 0.9 % (FLUSH) 0.9 %
10 SYRINGE (ML) INJECTION PRN
Status: CANCELLED | OUTPATIENT
Start: 2019-12-23

## 2019-12-23 RX ORDER — SODIUM CHLORIDE 9 MG/ML
20 INJECTION, SOLUTION INTRAVENOUS ONCE
Status: CANCELLED | OUTPATIENT
Start: 2019-12-23

## 2019-12-23 RX ORDER — HEPARIN SODIUM (PORCINE) LOCK FLUSH IV SOLN 100 UNIT/ML 100 UNIT/ML
300 SOLUTION INTRAVENOUS PRN
Status: CANCELLED | OUTPATIENT
Start: 2019-12-23

## 2019-12-23 RX ORDER — HEPARIN SODIUM (PORCINE) LOCK FLUSH IV SOLN 100 UNIT/ML 100 UNIT/ML
300 SOLUTION INTRAVENOUS PRN
Status: DISCONTINUED | OUTPATIENT
Start: 2019-12-23 | End: 2019-12-24 | Stop reason: HOSPADM

## 2019-12-23 RX ADMIN — DEXAMETHASONE SODIUM PHOSPHATE 10 MG: 10 INJECTION, SOLUTION INTRAMUSCULAR; INTRAVENOUS at 14:02

## 2019-12-23 RX ADMIN — PACLITAXEL 90 MG: 6 INJECTION, SOLUTION INTRAVENOUS at 14:20

## 2019-12-23 RX ADMIN — CARBOPLATIN 222 MG: 10 INJECTION, SOLUTION INTRAVENOUS at 15:22

## 2019-12-23 RX ADMIN — PALONOSETRON HYDROCHLORIDE 0.25 MG: 0.25 INJECTION, SOLUTION INTRAVENOUS at 14:01

## 2019-12-23 RX ADMIN — FAMOTIDINE 20 MG: 10 INJECTION, SOLUTION INTRAVENOUS at 14:02

## 2019-12-23 RX ADMIN — DIPHENHYDRAMINE HYDROCHLORIDE: 50 INJECTION, SOLUTION INTRAMUSCULAR; INTRAVENOUS at 14:02

## 2019-12-23 ASSESSMENT — PAIN SCALES - GENERAL: PAINLEVEL_OUTOF10: 0

## 2019-12-24 ENCOUNTER — HOSPITAL ENCOUNTER (OUTPATIENT)
Dept: RADIATION ONCOLOGY | Facility: HOSPITAL | Age: 49
Setting detail: RADIATION/ONCOLOGY SERIES
Discharge: HOME | End: 2019-12-24

## 2019-12-24 PROCEDURE — 77336 RADIATION PHYSICS CONSULT: CPT | Performed by: RADIOLOGY

## 2019-12-24 PROCEDURE — 77386: CPT | Performed by: RADIOLOGY

## 2019-12-30 ENCOUNTER — HOSPITAL ENCOUNTER (OUTPATIENT)
Dept: INFUSION THERAPY | Age: 49
Discharge: HOME | End: 2019-12-30

## 2019-12-30 ENCOUNTER — OFFICE VISIT (OUTPATIENT)
Dept: HEMATOLOGY | Age: 49
End: 2019-12-30
Payer: COMMERCIAL

## 2019-12-30 VITALS
SYSTOLIC BLOOD PRESSURE: 84 MMHG | HEART RATE: 90 BPM | DIASTOLIC BLOOD PRESSURE: 42 MMHG | HEIGHT: 70 IN | RESPIRATION RATE: 16 BRPM | BODY MASS INDEX: 20.04 KG/M2 | WEIGHT: 140 LBS | TEMPERATURE: 96.7 F

## 2019-12-30 VITALS — WEIGHT: 140.7 LBS | BODY MASS INDEX: 20.19 KG/M2

## 2019-12-30 DIAGNOSIS — C15.9 MALIGNANT NEOPLASM OF ESOPHAGUS, UNSPECIFIED LOCATION (HCC): Primary | ICD-10-CM

## 2019-12-30 DIAGNOSIS — C15.9 ESOPHAGEAL ADENOCARCINOMA (HCC): Primary | ICD-10-CM

## 2019-12-30 PROCEDURE — 96375 TX/PRO/DX INJ NEW DRUG ADDON: CPT

## 2019-12-30 PROCEDURE — 6360000002 HC RX W HCPCS: Performed by: INTERNAL MEDICINE

## 2019-12-30 PROCEDURE — 2580000003 HC RX 258: Performed by: INTERNAL MEDICINE

## 2019-12-30 PROCEDURE — 99214 OFFICE O/P EST MOD 30 MIN: CPT | Performed by: INTERNAL MEDICINE

## 2019-12-30 PROCEDURE — 96415 CHEMO IV INFUSION ADDL HR: CPT

## 2019-12-30 PROCEDURE — 85025 COMPLETE CBC W/AUTO DIFF WBC: CPT

## 2019-12-30 PROCEDURE — 2500000003 HC RX 250 WO HCPCS: Performed by: INTERNAL MEDICINE

## 2019-12-30 PROCEDURE — 96413 CHEMO IV INFUSION 1 HR: CPT

## 2019-12-30 PROCEDURE — 96417 CHEMO IV INFUS EACH ADDL SEQ: CPT

## 2019-12-30 RX ORDER — SODIUM CHLORIDE 0.9 % (FLUSH) 0.9 %
5 SYRINGE (ML) INJECTION PRN
Status: CANCELLED | OUTPATIENT
Start: 2019-12-30

## 2019-12-30 RX ORDER — HEPARIN SODIUM (PORCINE) LOCK FLUSH IV SOLN 100 UNIT/ML 100 UNIT/ML
300 SOLUTION INTRAVENOUS PRN
Status: CANCELLED | OUTPATIENT
Start: 2019-12-30

## 2019-12-30 RX ORDER — SODIUM CHLORIDE 0.9 % (FLUSH) 0.9 %
10 SYRINGE (ML) INJECTION PRN
Status: DISCONTINUED | OUTPATIENT
Start: 2019-12-30 | End: 2019-12-31 | Stop reason: HOSPADM

## 2019-12-30 RX ORDER — HEPARIN SODIUM (PORCINE) LOCK FLUSH IV SOLN 100 UNIT/ML 100 UNIT/ML
300 SOLUTION INTRAVENOUS PRN
Status: DISCONTINUED | OUTPATIENT
Start: 2019-12-30 | End: 2019-12-31 | Stop reason: HOSPADM

## 2019-12-30 RX ORDER — PALONOSETRON 0.05 MG/ML
0.25 INJECTION, SOLUTION INTRAVENOUS ONCE
Status: COMPLETED | OUTPATIENT
Start: 2019-12-30 | End: 2019-12-30

## 2019-12-30 RX ORDER — SODIUM CHLORIDE 9 MG/ML
INJECTION, SOLUTION INTRAVENOUS CONTINUOUS
Status: CANCELLED | OUTPATIENT
Start: 2019-12-30

## 2019-12-30 RX ORDER — EPINEPHRINE 1 MG/ML
0.3 INJECTION, SOLUTION, CONCENTRATE INTRAVENOUS PRN
Status: CANCELLED | OUTPATIENT
Start: 2019-12-30

## 2019-12-30 RX ORDER — DIPHENHYDRAMINE HYDROCHLORIDE 50 MG/ML
50 INJECTION INTRAMUSCULAR; INTRAVENOUS ONCE
Status: CANCELLED | OUTPATIENT
Start: 2019-12-30

## 2019-12-30 RX ORDER — METHYLPREDNISOLONE SODIUM SUCCINATE 125 MG/2ML
125 INJECTION, POWDER, LYOPHILIZED, FOR SOLUTION INTRAMUSCULAR; INTRAVENOUS ONCE
Status: CANCELLED | OUTPATIENT
Start: 2019-12-30

## 2019-12-30 RX ORDER — DIPHENHYDRAMINE HYDROCHLORIDE 50 MG/ML
25 INJECTION INTRAMUSCULAR; INTRAVENOUS ONCE
Status: COMPLETED | OUTPATIENT
Start: 2019-12-30 | End: 2019-12-30

## 2019-12-30 RX ORDER — DIPHENHYDRAMINE HYDROCHLORIDE 50 MG/ML
25 INJECTION INTRAMUSCULAR; INTRAVENOUS ONCE
Status: CANCELLED | OUTPATIENT
Start: 2019-12-30

## 2019-12-30 RX ORDER — SODIUM CHLORIDE 9 MG/ML
20 INJECTION, SOLUTION INTRAVENOUS ONCE
Status: CANCELLED | OUTPATIENT
Start: 2019-12-30

## 2019-12-30 RX ORDER — DEXAMETHASONE SODIUM PHOSPHATE 10 MG/ML
10 INJECTION, SOLUTION INTRAMUSCULAR; INTRAVENOUS ONCE
Status: COMPLETED | OUTPATIENT
Start: 2019-12-30 | End: 2019-12-30

## 2019-12-30 RX ORDER — PALONOSETRON 0.05 MG/ML
0.25 INJECTION, SOLUTION INTRAVENOUS ONCE
Status: CANCELLED | OUTPATIENT
Start: 2019-12-30

## 2019-12-30 RX ORDER — SODIUM CHLORIDE 0.9 % (FLUSH) 0.9 %
10 SYRINGE (ML) INJECTION PRN
Status: CANCELLED | OUTPATIENT
Start: 2019-12-30

## 2019-12-30 RX ADMIN — DEXAMETHASONE SODIUM PHOSPHATE 10 MG: 10 INJECTION, SOLUTION INTRAMUSCULAR; INTRAVENOUS at 13:26

## 2019-12-30 RX ADMIN — PACLITAXEL 90 MG: 6 INJECTION, SOLUTION INTRAVENOUS at 13:57

## 2019-12-30 RX ADMIN — DIPHENHYDRAMINE HYDROCHLORIDE 25 MG: 50 INJECTION, SOLUTION INTRAMUSCULAR; INTRAVENOUS at 13:26

## 2019-12-30 RX ADMIN — CARBOPLATIN 222 MG: 10 INJECTION, SOLUTION INTRAVENOUS at 15:00

## 2019-12-30 RX ADMIN — PALONOSETRON 0.25 MG: 0.05 INJECTION, SOLUTION INTRAVENOUS at 13:26

## 2019-12-30 RX ADMIN — FAMOTIDINE 20 MG: 10 INJECTION, SOLUTION INTRAVENOUS at 13:26

## 2020-01-06 ENCOUNTER — HOSPITAL ENCOUNTER (OUTPATIENT)
Dept: INFUSION THERAPY | Age: 50
Discharge: HOME | End: 2020-01-06
Payer: OTHER GOVERNMENT

## 2020-01-06 DIAGNOSIS — C15.9 ESOPHAGEAL ADENOCARCINOMA (HCC): ICD-10-CM

## 2020-01-06 PROCEDURE — 85025 COMPLETE CBC W/AUTO DIFF WBC: CPT

## 2020-01-07 ENCOUNTER — HOSPITAL ENCOUNTER (OUTPATIENT)
Dept: GENERAL RADIOLOGY | Age: 50
Discharge: HOME | End: 2020-01-07
Payer: OTHER GOVERNMENT

## 2020-01-07 PROCEDURE — 73030 X-RAY EXAM OF SHOULDER: CPT

## 2020-01-07 PROCEDURE — 73560 X-RAY EXAM OF KNEE 1 OR 2: CPT

## 2020-01-13 ENCOUNTER — HOSPITAL ENCOUNTER (OUTPATIENT)
Dept: INFUSION THERAPY | Age: 50
Discharge: HOME | End: 2020-01-13
Payer: OTHER GOVERNMENT

## 2020-01-13 DIAGNOSIS — C15.9 ESOPHAGEAL ADENOCARCINOMA (HCC): ICD-10-CM

## 2020-01-13 PROCEDURE — 85025 COMPLETE CBC W/AUTO DIFF WBC: CPT

## 2020-01-24 NOTE — PROGRESS NOTES
Patient:  Silverio Street  YOB: 1970  Date of Service: 1/27/2020  MRN: [id number]   Primary Care Physician: No primary care provider on file. Advance Directive:     Chief Complaint   Patient presents with    Other     Esophageal adenocarcinoma       Patient Seen, Chart, Consults notes, Labs, Radiology studies reviewed. Subjective:   Silverio Street is a 57 y.o. male managed with primary and secondary diagnoses as outlined:  · Adenocarcinoma of the distal esophagus made by endoscopy on 9/27/2019. Cycle #1 of weekly Carboplatin (AUC of 2) with Taxol 50 mg/m²  was initiated on 10/28/2019 and the final cycle #9 delivered on 12/30/2019  XRT was initiated on 11/12/2019. He completed 28 treatment fractions on12/24/2019. Maximo Rose saw Dr. Renita Leblanc on 1/24/2020 at [facility] with repeat CT scan imaging anticipating surgical intervention in the near future. He comes today to this office for further discussion and recommendations. 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 Jeison Alvarenga was seen in initial oncology consultation on 10/9/2019 with a new diagnosis of adenocarcinoma of the distal esophagus referred by Dr. Michelle Rodriguez for further work-up, care coordination and treatment decisions. Maximo Rose states that 20 years ago he had an episode when a large piece of chicken got stuck in his throat requiring removal.  Since that time he has had occasional dysphasia problems but nothing significant. When symptoms began several weeks prior to presentation, he initially thought it was something similar and for that reason did not seek immediate evaluation.     On 9/18/2019, Maximo Rose presented to [facility] ED with a complaint of 3 to 4 weeks of difficulty swallowing and approximately 20 pound the last 72 hours. LIVER PROFILE: No results for input(s): AST, ALT, LIPASE, BILIDIR, BILITOT, ALKPHOS in the last 72 hours. Invalid input(s): AMYLASE,  ALB  PT/INR: No results for input(s): PROTIME, INR in the last 72 hours. APTT: No results for input(s): APTT in the last 72 hours. BNP:  No results for input(s): BNP in the last 72 hours. Ionized Calcium:No results for input(s): IONCA in the last 72 hours. Magnesium:No results for input(s): MG in the last 72 hours. Phosphorus:No results for input(s): PHOS in the last 72 hours. HgbA1C: No results for input(s): LABA1C in the last 72 hours. Hepatic: No results for input(s): ALKPHOS, ALT, AST, PROT, BILITOT, BILIDIR, LABALBU in the last 72 hours. Amylase and Lipase:No results for input(s): LACTA, AMYLASE in the last 72 hours. Lactic Acid: No results for input(s): LACTA in the last 72 hours. Troponin: No results for input(s): CKTOTAL, CKMB, TROPONINT in the last 72 hours. ABGs: No results for input(s): PH, PCO2, PO2, HCO3, O2SAT in the last 72 hours. CRP:  No results for input(s): CRP in the last 72 hours. Sed Rate:  No results for input(s): SEDRATE in the last 72 hours. Cultures:   No results for input(s): CULTURE in the last 72 hours. Radiology reports as per the Radiologist  Radiology:     XR Chest Standard (2 Vw)  Result Date: 10/4/2019  EXAMINATION:  XR CHEST (2 VW)  10/4/2019 3:34 PM   HISTORY: Cancer associated chest pain, esophageal mass. Status post esophageal stent placement. COMPARISON: Intraoperative chest x-rays 10/2/2019. FINDINGS:  PA and lateral views of the chest were obtained. The lungs are clear and mildly hyperinflated. Heart size is normal. The lung stent is noted in the distal esophagus, there is mild narrowing of the stent proximally. Position of the stent appears similar to the intraoperative study 2 days ago. No pneumothorax or pleural effusion is identified. The osseous structures are unremarkable.     Hyperinflated lungs with no infiltrates, no acute findings. There is a new esophageal stent distally. Signed by Dr Duyen Ledesma on 10/4/2019 3:36 PM      XR Chest 1 Vw  Result Date: 10/2/2019  XR CHEST 1 VIEW 10/2/2019 2:15 PM   History: Esophageal mass, EGD with fluoroscopy and stent placement   Comparison: None   Fluoroscopy dose: 53.45 mGy. Number of images: 9    Impression:   Intraoperative fluoroscopic images obtained during EGD with fluoroscopy   Please refer to the operative note for more details. Signed by Dr Edgardo Gregory on 10/2/2019 5:23 PM      ASSESSMENT AND PLAN:  Erma Holm is a 57 y.o. male managed with primary and secondary diagnoses as outlined:  · Adenocarcinoma of the distal esophagus made by endoscopy on 9/27/2019. Cycle #1 of weekly Carboplatin (AUC of 2) with Taxol 50 mg/m²  was initiated on 10/28/2019 and the final cycle #9 delivered on 12/30/2019  XRT was initiated on 11/12/2019. He completed 28 treatment fractions on12/24/2019. Elda Echevarria saw Dr. Inocencio Poole on 1/24/2020 at [facility] with repeat CT scan imaging anticipating surgical intervention in the near future. He comes today to this office for further discussion and recommendations. CBC today reveals a WBC of 4.07. Hgb is 11.6 with an MCV of 107 and a platelet count is 382,148. Elda Echevarria is feeling better than he has in a very long time. Over the past couple of weeks he has been able to eat pretty much everything in sight. He has put on 10 pounds since last seen. He is doing remarkably well. On physical examination there is no evidence of supra or infraclavicular lymphadenopathy. Lungs are clear the heart is regular the abdomen is soft and benign. Mr. Maci Friedman is receiving systemic chemotherapy via a peripheral vein, he desires to hold off on a port unless absolutely necessary.     CT chest, abdomen with contrast at [facility] on 1/24/2020 documented:  · Patient's known esophageal adenocarcinoma somewhat poorly defined, but subjectively appears decreased in size since 9/27/2019. · Slight decrease in size of periesophageal lymph node  · No evidence of new or worsening disease in chest  · No findings to suggest metastatic disease in abdomen     Dr. Shilpa Holcomb explained the procedure and expectations risks and benefits of esophagectomy in detail to [facility]. He seemed to understand quite well but is very nervous and apprehensive as he has been since the very first.  He states that he is apprehensive mostly because now he can eat and gain weight and does not desire to compromise the improved status that he is in currently. I encouraged him in every way possible. He is tentatively scheduled to have surgery with Dr. Shilpa Holcomb at [facility] on 2/21/2020. I answered all the questions as well as possible. I will see him in follow-up in 2 months or sooner if he desires to discuss things further. [facility] was seen today for other. Diagnoses and all orders for this visit:    Esophageal adenocarcinoma ([facility] [address]         No orders of the defined types were placed in this encounter. Return in about 2 months (around 3/27/2020) for follow-up w/ Dr. Arlet Langley. Saray Vera am scribing for Meliton Terrell MD. Electronically signed by Robb Vela LPN on 7/47/4501 at 1:82 PM     I, Dr. Shalonda Lopez, personally performed the services described in this documentation as scribed by Robb Vela LPN in my presence, and it is both accurate and complete.

## 2020-01-27 ENCOUNTER — HOSPITAL ENCOUNTER (OUTPATIENT)
Dept: INFUSION THERAPY | Age: 50
Discharge: HOME | End: 2020-01-27
Payer: OTHER GOVERNMENT

## 2020-01-27 ENCOUNTER — OFFICE VISIT (OUTPATIENT)
Dept: HEMATOLOGY | Age: 50
End: 2020-01-27
Payer: COMMERCIAL

## 2020-01-27 VITALS
HEIGHT: 70 IN | HEART RATE: 117 BPM | SYSTOLIC BLOOD PRESSURE: 120 MMHG | OXYGEN SATURATION: 96 % | WEIGHT: 141.6 LBS | DIASTOLIC BLOOD PRESSURE: 70 MMHG | BODY MASS INDEX: 20.27 KG/M2

## 2020-01-27 DIAGNOSIS — C15.9 ESOPHAGEAL ADENOCARCINOMA (HCC): ICD-10-CM

## 2020-01-27 PROCEDURE — 85025 COMPLETE CBC W/AUTO DIFF WBC: CPT

## 2020-01-27 PROCEDURE — 99214 OFFICE O/P EST MOD 30 MIN: CPT | Performed by: INTERNAL MEDICINE

## 2020-01-27 PROCEDURE — 99211 OFF/OP EST MAY X REQ PHY/QHP: CPT

## 2020-01-31 ENCOUNTER — TELEPHONE (OUTPATIENT)
Dept: HEMATOLOGY | Age: 50
End: 2020-01-31

## 2020-04-20 ENCOUNTER — HOSPITAL ENCOUNTER (OUTPATIENT)
Dept: INFUSION THERAPY | Age: 50
Discharge: HOME | End: 2020-04-20
Payer: OTHER GOVERNMENT

## 2020-04-20 ENCOUNTER — HOSPITAL ENCOUNTER (OUTPATIENT)
Dept: INFUSION THERAPY | Age: 50
End: 2020-04-20

## 2020-04-20 ENCOUNTER — OFFICE VISIT (OUTPATIENT)
Dept: HEMATOLOGY | Age: 50
End: 2020-04-20
Payer: COMMERCIAL

## 2020-04-20 VITALS
HEIGHT: 70 IN | SYSTOLIC BLOOD PRESSURE: 100 MMHG | TEMPERATURE: 98.3 F | BODY MASS INDEX: 18.17 KG/M2 | HEART RATE: 80 BPM | WEIGHT: 126.9 LBS | OXYGEN SATURATION: 94 % | DIASTOLIC BLOOD PRESSURE: 82 MMHG

## 2020-04-20 DIAGNOSIS — C15.9 ESOPHAGEAL ADENOCARCINOMA (HCC): ICD-10-CM

## 2020-04-20 PROCEDURE — 99215 OFFICE O/P EST HI 40 MIN: CPT | Performed by: INTERNAL MEDICINE

## 2020-04-20 PROCEDURE — 99212 OFFICE O/P EST SF 10 MIN: CPT

## 2020-04-20 PROCEDURE — 85025 COMPLETE CBC W/AUTO DIFF WBC: CPT

## 2020-04-20 RX ORDER — OXYCODONE HYDROCHLORIDE 5 MG/1
5 TABLET ORAL EVERY 6 HOURS PRN
Qty: 100 TABLET | Refills: 0 | Status: SHIPPED | OUTPATIENT
Start: 2020-04-20 | End: 2020-05-20

## 2020-04-20 RX ORDER — OXYCODONE HCL 10 MG/1
10 TABLET, FILM COATED, EXTENDED RELEASE ORAL 2 TIMES DAILY
Qty: 60 TABLET | Refills: 0 | Status: CANCELLED | OUTPATIENT
Start: 2020-04-20 | End: 2020-05-20

## 2020-04-20 RX ORDER — OXYCODONE AND ACETAMINOPHEN 10; 325 MG/1; MG/1
1 TABLET ORAL EVERY 6 HOURS PRN
Qty: 100 TABLET | Refills: 0 | Status: SHIPPED | OUTPATIENT
Start: 2020-04-20 | End: 2020-04-20 | Stop reason: CLARIF

## 2020-04-20 NOTE — PROGRESS NOTES
adenocarcinoma with signet ring cell differentiation, focally arising in the background of Willingham's esophagus. Focal high-grade glandular dysplasia was also seen along with chronic mild inflammation. CT scan of the chest with contrast on 2019 identified the following:  · Wall thickening of the mid to distal third of the esophagus position approximately 5 cm below the level of the leoncio  · Nonspecific subcentimeter paraesophageal lymph nodes  · No pathologic mediastinal or hilar adenopathy noted  · No visible axillary lymphadenopathy    CT scan of the abdomen and pelvis with contrast on 2019 documented the following:  · A 4 mm subcentimeter hypodensity in the medial left hepatic lobe, adjacent to the right hepatic lobe, too small to definitely characterize with no additional focal liver lesions identified. · A 1.3 cm lower paraesophageal lymph node near the gastrohepatic ligament may represent regional lymphatic metastasis. No additional retroperitoneal or abdominal lymphadenopathy visualized. · Spleen, pancreas, gallbladder and adrenal glands were unremarkable. There were no enhancing renal masses. The bladder appeared intact and the prostate was not enlarged    Referral was made for EGD and stent placement    On 10/2/2019 Dr. Nikia Kirk performed an EGD with esophageal stent placement under fluoroscopic guidance. Findings were as follows:  · Abnormal mucosa with stricturing and narrowing of the esophageal lumen was noted at 34 cm, consistent with the report by Dr. Live Mckenzie. · Switching to a  endoscope, Dr. Dread Diego was unable to pass the mass and he again switched to an adult scope. · An ERCP wire was passed and documented to have coiled in the stomach under fluoroscopic evaluation. · An 18 mm biliary extraction balloon was passed over the wire, contrast was injected to determine the distal anatomy, and this was confirmed to have been in the stomach.   The balloon was inflated and withdrawn slowly to identify the GE junction as well as the distal and proximal extents of the tumor. All of these processes were photographed with fluoroscopy. · Dr. Esme Mendez felt that the stricture was 3-4 cm in longitudinal distance. Procedure:  A 23mm x 12.5 cm fully covered self-expanding metal esophageal Wallstent was advanced and stented across the stricture under fluoroscopic guidance. It appeared to be in proper position. A  scope was again read-passed down into the esophagus after several minutes. Dr. Esme Mendez was still unable to pass the  scope through the deployed stent signifying the severity of the stricture. There was a large amount of fluid emanating from the stomach which was suctioned through the scope. Photographs were obtained. On 10/9/2019 Mr. Van was initially evaluated in this office. His examination did not disclose palpable supraclavicular lymphadenopathy nor acute or worrisome abdominal findings related to the above diagnosis. Serology on 10/9/2019 documented a normal CEA of 2.6 with an elevated CA 19-9 of 72.2    PET/CT on 10/14/2019 documented abnormal FDG uptake in the mid to distal esophagus with an SUV of 10.63 compatible with the diagnosis of esophageal adenocarcinoma  Notation was made of the fact that the esophageal stent was located completely within the stomach. Radiation oncology consultation by Dr. Frankie Espinoza and the Rasheed Hernandez PA-C was performed on 10/23/2019. 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 The plan was discussed with Dr. Frankie Espinoza with plans for neoadjuvant concurrent XRT with weekly Carboplatin and Taxol chemotherapy based on the  [facility] experience.      Esophageal Adenocarcinoma    PTSD (post-traumatic stress disorder)     no diagnosed       Past Surgical History:  Past Surgical History:   Procedure Laterality Date    ANTERIOR CRUCIATE LIGAMENT REPAIR Right     x 2    ERCP  10/21/2019    Dr AMADOU Garcia/placement of a fully covered SEMS 23mm x 12.cm esophageal stent    UPPER GASTROINTESTINAL ENDOSCOPY N/A 10/2/2019    Dr AMADOU Garcia/Successful placement of a 23 mm x 12.5 cm fully covered self-expanding esophageal Wallstent under fluoroscopic guidance    UPPER GASTROINTESTINAL ENDOSCOPY N/A 10/21/2019    Dr AMADOU Garcia/Successful placement of a 23 mm x 12.5 cm fully covered self-expanding esophageal Wallstent under fluoroscopic guidance    UPPER GASTROINTESTINAL ENDOSCOPY  10/21/2019    Dr AMADOU Garcia/Successful placement of a 23 mm x 12.5 cm fully covered self-expanding esophageal Wallstent under fluoroscopic guidance    UPPER GASTROINTESTINAL ENDOSCOPY N/A 10/28/2019    Dr Megan Garcia/Removal of previously migrated esophageal stent x2       Family History  No family history on file.     Social History  Social History     Socioeconomic History    Marital status:      Spouse name: Not on file    Number of children: Not on file    Years of education: Not on file    Highest education level: Not on file   Occupational History    Not on file   Social Needs    Financial resource strain: Not on file    Food insecurity     Worry: Not on file     Inability: Not on file    Transportation needs     Medical: Not on file     Non-medical: Not on file   Tobacco Use    Smoking status: Current Every Day Smoker     Packs/day: 1.00     Years: 34.00     Pack years: 34.00    Smokeless tobacco: Current User   Substance and Sexual Activity    Alcohol use: Never     Frequency: Never    Drug use: Never    Sexual activity: Not on file   Lifestyle    Physical activity     Days per week: Not on file     Minutes per session: Not on file    Stress: Not on file Normal range of motion. Neck supple. No JVD. No appreciable thyromegaly. Cardiovascular: Normal rate, regular rhythm, normal heart sounds and intact distal pulses. Exam reveals no gallop, murmurs or friction rub. Pulmonary/Chest: Effort normal and breath sounds normal. No respiratory distress. No wheezes. Abdominal: Soft. Bowel sounds are normal. No organomegally or masses. No tenderness. There is no rebound and no guarding. Musculoskeletal: Normal range of motion. No edema or tenderness. Lymphadenopathy: No cervical, axillary or inguinal lymphadenopathy. Neurological: Alert and oriented to person, place, and time. Cranial nerves are intact. Neurological exam is nonfocal  Skin: Skin is warm and dry. No rash noted. No erythema. No pallor. Psychiatric: Judgment normal.       Labs:  BMP: No results for input(s): NA, K, CL, CO2, PHOS, BUN, CREATININE in the last 72 hours. Invalid input(s): CA  CBC:   Recent Labs     07/20/20  1345   WBC 4.40   HGB 13.5*   HCT 41.2   .5*        LIVER PROFILE: No results for input(s): AST, ALT, LIPASE, BILIDIR, BILITOT, ALKPHOS in the last 72 hours. Invalid input(s): AMYLASE,  ALB  PT/INR: No results for input(s): PROTIME, INR in the last 72 hours. APTT: No results for input(s): APTT in the last 72 hours. BNP:  No results for input(s): BNP in the last 72 hours. Ionized Calcium:No results for input(s): IONCA in the last 72 hours. Magnesium:No results for input(s): MG in the last 72 hours. Phosphorus:No results for input(s): PHOS in the last 72 hours. HgbA1C: No results for input(s): LABA1C in the last 72 hours. Hepatic: No results for input(s): ALKPHOS, ALT, AST, PROT, BILITOT, BILIDIR, LABALBU in the last 72 hours. Amylase and Lipase:No results for input(s): LACTA, AMYLASE in the last 72 hours. Lactic Acid: No results for input(s): LACTA in the last 72 hours.   Troponin: No results for input(s): CKTOTAL, CKMB, TROPONINT in the last 72 completed 28 treatment fractions on 12/24/2019. On 3/3/2020 Dr Nely Rodas performed a Bronchoscopy,EGD,Exploratory laparotomy, Right thoracotomy with mobilization of the esophagus, Ligation of the thoracic duct, Three-field esophagectomy with cervical anastomosis, Pyloroplasty, and Jejunostomy feeding tube placement. Liseth Verduzco underwent a telemedicine conference with Dr. Nely Rodas at [facility] today. I have communicated with Dr. Juan Lemus who has been able to look at his incisions, tube sites etc.  He feels all is well from the surgical standpoint. Liseth Verduzco comes today to review results of his surgery, pathology and further decision-making. CBC today reveals a WBC of 6.44. Hgb is 14.7 with an MCV of 106.3 and a platelet count is 228,633. Liseth Verduzco is feeling well, he is able to swallow so much better than preoperatively. He feels that his esophagus is now wide open. He still has a way to go with weight gain but feels he is gaining. On physical examination there is no evidence of supra or infraclavicular lymphadenopathy. Lungs are clear the heart is regular. Abdomen is postop as outlined by Dr. Nely Rodas, healing well    CT chest, abdomen with contrast at [facility] on 1/24/2020 documented:  · Patient's known esophageal adenocarcinoma somewhat poorly defined, but subjectively appears decreased in size since 9/27/2019. · Slight decrease in size of periesophageal lymph node  · No evidence of new or worsening disease in chest  · No findings to suggest metastatic disease in abdomen     On 3/3/2020 Dr Nely Rodas performed the following procedures at [facility]:  9. Bronchoscopy  10. EGD  11. Exploratory laparotomy   12. Right thoracotomy with mobilization of the esophagus  13. Ligation of the thoracic duct  14. Three-field esophagectomy with cervical anastomosis   15.  Pyloroplasty  16. Jejunostomy feeding tube  Operative findings:  · Normal endobronchial anatomy  · Near complete

## 2020-05-26 PROBLEM — Z92.3 HISTORY OF RADIATION THERAPY: Status: ACTIVE | Noted: 2020-05-26

## 2020-05-27 ENCOUNTER — HOSPITAL ENCOUNTER (OUTPATIENT)
Dept: RADIATION ONCOLOGY | Facility: HOSPITAL | Age: 50
Setting detail: RADIATION/ONCOLOGY SERIES
End: 2020-05-27

## 2020-07-17 NOTE — PROGRESS NOTES
Patient:  Ruth Ann Jarrett  YOB: 1970  Date of Service: 7/20/2020  MRN: [id number]   Primary Care Physician: No primary care provider on file. Advance Directive:     Chief Complaint   Patient presents with    Other     Esophageal adenocarcinoma       Patient Seen, Chart, Consults notes, Labs, Radiology studies reviewed. Subjective:   Ruth Ann Jarrett is a 54 y.o. male managed with primary and secondary diagnoses as outlined:  · Adenocarcinoma of the distal esophagus made by endoscopy on 9/27/2019. · Tumor screening and health maintenance    Cycle #1 of neoadjuvant weekly Carboplatin (AUC of 2) with Taxol 50 mg/m²  was initiated on 10/28/2019 and the final cycle #9 delivered on 12/30/2019  XRT was initiated on 11/12/2019. He completed 28 treatment fractions on 12/24/2019. On 3/3/2020 Dr Jenny Rashid performed a Bronchoscopy,EGD,Exploratory laparotomy, Right thoracotomy with mobilization of the esophagus, Ligation of the thoracic duct, Three-field esophagectomy with cervical anastomosis, Pyloroplasty, and Jejunostomy feeding tube placement. Ada Lemus underwent a telemedicine conference with Dr. Jenny Rashid at [facility] on 4/20/2020. I was able to communicate with Dr. Matt Guardado who was been able to look at his incisions, tube sites etc.    He felt all was well from the surgical standpoint. Over the last couple of months, Ada Lemus has been having problems with certain foods like breads and things of that nature stick in the upper esophagus, he presumes about the place where he had the anastomosis. He has only lost about 3 pounds since his last visit 3 months ago. 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 For that reason he decided to go ahead and retire early.   He is continued active around the house doing yard chores and things of that nature but is currently with Dr. Yosvany Beltran at the GI clinic on 9/26/2019 for evaluation of the above complaints. Arrangements were made for an EGD. On 9/27/2019 Dr. Yosvany Beltran performed an upper GI endoscopy and biopsy. Findings were as follows:  · A large fungating mass with no stigmata of recent bleeding in the lower third of the esophagus at 33 cm from the incisors. · The mass was completely obstructing and circumferential  · A pediatric endoscope was unable to be passed. · Biopsies were taken for histology. 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 Focal high-grade glandular dysplasia was also seen along with chronic mild inflammation. CT scan of the chest with contrast on 9/27/2019 identified the following:  · Wall thickening of the mid to distal third of the esophagus position approximately 5 cm below the level of the leoncio  · Nonspecific subcentimeter paraesophageal lymph nodes  · No pathologic mediastinal or hilar adenopathy noted  · No visible axillary lymphadenopathy    CT scan of the abdomen and pelvis with contrast on 9/27/2019 documented the following:  · A 4 mm subcentimeter hypodensity in the medial left hepatic lobe, adjacent to the right hepatic lobe, too small to definitely characterize with no additional focal liver lesions identified. · A 1.3 cm lower paraesophageal lymph node near the gastrohepatic ligament may represent regional lymphatic metastasis. No additional retroperitoneal or abdominal lymphadenopathy visualized. · Spleen, pancreas, gallbladder and adrenal glands were unremarkable. There were no enhancing renal masses.   The bladder appeared intact and the prostate was not enlarged    Referral was made for EGD and stent placement    On 10/2/2019 Dr. Haseeb White performed an EGD with esophageal stent placement under fluoroscopic guidance. Findings were as follows:  · Abnormal mucosa with stricturing and narrowing of the esophageal lumen was noted at 34 cm, consistent with the report by Dr. Vanessa Collet. · Switching to a  endoscope, Dr. Bakari Arnold was unable to pass the mass and he again switched to an adult scope. · An ERCP wire was passed and documented to have coiled in the stomach under fluoroscopic evaluation. · An 18 mm biliary extraction balloon was passed over the wire, contrast was injected to determine the distal anatomy, and this was confirmed to have been in the stomach. The balloon was inflated and withdrawn slowly to identify the GE junction as well as the distal and proximal extents of the tumor. All of these processes were photographed with fluoroscopy. · Dr. Bakari Arnold felt that the stricture was 3-4 cm in longitudinal distance. Procedure:  A 23mm x 12.5 cm fully covered self-expanding metal esophageal Wallstent was advanced and stented across the stricture under fluoroscopic guidance. It appeared to be in proper position. A  scope was again read-passed down into the esophagus after several minutes. Dr. Bakari Arnold was still unable to pass the  scope through the deployed stent signifying the severity of the stricture. There was a large amount of fluid emanating from the stomach which was suctioned through the scope. Photographs were obtained. On 10/9/2019 Mr. Van was initially evaluated in this office. His examination did not disclose palpable supraclavicular lymphadenopathy nor acute or worrisome abdominal findings related to the above diagnosis.     Serology on 10/9/2019 documented a normal CEA of 2.6 with an elevated CA 19-9 of 72.2    PET/CT on 10/14/2019 documented abnormal FDG uptake in the mid to distal esophagus with an SUV of 10.63 compatible with the diagnosis of esophageal adenocarcinoma  Notation was made of the fact that the esophageal stent was located completely within the stomach. Radiation oncology consultation by Dr. Ching Berg and the [facility] was performed on 10/23/2019. 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 The plan was discussed with Dr. Ching Berg with plans for neoadjuvant concurrent XRT with weekly Carboplatin and Taxol chemotherapy based on the  [facility] Trial experience. Mr. Jamaica Sal was evaluated by Dr. Eduardo Jackson at [facility] in [place] on 10/31/2019. Dr. Williams Hernandez and recommendation was to proceed with neoadjuvant chemotherapy and XRT to be followed by surgical resection. He desires to see Mr. Van upon completion of neoadjuvant therapy. Results still pending:  · Serology for Guardant 360 molecular markers  · Path to [facility] for NGS, PDL 1 and MSI  · Consideration may also need to be given for MRI of the abdomen with and without contrast to further evaluate the liver. Will await surgical opinion by Dr. Eduardo Jackson at [facility]. Weekly Carboplatin at an AUC of 2 with Taxol 50 mg/m² initiated 10/28/2019 and the final cycle #9 delivered on 12/30/2019    XRT was initiated on 11/12/2019 He completed 28 treatment fractions on 12/24/2019. CT chest, abdomen with contrast at [facility] on 1/24/2020 documented:  · Patient's known esophageal adenocarcinoma somewhat poorly defined, but subjectively appears decreased in size since 9/27/2019. · Slight decrease in size of periesophageal lymph node  · No evidence of new or worsening disease in chest  · No findings to suggest metastatic disease in abdomen     On 3/3/2020 Dr Eduardo Jackson performed the following procedures at [facility]:  1. Bronchoscopy  2. EGD  3.  Exploratory laparotomy performed a Bronchoscopy,EGD,Exploratory laparotomy, Right thoracotomy with mobilization of the esophagus, Ligation of the thoracic duct, Three-field esophagectomy with cervical anastomosis, Pyloroplasty, and Jejunostomy feeding tube placement. Allergies:  Eggs or egg-derived products    Medicines:  No current outpatient medications on file. No current facility-administered medications for this visit. Past Medical History:  Past Medical History:   Diagnosis Date    Esophageal mass     GERD (gastroesophageal reflux disease)     Hx of blood clots     DVT post op    Malignant neoplasm of esophagus, unspecified (HCC)     Esophageal Adenocarcinoma    PTSD (post-traumatic stress disorder)     no diagnosed       Past Surgical History:  Past Surgical History:   Procedure Laterality Date    ANTERIOR CRUCIATE LIGAMENT REPAIR Right     x 2    ERCP  10/21/2019    Dr AMADOU Garcia/placement of a fully covered SEMS 23mm x 12.cm esophageal stent    UPPER GASTROINTESTINAL ENDOSCOPY N/A 10/2/2019    Dr AMADOU Garcia/Successful placement of a 23 mm x 12.5 cm fully covered self-expanding esophageal Wallstent under fluoroscopic guidance    UPPER GASTROINTESTINAL ENDOSCOPY N/A 10/21/2019    Dr AMADOU Garcia/Successful placement of a 23 mm x 12.5 cm fully covered self-expanding esophageal Wallstent under fluoroscopic guidance    UPPER GASTROINTESTINAL ENDOSCOPY  10/21/2019    Dr AMADOU Garcia/Successful placement of a 23 mm x 12.5 cm fully covered self-expanding esophageal Wallstent under fluoroscopic guidance    UPPER GASTROINTESTINAL ENDOSCOPY N/A 10/28/2019    Dr Gayle Garcia/Removal of previously migrated esophageal stent x2       Family History  No family history on file.     Social History  Social History     Socioeconomic History    Marital status:      Spouse name: Not on file    Number of children: Not on file    Years of education: Not on file    Highest education level: Not on file   Occupational History    Not on file   Social Needs    Financial resource strain: Not on file    Food insecurity     Worry: Not on file     Inability: Not on file    Transportation needs     Medical: Not on file     Non-medical: Not on file   Tobacco Use    Smoking status: Current Every Day Smoker     Packs/day: 1.00     Years: 34.00     Pack years: 34.00    Smokeless tobacco: Current User   Substance and Sexual Activity    Alcohol use: Never     Frequency: Never    Drug use: Never    Sexual activity: Not on file   Lifestyle    Physical activity     Days per week: Not on file     Minutes per session: Not on file    Stress: Not on file   Relationships    Social connections     Talks on phone: Not on file     Gets together: Not on file     Attends Latter-day service: Not on file     Active member of club or organization: Not on file     Attends meetings of clubs or organizations: Not on file     Relationship status: Not on file    Intimate partner violence     Fear of current or ex partner: Not on file     Emotionally abused: Not on file     Physically abused: Not on file     Forced sexual activity: Not on file   Other Topics Concern    Not on file   Social History Narrative    Not on file         Review of Systems:  Constitutional: Negative for chills, fatigue, fever or significant weight loss. HENT: Negative for congestion, hearing loss, nosebleeds or sore throat. Eyes: Negative for photophobia, pain, discharge, redness and visual disturbance. Respiratory: Negative for cough, shortness of breath, or wheezing. Cardiovascular: Negative for chest pain, palpitations or leg swelling. Gastrointestinal: Negative for abdominal pain, blood in stool, constipation, diarrhea, nausea or vomiting. Genitourinary: Negative for dysuria, flank pain, frequency, hematuria or urgency. Musculoskeletal: Negative for back pain, joint swelling, myalgias or neck pain. Skin: Negative for rash or petechiae.    Neurological: Negative for tremors, seizures, syncope, weakness or headaches. Hematological: No active bruising or bleeding. Psychiatric/Behavioral: Negative for hallucinations. Objective:  Blood pressure 98/62, pulse 82, temperature 99.1 °F (37.3 °C), temperature source Temporal, height 5' 10\" (1.778 m), weight 123 lb (55.8 kg), SpO2 96 %. Physical Exam   Constitutional: Oriented to person, place, and time. No acute distress. Head: Normocephalic and atraumatic. Nose: Nose normal.   Mouth/Throat: Oropharynx is clear and moist. No oropharyngeal exudate. Eyes: Pupils are equal and round. Conjunctivae and EOM are normal. No scleral icterus. Neck: Normal range of motion. Neck supple. No JVD. No appreciable thyromegaly. Cardiovascular: Normal rate, regular rhythm, normal heart sounds and intact distal pulses. Exam reveals no gallop, murmurs or friction rub. Pulmonary/Chest: Effort normal and breath sounds normal. No respiratory distress. No wheezes. Abdominal: Soft. Bowel sounds are normal. No organomegally or masses. No tenderness. There is no rebound and no guarding. Musculoskeletal: Normal range of motion. No edema or tenderness. Lymphadenopathy: No cervical, axillary or inguinal lymphadenopathy. Neurological: Alert and oriented to person, place, and time. Cranial nerves are intact. Neurological exam is nonfocal  Skin: Skin is warm and dry. No rash noted. No erythema. No pallor. Psychiatric: Judgment normal.       Labs:  BMP: No results for input(s): NA, K, CL, CO2, PHOS, BUN, CREATININE in the last 72 hours. Invalid input(s): CA  CBC:   Recent Labs     07/20/20  1345   WBC 4.40   HGB 13.5*   HCT 41.2   .5*        LIVER PROFILE: No results for input(s): AST, ALT, LIPASE, BILIDIR, BILITOT, ALKPHOS in the last 72 hours. Invalid input(s): AMYLASE,  ALB  PT/INR: No results for input(s): PROTIME, INR in the last 72 hours. APTT: No results for input(s): APTT in the last 72 hours.   BNP:  No results for input(s): BNP in the last 72 hours. Ionized Calcium:No results for input(s): IONCA in the last 72 hours. Magnesium:No results for input(s): MG in the last 72 hours. Phosphorus:No results for input(s): PHOS in the last 72 hours. HgbA1C: No results for input(s): LABA1C in the last 72 hours. Hepatic: No results for input(s): ALKPHOS, ALT, AST, PROT, BILITOT, BILIDIR, LABALBU in the last 72 hours. Amylase and Lipase:No results for input(s): LACTA, AMYLASE in the last 72 hours. Lactic Acid: No results for input(s): LACTA in the last 72 hours. Troponin: No results for input(s): CKTOTAL, CKMB, TROPONINT in the last 72 hours. ABGs: No results for input(s): PH, PCO2, PO2, HCO3, O2SAT in the last 72 hours. CRP:  No results for input(s): CRP in the last 72 hours. Sed Rate:  No results for input(s): SEDRATE in the last 72 hours. Cultures:   No results for input(s): CULTURE in the last 72 hours. Radiology reports as per the Radiologist  Radiology:     XR Chest Standard (2 Vw)  Result Date: 10/4/2019  EXAMINATION:  XR CHEST (2 VW)  10/4/2019 3:34 PM   HISTORY: Cancer associated chest pain, esophageal mass. Status post esophageal stent placement. COMPARISON: Intraoperative chest x-rays 10/2/2019. FINDINGS:  PA and lateral views of the chest were obtained. The lungs are clear and mildly hyperinflated. Heart size is normal. The lung stent is noted in the distal esophagus, there is mild narrowing of the stent proximally. Position of the stent appears similar to the intraoperative study 2 days ago. No pneumothorax or pleural effusion is identified. The osseous structures are unremarkable. Hyperinflated lungs with no infiltrates, no acute findings. There is a new esophageal stent distally. Signed by Dr Mitra Leahy.  Baltazar on 10/4/2019 3:36 PM      XR Chest 1 Vw  Result Date: 10/2/2019  XR CHEST 1 VIEW 10/2/2019 2:15 PM   History: Esophageal mass, EGD with fluoroscopy and stent placement CBC 4/20/2020 revealed a WBC of 6.44. Hgb is 14.7 with an MCV of 106.3 and a platelet count is 503,026. CBC today (7/20/2020) reveals a WBC of 4.40. Hgb is 13.5 with an MCV of 101.5 and platelet count of 663,326. Chema Morales is feeling well, he is able to swallow so much better than preoperatively. He feels that his esophagus is now wide open. He still has a way to go with weight gain but feels he is gaining. On physical examination there is no evidence of supra or infraclavicular lymphadenopathy. Lungs are clear the heart is regular. Abdomen benign. Because he is starting to lose a little bit of weight and having dysphasia to certain foods, I am going to go ahead and try to get him in touch with Dr. Martinez Fall in case he needs another EGD and possible dilatation. Repeat CMP CEA are being done today. Follow-up appointment is also given. Chema Morales was seen today for other. Diagnoses and all orders for this visit:    Esophageal adenocarcinoma [facility])  -     External Referral To General Surgery  -     CEA; Future  -     Comprehensive Metabolic Panel; Future         Orders Placed This Encounter   Procedures    CEA     Standing Status:   Future     Standing Expiration Date:   7/20/2021    Comprehensive Metabolic Panel     Standing Status:   Future     Standing Expiration Date:   7/20/2021    External Referral To General Surgery     Referral Priority:   Routine     Referral Type:   Eval and Treat     Referral Reason:   Specialty Services Required     Referred to Provider:   Martinez Fall MD     Requested Specialty:   Thoracic Surgery     Number of Visits Requested:   1       Return in about 3 months (around 10/20/2020) for F/U WITH DR Anita Perez.       Bianka Cronin am scribing for Evon Vargas MD. Electronically signed by Unique Duncan LPN on 8/42/2706 at 9:46 PM     I, Dr. Tonny Banks, personally performed the services described in this documentation as scribed by [facility]

## 2020-07-20 ENCOUNTER — OFFICE VISIT (OUTPATIENT)
Dept: HEMATOLOGY | Age: 50
End: 2020-07-20
Payer: COMMERCIAL

## 2020-07-20 ENCOUNTER — HOSPITAL ENCOUNTER (OUTPATIENT)
Dept: INFUSION THERAPY | Age: 50
Discharge: HOME | End: 2020-07-20
Payer: OTHER GOVERNMENT

## 2020-07-20 VITALS
DIASTOLIC BLOOD PRESSURE: 62 MMHG | HEART RATE: 82 BPM | TEMPERATURE: 99.1 F | WEIGHT: 123 LBS | HEIGHT: 70 IN | OXYGEN SATURATION: 96 % | SYSTOLIC BLOOD PRESSURE: 98 MMHG | BODY MASS INDEX: 17.61 KG/M2

## 2020-07-20 DIAGNOSIS — C15.9 ESOPHAGEAL ADENOCARCINOMA (HCC): ICD-10-CM

## 2020-07-20 LAB
BASOPHILS ABSOLUTE: 0.04 K/UL (ref 0.01–0.08)
BASOPHILS RELATIVE PERCENT: 0.9 % (ref 0.1–1.2)
EOSINOPHILS ABSOLUTE: 0.36 K/UL (ref 0.04–0.54)
EOSINOPHILS RELATIVE PERCENT: 8.2 % (ref 0.7–7)
HCT VFR BLD CALC: 41.2 % (ref 40.1–51)
HEMOGLOBIN: 13.5 G/DL (ref 13.7–17.5)
LYMPHOCYTES ABSOLUTE: 1.02 K/UL (ref 1.18–3.74)
LYMPHOCYTES RELATIVE PERCENT: 23.2 % (ref 19.3–53.1)
MCH RBC QN AUTO: 33.3 PG (ref 25.7–32.2)
MCHC RBC AUTO-ENTMCNC: 32.8 G/DL (ref 32.3–36.5)
MCV RBC AUTO: 101.5 FL (ref 79–92.2)
MONOCYTES ABSOLUTE: 0.46 K/UL (ref 0.24–0.82)
MONOCYTES RELATIVE PERCENT: 10.5 % (ref 4.7–12.5)
NEUTROPHILS ABSOLUTE: 2.52 K/UL (ref 1.56–6.13)
NEUTROPHILS RELATIVE PERCENT: 57.2 % (ref 34–71.1)
PDW BLD-RTO: 12.7 % (ref 11.6–14.4)
PLATELET # BLD: 243 K/UL (ref 163–337)
PMV BLD AUTO: 9 FL (ref 7.4–10.4)
RBC # BLD: 4.06 M/UL (ref 4.63–6.08)
WBC # BLD: 4.4 K/UL (ref 4.23–9.07)

## 2020-07-20 PROCEDURE — 99214 OFFICE O/P EST MOD 30 MIN: CPT | Performed by: INTERNAL MEDICINE

## 2020-07-20 PROCEDURE — 85025 COMPLETE CBC W/AUTO DIFF WBC: CPT

## 2020-07-20 PROCEDURE — 99212 OFFICE O/P EST SF 10 MIN: CPT

## 2020-11-12 ENCOUNTER — OFFICE VISIT (OUTPATIENT)
Dept: HEMATOLOGY | Age: 50
End: 2020-11-12
Payer: OTHER GOVERNMENT

## 2020-11-12 ENCOUNTER — HOSPITAL ENCOUNTER (OUTPATIENT)
Dept: INFUSION THERAPY | Age: 50
Discharge: HOME | End: 2020-11-12
Payer: OTHER GOVERNMENT

## 2020-11-12 VITALS
HEIGHT: 70 IN | SYSTOLIC BLOOD PRESSURE: 112 MMHG | WEIGHT: 118 LBS | HEART RATE: 122 BPM | DIASTOLIC BLOOD PRESSURE: 92 MMHG | BODY MASS INDEX: 16.89 KG/M2 | TEMPERATURE: 98.2 F | OXYGEN SATURATION: 96 %

## 2020-11-12 DIAGNOSIS — C15.9 ESOPHAGEAL ADENOCARCINOMA (HCC): ICD-10-CM

## 2020-11-12 LAB
BASOPHILS ABSOLUTE: 0.03 K/UL (ref 0.01–0.08)
BASOPHILS RELATIVE PERCENT: 0.3 % (ref 0.1–1.2)
EOSINOPHILS ABSOLUTE: 0.61 K/UL (ref 0.04–0.54)
EOSINOPHILS RELATIVE PERCENT: 6.9 % (ref 0.7–7)
HCT VFR BLD CALC: 50.2 % (ref 40.1–51)
HEMOGLOBIN: 16.9 G/DL (ref 13.7–17.5)
LYMPHOCYTES ABSOLUTE: 0.98 K/UL (ref 1.18–3.74)
LYMPHOCYTES RELATIVE PERCENT: 11.1 % (ref 19.3–53.1)
MCH RBC QN AUTO: 32.9 PG (ref 25.7–32.2)
MCHC RBC AUTO-ENTMCNC: 33.7 G/DL (ref 32.3–36.5)
MCV RBC AUTO: 97.7 FL (ref 79–92.2)
MONOCYTES ABSOLUTE: 0.62 K/UL (ref 0.24–0.82)
MONOCYTES RELATIVE PERCENT: 7 % (ref 4.7–12.5)
NEUTROPHILS ABSOLUTE: 6.62 K/UL (ref 1.56–6.13)
NEUTROPHILS RELATIVE PERCENT: 74.7 % (ref 34–71.1)
PDW BLD-RTO: 12.2 % (ref 11.6–14.4)
PLATELET # BLD: 189 K/UL (ref 163–337)
PMV BLD AUTO: 10.3 FL (ref 7.4–10.4)
RBC # BLD: 5.14 M/UL (ref 4.63–6.08)
WBC # BLD: 8.86 K/UL (ref 4.23–9.07)

## 2020-11-12 PROCEDURE — 99214 OFFICE O/P EST MOD 30 MIN: CPT | Performed by: NURSE PRACTITIONER

## 2020-11-12 PROCEDURE — 85025 COMPLETE CBC W/AUTO DIFF WBC: CPT

## 2020-11-12 PROCEDURE — 99212 OFFICE O/P EST SF 10 MIN: CPT

## 2020-11-12 RX ORDER — ASPIRIN 81 MG/1
81 TABLET ORAL DAILY
COMMUNITY

## 2020-11-12 NOTE — PROGRESS NOTES
Patient:  Anna Harris  YOB: 1970  Date of Service: 11/12/2020  MRN: [id number]   Primary Care Physician: No primary care provider on file. Advance Directive:     Chief Complaint   Patient presents with    Follow-up       Esophageal adenocarcinoma [facility]         Patient Seen, Chart, Consults notes, Labs, Radiology studies reviewed. Subjective:   Anna Harris is a 54 y.o. male managed with primary and secondary diagnoses as outlined:  · Adenocarcinoma of the distal esophagus made by endoscopy on 9/27/2019. · Tumor screening and health maintenance    Regarding his diagnosis of distal esophageal adenocarcinoma, Roberto Carlos Mckenzie received 9 cycles of neoadjuvant weekly carboplatinum/Taxol 10/28/2019-12/30/2019. XRT was completed 12/24/2019 for 1228 treatment fractions. On 3/3/2020 Dr Clinton Clarke performed a Bronchoscopy,EGD,Exploratory laparotomy, Right thoracotomy with mobilization of the esophagus, Ligation of the thoracic duct, Three-field esophagectomy with cervical anastomosis, Pyloroplasty, and Jejunostomy feeding tube placement. He has required esophageal dilatation. Roberto Carlos Mckenzie reports he had been doing very well postoperatively up until a few weeks ago. He is having nausea, abdominal discomfort, early satiety, unexplained weight loss, back pain and feeling poorly in general.  Upon exam, he has muscle wasting. Weight is decreased by 8 pounds over the past 6 months. 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 Zane Preciado was seen in initial oncology consultation on 10/9/2019 with a new diagnosis of adenocarcinoma of the distal esophagus referred by Dr. Cristian Marks for further work-up, care coordination and treatment decisions.     Roberto Carlos Mckenzie states that 20 years ago he had an episode when a large piece of chicken got stuck in his throat requiring removal.  Since that time he has had occasional dysphasia problems but nothing significant. When symptoms began several weeks prior to presentation, he initially thought it was something similar and for that reason did not seek immediate evaluation. On 9/18/2019, Britany Avery presented to [facility] ED with a complaint of 3 to 4 weeks of difficulty swallowing and approximately 20 pound weight loss reported. He was seen in the emergency room by Wil Whaley PA-C working with Dr. Tonya Alvarado. 3 weeks prior to presentation he began feeling pain with swallowing solid foods. He switched to a soft food diet. He noted that he was still able to tolerate liquids and denied vomiting. He stated that with swallowing however the food seemed to get caught in his mid chest area. After instructions on diet etc., he was encouraged to follow-up with a PCP and consideration for GI follow-up as well thereafter. A list of physicians was provided for him to review and pick a provider. Britany Avery was evaluated by Oumar HUANG on 9/25/2019 reporting that by this time he was already having problems with liquids in addition to the solid and soft foods previously described at the [facility] ED. A referral was made for GI evaluation. Mr. Carlo Guido was seen by REINA Weems with Dr. Michael Bay at the GI clinic on 9/26/2019 for evaluation of the above complaints. Arrangements were made for an EGD. On 9/27/2019 Dr. Michael Bay performed an upper GI endoscopy and biopsy. Findings were as follows:  · A large fungating mass with no stigmata of recent bleeding in the lower third of the esophagus at 33 cm from the incisors. · The mass was completely obstructing and circumferential  · A pediatric endoscope was unable to be passed. · Biopsies were taken for histology.   · Ryan-colored mucosa was present and extended from the mass up to 28 cm    Pathology documented invasive poorly differentiated adenocarcinoma with signet ring cell differentiation, focally arising in the background of Willingham's esophagus. Focal high-grade glandular dysplasia was also seen along with chronic mild inflammation. CT scan of the chest with contrast on 2019 identified the following:  · Wall thickening of the mid to distal third of the esophagus position approximately 5 cm below the level of the leoncio  · Nonspecific subcentimeter paraesophageal lymph nodes  · No pathologic mediastinal or hilar adenopathy noted  · No visible axillary lymphadenopathy    CT scan of the abdomen and pelvis with contrast on 2019 documented the following:  · A 4 mm subcentimeter hypodensity in the medial left hepatic lobe, adjacent to the right hepatic lobe, too small to definitely characterize with no additional focal liver lesions identified. · A 1.3 cm lower paraesophageal lymph node near the gastrohepatic ligament may represent regional lymphatic metastasis. No additional retroperitoneal or abdominal lymphadenopathy visualized. · Spleen, pancreas, gallbladder and adrenal glands were unremarkable. There were no enhancing renal masses. The bladder appeared intact and the prostate was not enlarged    Referral was made for EGD and stent placement    On 10/2/2019 Dr. Eduarda Holt performed an EGD with esophageal stent placement under fluoroscopic guidance. Findings were as follows:  · Abnormal mucosa with stricturing and narrowing of the esophageal lumen was noted at 34 cm, consistent with the report by Dr. Araceli Dowd. · Switching to a  endoscope, Dr. Osvaldo Mares was unable to pass the mass and he again switched to an adult scope. · An ERCP wire was passed and documented to have coiled in the stomach under fluoroscopic evaluation.   · An 18 mm biliary extraction balloon was passed over the wire, contrast was injected to determine the distal anatomy, and this was confirmed to have been in the stomach. The balloon was inflated and withdrawn slowly to identify the GE junction as well as the distal and proximal extents of the tumor. All of these processes were photographed with fluoroscopy. · Dr. Nancy Graham felt that the stricture was 3-4 cm in longitudinal distance. Procedure:  A 23mm x 12.5 cm fully covered self-expanding metal esophageal Wallstent was advanced and stented across the stricture under fluoroscopic guidance. It appeared to be in proper position. A  scope was again read-passed down into the esophagus after several minutes. Dr. Nancy Graham was still unable to pass the  scope through the deployed stent signifying the severity of the stricture. There was a large amount of fluid emanating from the stomach which was suctioned through the scope. Photographs were obtained. On 10/9/2019 Mr. Van was initially evaluated in this office. His examination did not disclose palpable supraclavicular lymphadenopathy nor acute or worrisome abdominal findings related to the above diagnosis. Serology on 10/9/2019 documented a normal CEA of 2.6 with an elevated CA 19-9 of 72.2    PET/CT on 10/14/2019 documented abnormal FDG uptake in the mid to distal esophagus with an SUV of 10.63 compatible with the diagnosis of esophageal adenocarcinoma  Notation was made of the fact that the esophageal stent was located completely within the stomach. Radiation oncology consultation by Dr. Anna Marie Lopez and the Reggie Gipson PA-C was performed on 10/23/2019. 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    The plan was discussed with Dr. Anna Marie Lopez with plans for neoadjuvant concurrent XRT with weekly Carboplatin and Taxol chemotherapy based on the [employer] experience. Mr. Karen Camejo was evaluated by Dr. Gerardo Flowres at [facility] in [place] on 10/31/2019. Dr. Mary Ann Anna and recommendation was to proceed with neoadjuvant chemotherapy and XRT to be followed by surgical resection. He desires to see Mr. Van upon completion of neoadjuvant therapy. Results still pending:  · Serology for Guardant 360 molecular markers  · Path to [facility] for NGS, PDL 1 and MSI  · Consideration may also need to be given for MRI of the abdomen with and without contrast to further evaluate the liver. Will await surgical opinion by Dr. Gerardo Flowers at [facility]. Weekly Carboplatin at an AUC of 2 with Taxol 50 mg/m² initiated 10/28/2019 and the final cycle #9 delivered on 12/30/2019    XRT was initiated on 11/12/2019 He completed 28 treatment fractions on 12/24/2019. CT chest, abdomen with contrast at [facility] on 1/24/2020 documented:  · Patient's known esophageal adenocarcinoma somewhat poorly defined, but subjectively appears decreased in size since 9/27/2019. · Slight decrease in size of periesophageal lymph node  · No evidence of new or worsening disease in chest  · No findings to suggest metastatic disease in abdomen     On 3/3/2020 Dr Gerardo Flowers performed the following procedures at [facility]:  1. Bronchoscopy  2. EGD  3. Exploratory laparotomy   4. Right thoracotomy with mobilization of the esophagus  5. Ligation of the thoracic duct  6. Three-field esophagectomy with cervical anastomosis   7. Pyloroplasty  8.  Jejunostomy feeding tube  Operative findings:  · Normal endobronchial anatomy  · Near complete obstruction at 32 cm   · Willingham's esophagus extending up to 27 cm  · No evidence of cancer or mass on retroflexion in the stomach  · Esophagus extremely stuck to the mediastinum and no clear planes were present near the tumor  Pathology revealed the following:     Esophagus, esophagectomy:   · Minimal residual invasive adenocarcinoma (0.2 cm)  · All margins negative for carcinoma   · Treatment effect present   · 18 lymph nodes, negative for carcinoma       Lymph node, level 7, excision:  · One lymph node, negative for carcinoma  Bone. Sixth rib right, excision:  · Bone and marrow elements with no significant histopathologic change    On 3/7/2020 Dr. July Dumont performed a bronchoscopy with bronchoalveolar lavage and found a large mucous plug occluding the left mainstem bronchus. Irrigation and suction performed and irrigant sent for culture. Culture revealed:  1. MRSA  2. E-coli  3. Candida albicans  4. AFB showed no growth at 6 weeks    On 3/10/2020 July Dumont performed a bronchoscopy which revealed a large mucous plug including the left mainstem bronchus. This was completely removed and irrigated. No evidence of plugging on the right. He was treated at [facility] with vancomycin for the MRSA while hospitalized. Tiago Zafar is pleased with the outcome of surgery and the results of pathology. He does not desire further chemotherapy. No further chemotherapy will be recommended. TREATMENT SUMMARY:  1. Weekly Carboplatin at an AUC of 2 with Taxol 50 mg/m² initiated 10/28/2019 and the final cycle #9 delivered on 12/30/2019  2. XRT was initiated on 11/12/2019, he completed 28 treatment fractions on 12/24/2019.  3. On 3/3/2020 Dr July Dumont performed a Bronchoscopy,EGD,Exploratory laparotomy, Right thoracotomy with mobilization of the esophagus, Ligation of the thoracic duct, Three-field esophagectomy with cervical anastomosis, Pyloroplasty, and Jejunostomy feeding tube placement. Allergies:  Eggs or egg-derived products    Medicines:  Current Outpatient Medications   Medication Sig Dispense Refill    aspirin 81 MG EC tablet Take 81 mg by mouth daily       No current facility-administered medications for this visit.       Past Medical History:   Diagnosis Date    Esophageal mass     GERD (gastroesophageal reflux disease)     Hx of blood clots     DVT post op    Malignant neoplasm of esophagus, unspecified (HCC)     Esophageal Adenocarcinoma    PTSD (post-traumatic stress disorder)     no diagnosed     Past Surgical History:   Procedure Laterality Date    ABDOMINAL EXPLORATION SURGERY      ANTERIOR CRUCIATE LIGAMENT REPAIR Right     x 2    ERCP  10/21/2019    Dr AMADOU Garcia/placement of a fully covered SEMS 23mm x 12.cm esophageal stent    ESOPHAGECTOMY      OTHER SURGICAL HISTORY      feeding tube placement    THORACOTOMY      UPPER GASTROINTESTINAL ENDOSCOPY N/A 10/2/2019    Dr AMADOU Garcia/Successful placement of a 23 mm x 12.5 cm fully covered self-expanding esophageal Wallstent under fluoroscopic guidance    UPPER GASTROINTESTINAL ENDOSCOPY N/A 10/21/2019    Dr AMADOU Garcia/Successful placement of a 23 mm x 12.5 cm fully covered self-expanding esophageal Wallstent under fluoroscopic guidance    UPPER GASTROINTESTINAL ENDOSCOPY  10/21/2019    Dr AMADOU Garcia/Successful placement of a 23 mm x 12.5 cm fully covered self-expanding esophageal Wallstent under fluoroscopic guidance    UPPER GASTROINTESTINAL ENDOSCOPY N/A 10/28/2019    Dr Mary Garcia/Removal of previously migrated esophageal stent x2     Family History  Family History   Problem Relation Age of Onset    Lung Cancer Father 40     Social History  Social History     Socioeconomic History    Marital status:      Spouse name: Not on file    Number of children: Not on file    Years of education: Not on file    Highest education level: Not on file   Occupational History    Not on file   Social Needs    Financial resource strain: Not on file    Food insecurity     Worry: Not on file     Inability: Not on file    Transportation needs     Medical: Not on file     Non-medical: Not on file   Tobacco Use    Smoking status: Current Every Day Smoker     Packs/day: 0.50     Years: 34.00     Pack years: 17.00    Smokeless tobacco: Current User   Substance and Sexual Activity    Alcohol use: Never     Frequency: Never    Drug use: Never    Sexual activity: Not on file   Lifestyle    Physical activity     Days per week: Not on file     Minutes per session: Not on file    Stress: Not on file   Relationships    Social connections     Talks on phone: Not on file     Gets together: Not on file     Attends Mandaeism service: Not on file     Active member of club or organization: Not on file     Attends meetings of clubs or organizations: Not on file     Relationship status: Not on file    Intimate partner violence     Fear of current or ex partner: Not on file     Emotionally abused: Not on file     Physically abused: Not on file     Forced sexual activity: Not on file   Other Topics Concern    Not on file   Social History Narrative    Not on file     Review of Systems   Constitutional: Positive for activity change, appetite change, fatigue and unexpected weight change (weight loss). Negative for fever. No night sweats   HENT: Negative for dental problem, hearing loss, mouth sores, nosebleeds, sore throat and trouble swallowing. Eyes: Negative for discharge and itching. Respiratory: Negative for cough, shortness of breath and wheezing. No hemoptysis   Cardiovascular: Negative for chest pain, palpitations and leg swelling. Gastrointestinal: Positive for nausea. Negative for abdominal pain, constipation, diarrhea and vomiting. Early satiety   Endocrine: Negative for cold intolerance and heat intolerance. Genitourinary: Negative for dysuria, frequency, hematuria and urgency. Musculoskeletal: Positive for arthralgias and back pain. Negative for joint swelling and myalgias. Skin: Negative for pallor and rash. Allergic/Immunologic: Negative for environmental allergies and immunocompromised state. Neurological: Negative for seizures, syncope and numbness. Hematological: Negative for adenopathy. Does not bruise/bleed easily. Psychiatric/Behavioral: Negative for agitation, behavioral problems and confusion. The patient is not nervous/anxious. Objective:  Blood pressure (!) 112/92, pulse 122, temperature 98.2 °F (36.8 °C), height 5' 10\" (1.778 m), weight 118 lb (53.5 kg), SpO2 96 %. Physical Exam  Vitals signs reviewed. Constitutional:       General: He is not in acute distress. Appearance: He is well-developed. He is not toxic-appearing or diaphoretic. Comments: Thin, underweight. Appears chronically ill   HENT:      Head: Normocephalic and atraumatic. Right Ear: External ear normal.      Left Ear: External ear normal.      Nose: Nose normal.      Mouth/Throat:      Mouth: Mucous membranes are moist.   Eyes:      General: No scleral icterus. Right eye: No discharge. Left eye: No discharge. Conjunctiva/sclera: Conjunctivae normal.   Neck:      Musculoskeletal: Neck supple. Trachea: No tracheal deviation. Cardiovascular:      Rate and Rhythm: Normal rate and regular rhythm. Pulmonary:      Effort: Pulmonary effort is normal. No respiratory distress. Breath sounds: Normal breath sounds. No wheezing or rales. Abdominal:      General: Bowel sounds are normal. There is no distension. Palpations: Abdomen is soft. Tenderness: There is no abdominal tenderness. There is no guarding. Genitourinary:     Comments: Exam deferred  Musculoskeletal:         General: No tenderness or deformity. Comments: Normal ROM all four extremities   Lymphadenopathy:      Cervical: No cervical adenopathy. Right cervical: No superficial cervical adenopathy. Left cervical: No superficial cervical adenopathy. Upper Body:      Right upper body: No supraclavicular adenopathy. Left upper body: No supraclavicular adenopathy. Comments: No bulky palpable cervical, clavicular, axillary or inguinal adenopathies on the left or right.      Skin:     General: Skin is warm and dry.      Findings: No rash. Neurological:      Mental Status: He is alert and oriented to person, place, and time. Comments: follows commands, non-focal   Psychiatric:         Behavior: Behavior normal. Behavior is cooperative. Thought Content: Thought content normal.         Judgment: Judgment normal.      Comments: Alert and oriented to person, place and time. CBC 11/12/2020: WBC 8.86, Hgb 16.9/MCV 97.7, platelets [phone]    ASSESSMENT AND PLAN:  Resected, distal esophageal adenocarcinoma  Diagnosis made via endoscopy 9/27/2019  9 cycles of neoadjuvant weekly carboplatin/Taxol delivered 10/28/2019-12/30/2019.  28 fractions of XRT was completed 12/24/2019. On 3/3/2020 Dr Joan Luis performed a Bronchoscopy, EGD, Exploratory laparotomy, Right thoracotomy with mobilization of the esophagus, Ligation of the thoracic duct, Three-field esophagectomy with cervical anastomosis, Pyloroplasty, and Jejunostomy feeding tube placement. Jaun Baltazar underwent a telemedicine conference with Dr. Joan Luis at [facility] on 4/20/2020. I was able to communicate with Dr. Sadie Guidry who was been able to look at his incisions, tube sites etc.    He felt all was well from the surgical standpoint. Nausea, weight loss, back pain    Repeat CMP, CEA, magnesium/phosphorus today    Arrange CT scans of the chest, abdomen/pelvis with bone windows for further evaluation. May need to consider referral back to Dr. Joan Luis for EGD and possible dilatation. Orders Placed This Encounter   Procedures    CT ABDOMEN PELVIS W IV CONTRAST Additional Contrast? Oral    CT CHEST W CONTRAST    Comprehensive Metabolic Panel    TSH without Reflex    Vitamin B12    Folate    CEA    Magnesium    Phosphorus     Jaun Baltazar is agreeable to contact the clinic if he does not get results of CT scans within the next week. Return in about 3 months (around 2/12/2021) for follow up with Dr. Marleni Gallo.

## 2020-11-13 ENCOUNTER — TELEPHONE (OUTPATIENT)
Dept: INFUSION THERAPY | Age: 50
End: 2020-11-13

## 2020-11-15 ASSESSMENT — ENCOUNTER SYMPTOMS
TROUBLE SWALLOWING: 0
BACK PAIN: 1
ABDOMINAL PAIN: 0
CONSTIPATION: 0
SORE THROAT: 0
EYE ITCHING: 0
DIARRHEA: 0
VOMITING: 0
COUGH: 0
WHEEZING: 0
NAUSEA: 1
EYE DISCHARGE: 0
SHORTNESS OF BREATH: 0

## 2020-11-19 ENCOUNTER — TELEPHONE (OUTPATIENT)
Dept: HEMATOLOGY | Age: 50
End: 2020-11-19

## 2020-11-19 ENCOUNTER — TELEPHONE (OUTPATIENT)
Dept: INFUSION THERAPY | Age: 50
End: 2020-11-19

## 2020-11-19 ENCOUNTER — VIRTUAL VISIT (OUTPATIENT)
Dept: HEMATOLOGY | Age: 50
End: 2020-11-19
Payer: OTHER GOVERNMENT

## 2020-11-19 ENCOUNTER — HOSPITAL ENCOUNTER (OUTPATIENT)
Dept: CT IMAGING | Age: 50
Discharge: HOME | End: 2020-11-19
Payer: OTHER GOVERNMENT

## 2020-11-19 PROCEDURE — 74177 CT ABD & PELVIS W/CONTRAST: CPT

## 2020-11-19 PROCEDURE — 71260 CT THORAX DX C+: CPT

## 2020-11-19 PROCEDURE — 6360000004 HC RX CONTRAST MEDICATION: Performed by: NURSE PRACTITIONER

## 2020-11-19 PROCEDURE — 99442 PR PHYS/QHP TELEPHONE EVALUATION 11-20 MIN: CPT | Performed by: NURSE PRACTITIONER

## 2020-11-19 RX ORDER — OXYCODONE AND ACETAMINOPHEN 7.5; 325 MG/1; MG/1
1 TABLET ORAL EVERY 6 HOURS PRN
Qty: 120 TABLET | Refills: 0 | Status: SHIPPED | OUTPATIENT
Start: 2020-11-19 | End: 2020-12-19

## 2020-11-19 RX ADMIN — IOPAMIDOL 75 ML: 755 INJECTION, SOLUTION INTRAVENOUS at 10:35

## 2020-11-19 ASSESSMENT — ENCOUNTER SYMPTOMS
COUGH: 0
WHEEZING: 0
ABDOMINAL PAIN: 0
NAUSEA: 1
SHORTNESS OF BREATH: 0
BACK PAIN: 1
CONSTIPATION: 0
VOMITING: 0
SORE THROAT: 0
EYE ITCHING: 0
DIARRHEA: 0
EYE DISCHARGE: 0
TROUBLE SWALLOWING: 0

## 2020-11-19 NOTE — TELEPHONE ENCOUNTER
Called and got patient registered for virtual visit with Najma Rae and he accepted the insurance disclaimer.

## 2020-11-19 NOTE — PROGRESS NOTES
Pt Name: Angel Abreu: 1970  MRN: [id number]    Date of evaluation: 2020  History Obtained From:  Patient, Laura Mack (:  1970) has requested an audio/visual evaluation for the following concern(s): CT scan results    Consent:  Kristin Franco and/or health care decision maker is aware that that he may receive a bill for this telephone service, depending on his insurance coverage, and has provided verbal consent to proceed: Yes      Documentation:  I communicated with the patient and/or health care decision maker about CT scan results. Details of this discussion including any medical advice provided: As delineated in the assessment and plan below    Patient identification was verified at the start of the visit: Yes    Virtual visit was attempted. Phoenix Feliciano was unable to access both microphone and camera on his cell phone, thus visit was performed via telephone. Chief Complaint   Patient presents with    Pain        HISTORY OF PRESENT ILLNESS:   Kristin Franco is a 54 y.o. 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 · Tumor screening and health maintenance     Regarding his diagnosis of distal esophageal adenocarcinoma, Phoeinx Feliciano received 9 cycles of neoadjuvant weekly carboplatinum/Taxol 10/28/2019-2019. XRT was completed 2019 for 1228 treatment fractions.     On 3/3/2020 Dr Laurent Valdez performed a Bronchoscopy,EGD,Exploratory laparotomy, Right thoracotomy with mobilization of the esophagus, Ligation of the thoracic duct, Three-field esophagectomy with cervical anastomosis, Pyloroplasty, and Jejunostomy feeding tube placement.     He has required esophageal dilatation.     Phoenix Feliciano was evaluated in the clinic 2020 in routine follow-up at which time he stated he had been doing very well postoperatively up until a few weeks ago.    He reported nausea, abdominal discomfort, early satiety, unexplained weight loss, back pain and feeling poorly in general.  He had lost at least 8 pounds over the prior 6 months. His main complaint is pain over the right flank area radiating to the front. Labs 11/12/2020 included an elevated CEA of 81.5 and elevated LFTs. CT scan of the chest, abdomen/pelvis were requested for further evaluation and completed this morning, 11/19/2020. I received a phone call from radiologist, Dr. Boyd Park, who reported findings concerning for metastatic disease. Callie Foley was contacted by Major Kaur RN and was requested to come to the clinic to discuss findings. Callie Foley stated he had no one to bring him to the clinic. He is very weak and declined presenting to the nearest emergency room. Callie Foley was agreeable for virtual visit which is performed at this time.         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 Janace Holter was seen in initial oncology consultation on 10/9/2019 with a new diagnosis of adenocarcinoma of the distal esophagus referred by Dr. Shoaib Abdi for further work-up, care coordination and treatment decisions.     Callie Foley states that 20 years ago he had an episode when a large piece of chicken got stuck in his throat requiring removal.  Since that time he has had occasional dysphasia problems but nothing significant. When symptoms began several weeks prior to presentation, he initially thought it was something similar and for that reason did not seek immediate evaluation.     On 9/18/2019, Callie Foley presented to [facility] ED with a complaint of 3 to 4 weeks of difficulty swallowing and approximately 20 pound weight loss reported. He was seen in the emergency room by Shruti Watkins PA-C working with Dr. Sapphire Peterson.    3 weeks visible axillary lymphadenopathy     CT scan of the abdomen and pelvis with contrast on 2019 documented the following:  · A 4 mm subcentimeter hypodensity in the medial left hepatic lobe, adjacent to the right hepatic lobe, too small to definitely characterize with no additional focal liver lesions identified. · A 1.3 cm lower paraesophageal lymph node near the gastrohepatic ligament may represent regional lymphatic metastasis. No additional retroperitoneal or abdominal lymphadenopathy visualized. · Spleen, pancreas, gallbladder and adrenal glands were unremarkable. There were no enhancing renal masses. The bladder appeared intact and the prostate was not enlarged     Referral was made for EGD and stent placement     On 10/2/2019 Dr. Eduarda Holt performed an EGD with esophageal stent placement under fluoroscopic guidance. Findings were as follows:  · Abnormal mucosa with stricturing and narrowing of the esophageal lumen was noted at 34 cm, consistent with the report by Dr. Araceli Dowd. · Switching to a  endoscope, Dr. Osvaldo Mares was unable to pass the mass and he again switched to an adult scope. · An ERCP wire was passed and documented to have coiled in the stomach under fluoroscopic evaluation. · An 18 mm biliary extraction balloon was passed over the wire, contrast was injected to determine the distal anatomy, and this was confirmed to have been in the stomach. The balloon was inflated and withdrawn slowly to identify the GE junction as well as the distal and proximal extents of the tumor. All of these processes were photographed with fluoroscopy. · Dr. Osvaldo Mares felt that the stricture was 3-4 cm in longitudinal distance. Procedure:  A 23mm x 12.5 cm fully covered self-expanding metal esophageal Wallstent was advanced and stented across the stricture under fluoroscopic guidance. It appeared to be in proper position.   A  scope was again read-passed down into the esophagus after several further evaluate the liver. Will await surgical opinion by Dr. Clinton Clarke at [facility].     Weekly Carboplatin at an AUC of 2 with Taxol 50 mg/m² initiated 10/28/2019 and the final cycle #9 delivered on 12/30/2019     XRT was initiated on 11/12/2019 He completed 28 treatment fractions on 12/24/2019.      CT chest, abdomen with contrast at [facility] on 1/24/2020 documented:  · Patient's known esophageal adenocarcinoma somewhat poorly defined, but subjectively appears decreased in size since 9/27/2019. · Slight decrease in size of periesophageal lymph node  · No evidence of new or worsening disease in chest  · No findings to suggest metastatic disease in abdomen      On 3/3/2020 Dr Clinton Clarke performed the following procedures at [facility]:  1. Bronchoscopy  2. EGD  3. Exploratory laparotomy   4. Right thoracotomy with mobilization of the esophagus  5. Ligation of the thoracic duct  6. Three-field esophagectomy with cervical anastomosis   7. Pyloroplasty  8. Jejunostomy feeding tube    Operative findings:  · Normal endobronchial anatomy  · Near complete obstruction at 32 cm   · Willingham's esophagus extending up to 27 cm  · No evidence of cancer or mass on retroflexion in the stomach  · Esophagus extremely stuck to the mediastinum and no clear planes were present near the tumor    Pathology revealed the following:     Esophagus, esophagectomy:   · Minimal residual invasive adenocarcinoma (0.2 cm)  · All margins negative for carcinoma   · Treatment effect present   · 18 lymph nodes, negative for carcinoma       Lymph node, level 7, excision:  · One lymph node, negative for carcinoma  Bone. Sixth rib right, excision:  · Bone and marrow elements with no significant histopathologic change     On 3/7/2020 Dr. Clinton Clarke performed a bronchoscopy with bronchoalveolar lavage and found a large mucous plug occluding the left mainstem bronchus. Irrigation and suction performed and irrigant sent for culture.   Culture revealed:  1. MRSA  2. E-coli  3. Candida albicans  4. AFB showed no growth at 6 weeks     On 3/10/2020 Joan Luis performed a bronchoscopy which revealed a large mucous plug including the left mainstem bronchus. This was completely removed and irrigated. No evidence of plugging on the right.      He was treated at [facility] with vancomycin for the MRSA while hospitalized.   Jake De La Cruz is pleased with the outcome of surgery and the results of pathology. He does not desire further chemotherapy. No further chemotherapy will be recommended.         TREATMENT SUMMARY:  1. Weekly Carboplatin at an AUC of 2 with Taxol 50 mg/m² initiated 10/28/2019 and the final cycle #9 delivered on 12/30/2019  2. XRT was initiated on 11/12/2019, he completed 28 treatment fractions on 12/24/2019.  3. On 3/3/2020 Dr Joan Luis performed a Bronchoscopy, EGD, Exploratory laparotomy, Right thoracotomy with mobilization of the esophagus, Ligation of the thoracic duct, Three-field esophagectomy with cervical anastomosis, Pyloroplasty, and Jejunostomy feeding tube placement.     Review of Systems   Constitutional: Positive for activity change, appetite change, fatigue and unexpected weight change (weight loss). Negative for fever. No night sweats   HENT: Negative for dental problem, hearing loss, mouth sores, nosebleeds, sore throat and trouble swallowing. Eyes: Negative for discharge and itching. Respiratory: Negative for cough, shortness of breath and wheezing. No hemoptysis   Cardiovascular: Negative for chest pain, palpitations and leg swelling. Gastrointestinal: Positive for nausea. Negative for abdominal pain, constipation, diarrhea and vomiting. Early satiety   Endocrine: Negative for cold intolerance and heat intolerance. Genitourinary: Negative for dysuria, frequency, hematuria and urgency. Musculoskeletal: Positive for arthralgias and back pain. Negative for joint swelling and myalgias.    Skin: Negative for pallor and rash. Allergic/Immunologic: Negative for environmental allergies and immunocompromised state. Neurological: Negative for seizures, syncope and numbness. Hematological: Negative for adenopathy. Does not bruise/bleed easily. Psychiatric/Behavioral: Negative for agitation, behavioral problems and confusion. The patient is not nervous/anxious. Prior to Visit Medications    Medication Sig Taking?  Authorizing Provider   aspirin 81 MG EC tablet Take 81 mg by mouth daily  Historical Provider, MD     Past Medical History:   Diagnosis Date    Esophageal mass     GERD (gastroesophageal reflux disease)     Hx of blood clots     DVT post op    Malignant neoplasm of esophagus, unspecified (HCC)     Esophageal Adenocarcinoma    PTSD (post-traumatic stress disorder)     no diagnosed     Past Surgical History:   Procedure Laterality Date    ABDOMINAL EXPLORATION SURGERY      ANTERIOR CRUCIATE LIGAMENT REPAIR Right     x 2    ERCP  10/21/2019    Dr AMADOU Garcia/placement of a fully covered SEMS 23mm x 12.cm esophageal stent    ESOPHAGECTOMY      OTHER SURGICAL HISTORY      feeding tube placement    THORACOTOMY      UPPER GASTROINTESTINAL ENDOSCOPY N/A 10/2/2019    Dr AMADOU Garcia/Successful placement of a 23 mm x 12.5 cm fully covered self-expanding esophageal Wallstent under fluoroscopic guidance    UPPER GASTROINTESTINAL ENDOSCOPY N/A 10/21/2019    Dr AMADOU Garcia/Successful placement of a 23 mm x 12.5 cm fully covered self-expanding esophageal Wallstent under fluoroscopic guidance    UPPER GASTROINTESTINAL ENDOSCOPY  10/21/2019    Dr AMADOU Garcia/Successful placement of a 23 mm x 12.5 cm fully covered self-expanding esophageal Wallstent under fluoroscopic guidance    UPPER GASTROINTESTINAL ENDOSCOPY N/A 10/28/2019    Dr Federico Garcia/Removal of previously migrated esophageal stent x2     Social History     Tobacco Use    Smoking status: Current Every Day Smoker     Packs/day: 0.50     Years: 34.00     Pack years: 17.00    Smokeless tobacco: Current User   Substance Use Topics    Alcohol use: Never     Frequency: Never    Drug use: Never      ASSESSMENT/PLAN:  1. Esophageal adenocarcinoma ([address]    2. Abnormal weight loss    3. Elevated CEA    4. Cancer associated pain    5. Fatigue, unspecified type    6. Neck mass    7. Liver metastasis ([address]    8.  Hydronephrosis due to obstruction of ureter      H/O Resected, distal esophageal adenocarcinoma  Diagnosis made via endoscopy 9/27/2019  S/p 9 cycles of neoadjuvant weekly carboplatin/Taxol delivered 10/28/2019-12/30/2019.  28 fractions of XRT was completed 12/24/2019.     On 3/3/2020 Dr Ella Brewer performed a Bronchoscopy, EGD, Exploratory laparotomy, Right thoracotomy with mobilization of the esophagus, Ligation of the thoracic duct, Three-field esophagectomy with cervical anastomosis, Pyloroplasty, and Jejunostomy feeding tube placement.     New onset nausea, weight loss, back pain/flank pain, generalized weakness     Labs 11/12/2020:  · CBC: WBC 8.86, Hgb 16.9/MCV 97.7, platelets 646,386  · CMP: Creatinine 1.14, GFR 76, alkaline phosphatase 313, ALT 47, AST 74  · TSH: 1.5  · Magnesium: 2.0  · Phosphorus: 3.6  · Folate: 6.2  · Vitamin B12 level: 1007     Contrasted chest CT 11/19/2020 at [facility]:  · new subcentimeter ill-defined pulmonary nodules bilaterally  · new 2.8 x 2.2 cm mass in the left proximal paratracheal area below the level of the thyroid that encases the left common carotid and proximal part of the left subclavian and proximal left vertebral artery  · Ill-defined density/debris in the distal trachea and proximal mainstem bronchi, possibly representing retained mucus or aspirated material  · Unchanged, ill-defined area of bony sclerosis in the posterior part of the vertebra T2    Contrasted abdominal/pelvic CT 11/19/2020 at [facility]:  · Too numerous to count hypoenhancing/cystic liver lesions, left greater than right suggestive of metastatic disease  · Moderate right hydroureteronephrosis  · 1.4 cm rounded enhancing nodules along the right pelvic sidewall in the region of the right distal ureter with appearance of an obstructing metastatic lesion along the distal ureter, adenopathy or soft tissue implant    Findings discussed in detail with Ritu Carranza including the likelihood of stage IV, noncurable cancer. All questions answered. Plan ultrasound-guided left neck mass biopsy and PET scan. RTC 2 weeks to discuss findings and treatment options. Ritu Carranza does desire to proceed with treatment. He currently has a very poor performance status generalized weakness, deconditioning, poor nutrition. Pain management is addressed with Rx Percocet 7.5 mg every 6 hours PRN (patient states he has tolerated Percocet in the past). Ritu Carranza states he will send his wife, Lawanda Rockwell to  the Rx for him when she is available. Findings discussed with Dr. Geovanny Staley who participated in the plan of care above. Orders Placed This Encounter   Procedures    PET CT SKULL BASE TO MID THIGH    [address] [place]     Return in about 2 weeks (around 12/3/2020) for follow up with REINA Mcmillan . Total Time: minutes: 11-20 minutes. Note: not billable if this call serves to triage the patient into an appointment for the relevant concern        REINA Ortega  4:18 PM  11/19/2020    An electronic signature was used to authenticate this note.

## 2020-11-19 NOTE — TELEPHONE ENCOUNTER
Shawnee Koch asked me to call patient and have him come in to see her today. I have to leave a message for patient to call. Fer Armendariz.  Frankey Flies

## 2020-11-30 ENCOUNTER — HOSPITAL ENCOUNTER (OUTPATIENT)
Dept: ULTRASOUND IMAGING | Age: 50
Discharge: HOME | End: 2020-11-30
Payer: OTHER GOVERNMENT

## 2020-11-30 ENCOUNTER — HOSPITAL ENCOUNTER (OUTPATIENT)
Dept: NUCLEAR MEDICINE | Age: 50
Discharge: HOME | End: 2020-12-02
Payer: OTHER GOVERNMENT

## 2020-11-30 LAB
GLUCOSE BLD-MCNC: 104 MG/DL (ref 70–99)
PERFORMED ON: ABNORMAL

## 2020-11-30 PROCEDURE — 82947 ASSAY GLUCOSE BLOOD QUANT: CPT

## 2020-11-30 PROCEDURE — 88173 CYTOPATH EVAL FNA REPORT: CPT

## 2020-11-30 PROCEDURE — A9552 F18 FDG: HCPCS | Performed by: NURSE PRACTITIONER

## 2020-11-30 PROCEDURE — 88177 CYTP FNA EVAL EA ADDL: CPT

## 2020-11-30 PROCEDURE — 76942 ECHO GUIDE FOR BIOPSY: CPT

## 2020-11-30 PROCEDURE — 10005 FNA BX W/US GDN 1ST LES: CPT

## 2020-11-30 PROCEDURE — 88172 CYTP DX EVAL FNA 1ST EA SITE: CPT

## 2020-11-30 PROCEDURE — 3430000000 HC RX DIAGNOSTIC RADIOPHARMACEUTICAL: Performed by: NURSE PRACTITIONER

## 2020-11-30 PROCEDURE — 78815 PET IMAGE W/CT SKULL-THIGH: CPT

## 2020-11-30 RX ORDER — FLUDEOXYGLUCOSE F 18 200 MCI/ML
10 INJECTION, SOLUTION INTRAVENOUS
Status: COMPLETED | OUTPATIENT
Start: 2020-11-30 | End: 2020-11-30

## 2020-11-30 RX ADMIN — FLUDEOXYGLUCOSE F 18 10 MILLICURIE: 200 INJECTION, SOLUTION INTRAVENOUS at 13:12

## 2020-12-09 ENCOUNTER — OFFICE VISIT (OUTPATIENT)
Dept: HEMATOLOGY | Age: 50
End: 2020-12-09
Payer: OTHER GOVERNMENT

## 2020-12-09 ENCOUNTER — HOSPITAL ENCOUNTER (OUTPATIENT)
Dept: INFUSION THERAPY | Age: 50
Discharge: HOME | End: 2020-12-09
Payer: OTHER GOVERNMENT

## 2020-12-09 VITALS
HEART RATE: 127 BPM | WEIGHT: 118 LBS | BODY MASS INDEX: 16.89 KG/M2 | TEMPERATURE: 97.3 F | SYSTOLIC BLOOD PRESSURE: 90 MMHG | HEIGHT: 70 IN | OXYGEN SATURATION: 97 % | DIASTOLIC BLOOD PRESSURE: 62 MMHG

## 2020-12-09 DIAGNOSIS — C15.9 ESOPHAGEAL ADENOCARCINOMA (HCC): ICD-10-CM

## 2020-12-09 DIAGNOSIS — R97.0 ELEVATED CEA: ICD-10-CM

## 2020-12-09 LAB
ALBUMIN SERPL-MCNC: 3.7 G/DL (ref 3.5–5.2)
ALP BLD-CCNC: 1700 U/L (ref 40–130)
ALT SERPL-CCNC: 154 U/L (ref 21–72)
ANION GAP SERPL CALCULATED.3IONS-SCNC: 12 MMOL/L (ref 7–19)
AST SERPL-CCNC: 318 U/L (ref 17–59)
BASOPHILS ABSOLUTE: 0.03 K/UL (ref 0.01–0.08)
BASOPHILS RELATIVE PERCENT: 0.3 % (ref 0.1–1.2)
BILIRUB SERPL-MCNC: 4.5 MG/DL (ref 0.2–1.3)
BUN BLDV-MCNC: 31 MG/DL (ref 9–20)
CALCIUM SERPL-MCNC: 12 MG/DL (ref 8.4–10.2)
CEA: 233 NG/ML (ref 0–3)
CHLORIDE BLD-SCNC: 88 MMOL/L (ref 98–111)
CO2: 32 MMOL/L (ref 22–29)
CREAT SERPL-MCNC: 0.8 MG/DL (ref 0.6–1.2)
EOSINOPHILS ABSOLUTE: 0.09 K/UL (ref 0.04–0.54)
EOSINOPHILS RELATIVE PERCENT: 0.9 % (ref 0.7–7)
GFR NON-AFRICAN AMERICAN: >60
GLOBULIN: 2.8 G/DL
GLUCOSE BLD-MCNC: 105 MG/DL (ref 74–106)
HCT VFR BLD CALC: 44.4 % (ref 40.1–51)
HEMOGLOBIN: 15.4 G/DL (ref 13.7–17.5)
LYMPHOCYTES ABSOLUTE: 0.77 K/UL (ref 1.18–3.74)
LYMPHOCYTES RELATIVE PERCENT: 7.8 % (ref 19.3–53.1)
MCH RBC QN AUTO: 33.2 PG (ref 25.7–32.2)
MCHC RBC AUTO-ENTMCNC: 34.7 G/DL (ref 32.3–36.5)
MCV RBC AUTO: 95.7 FL (ref 79–92.2)
MONOCYTES ABSOLUTE: 1.06 K/UL (ref 0.24–0.82)
MONOCYTES RELATIVE PERCENT: 10.8 % (ref 4.7–12.5)
NEUTROPHILS ABSOLUTE: 7.9 K/UL (ref 1.56–6.13)
NEUTROPHILS RELATIVE PERCENT: 80.2 % (ref 34–71.1)
PDW BLD-RTO: 14.5 % (ref 11.6–14.4)
PLATELET # BLD: 191 K/UL (ref 163–337)
PMV BLD AUTO: 10.1 FL (ref 7.4–10.4)
POTASSIUM SERPL-SCNC: 5.7 MMOL/L (ref 3.5–5.1)
RBC # BLD: 4.64 M/UL (ref 4.63–6.08)
SODIUM BLD-SCNC: 132 MMOL/L (ref 137–145)
TOTAL PROTEIN: 6.5 G/DL (ref 6.3–8.2)
WBC # BLD: 9.85 K/UL (ref 4.23–9.07)

## 2020-12-09 PROCEDURE — 85025 COMPLETE CBC W/AUTO DIFF WBC: CPT

## 2020-12-09 PROCEDURE — 99212 OFFICE O/P EST SF 10 MIN: CPT

## 2020-12-09 PROCEDURE — 82378 CARCINOEMBRYONIC ANTIGEN: CPT

## 2020-12-09 PROCEDURE — 80053 COMPREHEN METABOLIC PANEL: CPT

## 2020-12-09 PROCEDURE — 99215 OFFICE O/P EST HI 40 MIN: CPT | Performed by: NURSE PRACTITIONER

## 2020-12-09 RX ORDER — PROMETHAZINE HYDROCHLORIDE 12.5 MG/1
12.5 TABLET ORAL 4 TIMES DAILY PRN
Qty: 30 TABLET | Refills: 1 | Status: SHIPPED | OUTPATIENT
Start: 2020-12-09

## 2020-12-09 RX ORDER — ONDANSETRON 4 MG/1
4 TABLET, FILM COATED ORAL 3 TIMES DAILY PRN
Qty: 30 TABLET | Refills: 1 | Status: SHIPPED | OUTPATIENT
Start: 2020-12-09

## 2020-12-09 RX ORDER — MORPHINE SULFATE 15 MG/1
15 TABLET, FILM COATED, EXTENDED RELEASE ORAL 2 TIMES DAILY
Qty: 60 TABLET | Refills: 0 | Status: SHIPPED | OUTPATIENT
Start: 2020-12-09 | End: 2021-01-08

## 2020-12-09 ASSESSMENT — ENCOUNTER SYMPTOMS
NAUSEA: 1
WHEEZING: 0
BACK PAIN: 1
SHORTNESS OF BREATH: 0
SORE THROAT: 0
CONSTIPATION: 0
ABDOMINAL PAIN: 0
TROUBLE SWALLOWING: 1
EYE DISCHARGE: 0
DIARRHEA: 0
COUGH: 0
EYE ITCHING: 0
VOMITING: 0

## 2020-12-09 NOTE — PROGRESS NOTES
Progress Note      Pt Name: Daya Simms  YOB: 1970  MRN: [id number]    Date of evaluation: 12/9/2020  History Obtained From:  Patient, EMR    Chief Complaint   Patient presents with    Follow-up     Esophageal adenocarcinoma ([facility] [address]       HISTORY OF PRESENT ILLNESS:    Daya Simms is a 54 y.o. male managed with primary and secondary diagnoses as outlined:  · Adenocarcinoma of the distal esophagus made by endoscopy on 9/27/2019. 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 XRT was completed 12/24/2019 for 1228 treatment fractions.     On 3/3/2020 Dr Rah Turner a Bronchoscopy,EGD,Exploratory laparotomy, Right thoracotomy with mobilization of the esophagus, Ligation of the thoracic duct, Three-field esophagectomy with cervical anastomosis, Pyloroplasty, and Jejunostomy feeding tube placement.     Dalton Zaragoza returned to the clinic 11/12/2020 for routine follow-up, having presented with nausea, abdominal pain, early satiety, weight loss and back pain.     Labs 11/12/2020 included an elevated CEA of 81.5 and elevated LFTs. CT scans of the chest, abdomen/pelvis 11/19/2020 were consistent with evidence of widespread metastatic disease including lymph nodes, liver and bones.     He returns to the clinic today, to discuss completion of the metastatic work-up as well as biopsy of a left neck mass. Dalton Zaragoza is accompanied by his wife, Tamera Gross. He was prescribed Percocet 7.5 mg every 6 hours PRN pain. Pain is unrelieved. He has poor oral intake due to fear of nausea. Dalton Zaragoza is clearly malnourished, emaciated, underweight. He feels he needs a feeding tube.         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 Kirby Graff was seen in initial oncology consultation on 10/9/2019 with a new diagnosis of adenocarcinoma of the distal esophagus referred by Dr. Jono Muñiz for further work-up, care coordination and treatment decisions.     Ritu Carranza states that 20 years ago he had an episode when a large piece of chicken got stuck in his throat requiring removal.  Since that time he has had occasional dysphasia problems but nothing significant.  When symptoms began several weeks prior to presentation, he initially thought it was something similar and for that reason did not seek immediate evaluation.     On 9/18/2019, Ritu Carranza presented to [facility] ED with a complaint of 3 to 4 weeks of difficulty swallowing and approximately 20 pound weight loss reported. He was seen in the emergency room by Lindsey Ramos PA-C working with Dr. Mary Armendariz. 3 weeks prior to presentation he began feeling pain with swallowing solid foods.  He switched to a soft food diet.  He noted that he was still able to tolerate liquids and denied vomiting.  He stated that with swallowing however the food seemed to get caught in his mid chest area.  After instructions on diet etc., he was encouraged to follow-up with a PCP and consideration for GI follow-up as well thereafter.  A list of physicians was provided for him to review and pick a provider.     Ritu Carranza was evaluated by Keith HUANG on 9/25/2019 reporting that by this time he was already having problems with liquids in addition to the solid and soft foods previously described at the [facility] ED. A referral was made for GI evaluation.     Mr. Van was seen by REINA El with Dr. Jono Muñiz at the GI clinic on 9/26/2019 for evaluation of the above complaints.  Arrangements were made for an EGD.     On 9/27/2019 Dr. Jono Muñiz at 34 cm, consistent with the report by Dr. Salvador Turner. · Switching to a  endoscope, Dr. Eva Muñiz was unable to pass the mass and he again switched to an adult scope. · An ERCP wire was passed and documented to have coiled in the stomach under fluoroscopic evaluation. · An 18 mm biliary extraction balloon was passed over the wire, contrast was injected to determine the distal anatomy, and this was confirmed to have been in the stomach.  The balloon was inflated and withdrawn slowly to identify the GE junction as well as the distal and proximal extents of the tumor.  All of these processes were photographed with fluoroscopy. · Dr. Eva Muñiz felt that the stricture was 3-4 cm in longitudinal distance. Procedure:  A 23mm x 12.5 cm fully covered self-expanding metal esophageal Wallstent was advanced and stented across the stricture under fluoroscopic guidance.  It appeared to be in proper position. A  scope was again read-passed down into the esophagus after several minutes.  Dr. Eva Muñiz was still unable to pass the  scope through the deployed stent signifying the severity of the stricture. [place] Gentleman was a large amount of fluid emanating from the stomach which was suctioned through the scope.  Photographs were obtained.     On 10/9/2019 Mr. Van was initially evaluated in this office.  His examination did not disclose palpable supraclavicular lymphadenopathy nor acute or worrisome abdominal findings related to the above diagnosis.     Serology on 10/9/2019 documented a normal CEA of 2.6 with an elevated CA 19-9 of 72.2     PET/CT on 10/14/2019 documented abnormal FDG uptake in the mid to distal esophagus with an SUV of 10.63 compatible with the diagnosis of esophageal adenocarcinoma  Notation was made of the fact that the esophageal stent was located completely within the stomach.     Radiation oncology consultation by Dr. Shreya Camarillo and the Chris Nash PA-C was performed on 10/23/2019.   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 The plan was discussed with Dr. Melida Zamorano with plans for neoadjuvant concurrent XRT with weekly Carboplatin and Taxol chemotherapy based on the  [facility] experience.     Mr. Van was evaluated by Dr. Randal Reed at [facility] in [place] on 10/31/2019.    Dr. Elmer Balderrama assessment and recommendation was to proceed with neoadjuvant chemotherapy and XRT to be followed by surgical resection. Monse Tolentino desires to see Mr. Van upon completion of neoadjuvant therapy.     Results:  · Serology for Guardant 360 molecular markers 10/9/2019 documented NO tumor-related somatic alterations  · [facility], PDL 1 and MSI documented the following: PD-L1 positive, 20%. ERBB2 (Her2/Antoine) negative, 0. Mismatch Repair Status, proficient. · Consideration may also need to be given for MRI of the abdomen with and without contrast to further evaluate the liver.  Will await surgical opinion by Dr. Randal Reed at [facility].     Weekly Carboplatin at an AUC of 2 with Taxol 50 mg/m² initiated 10/28/2019 and the final cycle #9 delivered on 12/30/2019     XRT was initiated on 11/12/2019 He completed 28 treatment fractions on 12/24/2019.      CT chest, abdomen with contrast at [facility] on 1/24/2020 documented:  · Patient's known esophageal adenocarcinoma somewhat poorly defined, but subjectively appears decreased in size since 9/27/2019.   · Slight decrease in size of periesophageal lymph node  · No evidence of new or worsening disease in chest  · No findings to suggest metastatic disease in abdomen      On 3/3/2020 Dr Randal Reed performed the following procedures at [facility]:  1. Bronchoscopy  2. EGD  3. Exploratory laparotomy   4. Right thoracotomy with mobilization of the esophagus  5. Ligation of the thoracic duct  6. Three-field esophagectomy with cervical anastomosis   7. Pyloroplasty  8. Jejunostomy feeding tube     Operative findings:  · Normal endobronchial anatomy  · Near complete obstruction at 32 cm   · Willingham's esophagus extending up to 27 cm  · No evidence of cancer or mass on retroflexion in the stomach  · Esophagus extremely stuck to the mediastinum and no clear planes were present near the tumor     Pathology revealed the following:     Esophagus, esophagectomy:   · Minimal residual invasive adenocarcinoma (0.2 cm)  · All margins negative for carcinoma   · Treatment effect present   · 18 lymph nodes, negative for carcinoma       Lymph node, level 7, excision:  · One lymph node, negative for carcinoma  Bone. Sixth rib right, excision:  · Bone and marrow elements with no significant histopathologic change     On 3/7/2020 Dr. Lupillo Vasquez performed a bronchoscopy with bronchoalveolar lavage and found a large mucous plug occluding the left mainstem bronchus. Irrigation and suction performed and irrigant sent for culture. Culture revealed:  1. MRSA  2. E-coli  3. Candida albicans  4. AFB showed no growth at 6 weeks     On 3/10/2020 Lupillo Vasquez performed a bronchoscopy which revealed a large mucous plug including the left mainstem bronchus. This was completely removed and irrigated. No evidence of plugging on the right.      He was treated at [facility] with vancomycin for the MRSA while hospitalized.   Azul Kim is pleased with the outcome of surgery and the results of pathology. He does not desire further chemotherapy.   No further chemotherapy will be recommended.      RECURRENCE: Stage IV, recurrent widely metastatic esophageal adenocarcinoma involving lymph nodes, liver and bones, 11/30/2020    Nate Alberto was evaluated 11/12/2020, with nausea, weight loss, abdominal and back pain. CEA was 81.5 on 11/12/2020. Contrasted chest CT 11/19/2020 at [facility]:  · new subcentimeter ill-defined pulmonary nodules bilaterally  · new 2.8 x 2.2 cm mass in the left proximal paratracheal area below the level of the thyroid that encases the left common carotid and proximal part of the left subclavian and proximal left vertebral artery  · Ill-defined density/debris in the distal trachea and proximal mainstem bronchi, possibly representing retained mucus or aspirated material  · Unchanged, ill-defined area of bony sclerosis in the posterior part of the vertebra T2     Contrasted abdominal/pelvic CT 11/19/2020 at [facility]:  · Too numerous to count hypoenhancing/cystic liver lesions, left greater than right suggestive of metastatic disease  · Moderate right hydroureteronephrosis  · 1.4 cm rounded enhancing nodules along the right pelvic sidewall in the region of the right distal ureter with appearance of an obstructing metastatic lesion along the distal ureter, adenopathy or soft tissue implant    PET scan 11/30/2020 at [address]:  · Extensive liver metastasis, the largest lesion in the left lobe, SUV 13.89  · Lesion in the superior mediastinum to the left of the trachea, SUV 6.28  · Subcarinal middle mediastinal lymph node, 3.32  · Extensive hypermetabolic osseous metastasis, too many to count though includes the sternum, right scapula and proximal humerus, vertebral body lesions, rib lesions, pelvic bones. The largest noted in the right ilium, SUV 9.65    Ultrasound-guided FNA left neck mass 11/30/2020 was consistent with metastatic esophageal adenocarcinoma on pathology. Findings were discussed with Osmani Paz and his wife, Andrew Pratt at follow-up on 12/9/2020. He is aware he has recurrent, stage IV, metastatic esophageal adenocarcinoma involving lymph nodes, liver and bones. He desires to proceed with palliative systemic chemotherapy.     Consultation is placed to Dr. Mary Stuart for [facility] placement as well as G-tube or J-tube for malnutrition related to cancer. Anticipation is for palliative immunotherapy with pembrolizumab (Keytruda) 200 mg IV every 3 weeks.     TREATMENT SUMMARY:  1. Weekly Carboplatin at an AUC of 2 with Taxol 50 mg/m² initiated 10/28/2019 and the final cycle #9 delivered on 12/30/2019  2. XRT was initiated on 11/12/2019, he completed 28 treatment fractions on 12/24/2019.  3. On 3/3/2020 Dr Zaid benites Bronchoscopy, EGD, Exploratory laparotomy, Right thoracotomy with mobilization of the esophagus, Ligation of the thoracic duct, Three-field esophagectomy with cervical anastomosis, Pyloroplasty, and Jejunostomy feeding tube placement  4.  Anticipate pembrolizumab (Keytruda) 200 mg IV every 3 weeks    Past Medical History:   Diagnosis Date    Esophageal mass     GERD (gastroesophageal reflux disease)     Hx of blood clots     DVT post op    Malignant neoplasm of esophagus, unspecified (HCC)     Esophageal Adenocarcinoma    PTSD (post-traumatic stress disorder)     no diagnosed     Past Surgical History:   Procedure Laterality Date    ABDOMINAL EXPLORATION SURGERY      ANTERIOR CRUCIATE LIGAMENT REPAIR Right     x 2    ERCP  10/21/2019    Dr AMADOU Garcia/placement of a fully covered SEMS 23mm x 12.cm esophageal stent    ESOPHAGECTOMY      OTHER SURGICAL HISTORY      feeding tube placement    THORACOTOMY      UPPER GASTROINTESTINAL ENDOSCOPY N/A 10/2/2019    Dr AMADOU Garcia/Successful placement of a 23 mm x 12.5 cm fully covered self-expanding esophageal Wallstent under fluoroscopic guidance    UPPER GASTROINTESTINAL ENDOSCOPY N/A 10/21/2019    Dr AMADOU Garcia/Successful placement of a 23 mm x 12.5 cm fully covered self-expanding esophageal Wallstent under fluoroscopic guidance    UPPER GASTROINTESTINAL ENDOSCOPY  10/21/2019    Dr AMADOU Garcia/Successful placement of a 23 mm x 12.5 cm fully covered self-expanding esophageal Wallstent under fluoroscopic guidance    UPPER GASTROINTESTINAL ENDOSCOPY N/A 10/28/2019    Dr Tyshawn Wild-w/Removal of previously migrated esophageal stent x2     Current Outpatient Medications   Medication Sig Dispense Refill    ondansetron (ZOFRAN) 4 MG tablet Take 1 tablet by mouth 3 times daily as needed for Nausea or Vomiting 30 tablet 1    promethazine (PHENERGAN) 12.5 MG tablet Take 1 tablet by mouth 4 times daily as needed for Nausea 30 tablet 1    morphine (MS CONTIN) 15 MG extended release tablet Take 1 tablet by mouth 2 times daily for 30 days. 60 tablet 0    oxyCODONE-acetaminophen (PERCOCET) 7.5-325 MG per tablet Take 1 tablet by mouth every 6 hours as needed for Pain for up to 30 days. Intended supply: 30 days 120 tablet 0    aspirin 81 MG EC tablet Take 81 mg by mouth daily       No current facility-administered medications for this visit. Allergies   Allergen Reactions    Eggs Or Egg-Derived Products Swelling     Social History     Tobacco Use    Smoking status: Current Every Day Smoker     Packs/day: 0.50     Years: 34.00     Pack years: 17.00    Smokeless tobacco: Current User   Substance Use Topics    Alcohol use: Never     Frequency: Never    Drug use: Never     Family History   Problem Relation Age of Onset    Lung Cancer Father 40       Review of Systems   Constitutional: Positive for activity change, appetite change, fatigue and unexpected weight change (weight loss). Negative for fever. No night sweats   HENT: Positive for trouble swallowing. Negative for dental problem, hearing loss, mouth sores, nosebleeds and sore throat. Eyes: Negative for discharge and itching. Respiratory: Negative for cough, shortness of breath and wheezing. No hemoptysis   Cardiovascular: Negative for chest pain, palpitations and leg swelling. Gastrointestinal: Positive for nausea. Negative for abdominal pain, constipation, diarrhea and vomiting.         Early satiety   Endocrine: Negative for cold intolerance and heat No supraclavicular adenopathy. Comments: No bulky palpable cervical, clavicular, axillary or inguinal adenopathies on the left or right. Skin:     General: Skin is warm and dry. Findings: No rash. Neurological:      Mental Status: He is alert and oriented to person, place, and time. Comments: follows commands, non-focal   Psychiatric:         Behavior: Behavior normal. Behavior is cooperative. Thought Content: Thought content normal.         Judgment: Judgment normal.      Comments: Alert and oriented to person, place and time. Vitals:    12/09/20 1207   BP: 90/62   Pulse: 127   Temp: 97.3 °F (36.3 °C)   TempSrc: Temporal   SpO2: 97%   Weight: 118 lb (53.5 kg)   Height: 5' 10\" (1.778 m)      Wt Readings from Last 3 Encounters:   12/09/20 118 lb (53.5 kg)   11/12/20 118 lb (53.5 kg)   07/20/20 123 lb (55.8 kg)       LABS:  CBC 12/9/2020: WBC 9.85, Hgb 15.4/MCV 95.7, platelets 711,048    ASSESSMENT/PLAN:    1. Esophageal adenocarcinoma ([address]    2. Liver metastasis ([address]    3. Bone metastases (HCC)    4. Elevated CEA    5. Cancer associated pain    6. Hydronephrosis due to obstruction of ureter    7. Abnormal weight loss    8.  Severe protein-calorie malnutrition ([address]       H/O Resected, distal esophageal adenocarcinoma  Diagnosis made via endoscopy 9/27/2019  S/p 9 cycles of neoadjuvant weekly carboplatin/Taxol delivered 10/28/2019-12/30/2019.  28 fractions of XRT was completed 12/24/2019.     On 3/3/2020 Dr Juan F Álvarez a Bronchoscopy, EGD, Exploratory laparotomy, Right thoracotomy with mobilization of the esophagus, Ligation of the thoracic duct, Three-field esophagectomy with cervical anastomosis, Pyloroplasty, and Jejunostomy feeding tube placement.     New onset nausea, weight loss, back pain/flank pain, generalized weakness     Labs 11/12/2020:  · CBC: WBC 8.86, Hgb 16.9/MCV 97.7, platelets 081,710  · CMP: Creatinine 1.14, GFR 76, alkaline phosphatase 313, ALT 47, AST disease. Treatment will be aimed at palliation. · Serology for Guardant 360 molecular markers 10/9/2019 documented NO tumor-related somatic alterations  · Path specimen from 9/27/2019 was sent to [facility] for NGS, PDL 1 and MSI documented the following: PD-L1 positive, 20%. ERBB2 (Her2/Antoine) negative, 0. Mismatch Repair Status, proficient. Recommendation is for palliative immunotherapy with pembrolizumab (Keytruda) 200 mg IV every 3 weeks. He desires to proceed. Consultation is placed to Dr. Tameka Muñoz for Mediport placement and G-tube or J-tube regarding need for chemotherapy and malnutrition secondary to esophageal adenocarcinoma. CMP and CEA will be repeated for new baseline. Recommendation is for systemic treatment with Keytruda every 3 weeks. Rx MS Contin 15 mg every 12 hours for pain provided. Continue Percocet every 6 hours PRN breakthrough pain. Rx Zofran    Orders Placed This Encounter   Procedures    Comprehensive Metabolic Panel    CEA    Christianne Parrish MD, General Surgery, [place]     Orders Placed This Encounter   Medications    ondansetron (ZOFRAN) 4 MG tablet     Sig: Take 1 tablet by mouth 3 times daily as needed for Nausea or Vomiting     Dispense:  30 tablet     Refill:  1    promethazine (PHENERGAN) 12.5 MG tablet     Sig: Take 1 tablet by mouth 4 times daily as needed for Nausea     Dispense:  30 tablet     Refill:  1       Return in about 13 days (around 12/22/2020) for follow up with REINA Martins for Mare Villegas and Shahid Velasco. The selection, dosing administration of anticancer agents in the management of associated toxicities are complex. Modifications of drug dose and schedule and the initiation of supportive care interventions are often necessary because of expected toxicities individual patient variability, prior treatment and comorbidity.  The optimal delivery of anticancer agents therefore requires a healthcare delivery team experienced in the use of anticancer agents and the management of associated toxicities in patients with cancer. The patient was counseled today about diagnosis, staging, prognosis, diagnostic tests, medications, side effects and disease management. The method of counseling included verbal explanation. The patient verbalized understanding. Complex decision making. I have seen, examined and reviewed patient medication list, appropriate labs and imaging studies. Relevant medical records and other physician notes have been reviewed. I answered all questions to the best of my knowledge and to the patient's satisfaction. Dictated utilizing Dragon transcription software.          REINA Pittman  3:40 PM  12/10/2020

## 2020-12-10 ENCOUNTER — CLINICAL DOCUMENTATION (OUTPATIENT)
Dept: HEMATOLOGY | Age: 50
End: 2020-12-10

## 2020-12-10 NOTE — PROGRESS NOTES
stage IV esophageal adenocarcinoma including lymph nodes, liver and bones     PD-L1 on 9/27/2019 specimen was positive, 20%. Please build regimen for palliative Keytruda 200 mg IV every 3 weeks. Please get insurance authorization.     Thanks,  Nydia Aggarwal, APRN

## 2020-12-10 NOTE — PROGRESS NOTES
CMP from yesterday, 12/9/2020 received, reviewed. Findings include potassium 5.7, calcium 12.0, total bilirubin 4.5, LFTs elevated    Discussed with Dr. Madan Olivarez who recommended patient go to the ER for evaluation. Attempted to call Osmani Paz, phone number listed went to voicemail which was full. Unable to leave message. We will continue to try to reach Osmani Paz.     REINA Weiss  12/10/20  11:26 AM

## 2020-12-10 NOTE — PROGRESS NOTES
I was unable to reach Rob on second attempt phone call. Moi's wife, Samantha Junior was contacted and informed regarding lab findings with recommendations to go to the ER per Dr. Mary Ann Franco. She is agreeable to discuss with Rob and take him to the emergency room. All questions were answered.     Rolandaman Patient, APRN  12/10/20  1:35 PM

## 2020-12-15 ENCOUNTER — HOSPITAL ENCOUNTER (INPATIENT)
Age: 50
LOS: 2 days | Discharge: HOSPICE | DRG: 314 | End: 2020-12-17
Attending: EMERGENCY MEDICINE | Admitting: HOSPITALIST
Payer: OTHER GOVERNMENT

## 2020-12-15 ENCOUNTER — OFFICE VISIT (OUTPATIENT)
Dept: SURGERY | Age: 50
End: 2020-12-15

## 2020-12-15 ENCOUNTER — APPOINTMENT (OUTPATIENT)
Dept: GENERAL RADIOLOGY | Age: 50
DRG: 314 | End: 2020-12-15
Payer: OTHER GOVERNMENT

## 2020-12-15 VITALS
TEMPERATURE: 97.5 F | BODY MASS INDEX: 16.46 KG/M2 | OXYGEN SATURATION: 95 % | HEIGHT: 70 IN | HEART RATE: 122 BPM | WEIGHT: 115 LBS

## 2020-12-15 PROBLEM — Z92.3 HISTORY OF RADIATION THERAPY: Status: ACTIVE | Noted: 2020-05-26

## 2020-12-15 PROBLEM — J98.11 ATELECTASIS: Status: ACTIVE | Noted: 2020-01-31

## 2020-12-15 PROBLEM — J45.909 ASTHMA: Status: ACTIVE | Noted: 2020-12-15

## 2020-12-15 PROBLEM — Z72.0 CURRENT TOBACCO USE: Status: ACTIVE | Noted: 2019-10-21

## 2020-12-15 PROBLEM — I95.9 HYPOTENSION: Status: ACTIVE | Noted: 2020-12-15

## 2020-12-15 PROBLEM — E86.0 DEHYDRATION: Status: ACTIVE | Noted: 2020-12-15

## 2020-12-15 PROBLEM — E87.1 HYPONATREMIA: Status: ACTIVE | Noted: 2020-12-15

## 2020-12-15 PROBLEM — K21.9 GERD (GASTROESOPHAGEAL REFLUX DISEASE): Status: ACTIVE | Noted: 2020-12-15

## 2020-12-15 PROBLEM — E83.52 HYPERCALCEMIA: Status: ACTIVE | Noted: 2020-12-15

## 2020-12-15 PROBLEM — E87.5 HYPERKALEMIA: Status: ACTIVE | Noted: 2020-12-15

## 2020-12-15 LAB
ALBUMIN SERPL-MCNC: 3 G/DL (ref 3.5–5.2)
ALP BLD-CCNC: 1428 U/L (ref 40–130)
ALT SERPL-CCNC: 167 U/L (ref 5–41)
AMMONIA: 62 UMOL/L (ref 16–60)
ANION GAP SERPL CALCULATED.3IONS-SCNC: 15 MMOL/L (ref 7–19)
AST SERPL-CCNC: 315 U/L (ref 5–40)
BACTERIA: ABNORMAL /HPF
BASOPHILS ABSOLUTE: 0 K/UL (ref 0–0.2)
BASOPHILS RELATIVE PERCENT: 0.4 % (ref 0–1)
BILIRUB SERPL-MCNC: 6.8 MG/DL (ref 0.2–1.2)
BILIRUBIN URINE: ABNORMAL
BLOOD, URINE: NEGATIVE
BUN BLDV-MCNC: 71 MG/DL (ref 6–20)
CALCIUM IONIZED: 1.57 MMOL/L (ref 1.12–1.32)
CALCIUM SERPL-MCNC: 12.6 MG/DL (ref 8.6–10)
CHLORIDE BLD-SCNC: 87 MMOL/L (ref 98–111)
CHP ED QC CHECK: NORMAL
CLARITY: CLEAR
CO2: 27 MMOL/L (ref 22–29)
COLOR: ABNORMAL
CREAT SERPL-MCNC: 1.2 MG/DL (ref 0.5–1.2)
EOSINOPHILS ABSOLUTE: 0 K/UL (ref 0–0.6)
EOSINOPHILS RELATIVE PERCENT: 0.4 % (ref 0–5)
EPITHELIAL CELLS, UA: ABNORMAL /HPF
GFR AFRICAN AMERICAN: >59
GFR NON-AFRICAN AMERICAN: >60
GLUCOSE BLD-MCNC: 105 MG/DL (ref 70–99)
GLUCOSE BLD-MCNC: 107 MG/DL (ref 74–109)
GLUCOSE BLD-MCNC: 357 MG/DL
GLUCOSE BLD-MCNC: 357 MG/DL (ref 70–99)
GLUCOSE URINE: NEGATIVE MG/DL
HCT VFR BLD CALC: 49.4 % (ref 42–52)
HEMOGLOBIN: 17.1 G/DL (ref 14–18)
IMMATURE GRANULOCYTES #: 0.1 K/UL
INR BLD: 2.1 (ref 0.88–1.18)
KETONES, URINE: ABNORMAL MG/DL
LEUKOCYTE ESTERASE, URINE: ABNORMAL
LIPASE: 12 U/L (ref 13–60)
LYMPHOCYTES ABSOLUTE: 0.7 K/UL (ref 1.1–4.5)
LYMPHOCYTES RELATIVE PERCENT: 7.7 % (ref 20–40)
MCH RBC QN AUTO: 32.4 PG (ref 27–31)
MCHC RBC AUTO-ENTMCNC: 34.6 G/DL (ref 33–37)
MCV RBC AUTO: 93.7 FL (ref 80–94)
MONOCYTES ABSOLUTE: 0.7 K/UL (ref 0–0.9)
MONOCYTES RELATIVE PERCENT: 7.8 % (ref 0–10)
NEUTROPHILS ABSOLUTE: 7 K/UL (ref 1.5–7.5)
NEUTROPHILS RELATIVE PERCENT: 83 % (ref 50–65)
NITRITE, URINE: POSITIVE
PDW BLD-RTO: 17.7 % (ref 11.5–14.5)
PERFORMED ON: ABNORMAL
PERFORMED ON: ABNORMAL
PH UA: 5 (ref 5–8)
PLATELET # BLD: 211 K/UL (ref 130–400)
PMV BLD AUTO: 10.7 FL (ref 9.4–12.4)
POTASSIUM SERPL-SCNC: 5.5 MMOL/L (ref 3.5–5)
POTASSIUM SERPL-SCNC: 6.1 MMOL/L (ref 3.5–5)
PROTEIN UA: NEGATIVE MG/DL
PROTHROMBIN TIME: 23.9 SEC (ref 12–14.6)
RBC # BLD: 5.27 M/UL (ref 4.7–6.1)
RBC UA: ABNORMAL /HPF (ref 0–2)
REASON FOR REJECTION: NORMAL
REJECTED TEST: NORMAL
SARS-COV-2, NAAT: NOT DETECTED
SODIUM BLD-SCNC: 129 MMOL/L (ref 136–145)
SPECIFIC GRAVITY UA: 1.02 (ref 1–1.03)
TOTAL PROTEIN: 5.7 G/DL (ref 6.6–8.7)
UROBILINOGEN, URINE: 2 E.U./DL
WAXY CASTS: ABNORMAL /LPF
WBC # BLD: 8.5 K/UL (ref 4.8–10.8)
WBC UA: ABNORMAL /HPF (ref 0–5)

## 2020-12-15 PROCEDURE — 81001 URINALYSIS AUTO W/SCOPE: CPT

## 2020-12-15 PROCEDURE — 84132 ASSAY OF SERUM POTASSIUM: CPT

## 2020-12-15 PROCEDURE — 2580000003 HC RX 258: Performed by: EMERGENCY MEDICINE

## 2020-12-15 PROCEDURE — 93005 ELECTROCARDIOGRAM TRACING: CPT | Performed by: EMERGENCY MEDICINE

## 2020-12-15 PROCEDURE — 99999 PR OFFICE/OUTPT VISIT,PROCEDURE ONLY: CPT | Performed by: EMERGENCY MEDICINE

## 2020-12-15 PROCEDURE — 82330 ASSAY OF CALCIUM: CPT

## 2020-12-15 PROCEDURE — 99999 PR OFFICE/OUTPT VISIT,PROCEDURE ONLY: CPT | Performed by: SURGERY

## 2020-12-15 PROCEDURE — 83690 ASSAY OF LIPASE: CPT

## 2020-12-15 PROCEDURE — 6360000002 HC RX W HCPCS: Performed by: EMERGENCY MEDICINE

## 2020-12-15 PROCEDURE — 96375 TX/PRO/DX INJ NEW DRUG ADDON: CPT

## 2020-12-15 PROCEDURE — 82947 ASSAY GLUCOSE BLOOD QUANT: CPT

## 2020-12-15 PROCEDURE — 6370000000 HC RX 637 (ALT 250 FOR IP): Performed by: EMERGENCY MEDICINE

## 2020-12-15 PROCEDURE — 80053 COMPREHEN METABOLIC PANEL: CPT

## 2020-12-15 PROCEDURE — 1210000000 HC MED SURG R&B

## 2020-12-15 PROCEDURE — 71045 X-RAY EXAM CHEST 1 VIEW: CPT

## 2020-12-15 PROCEDURE — 99284 EMERGENCY DEPT VISIT MOD MDM: CPT

## 2020-12-15 PROCEDURE — 82140 ASSAY OF AMMONIA: CPT

## 2020-12-15 PROCEDURE — 85025 COMPLETE CBC W/AUTO DIFF WBC: CPT

## 2020-12-15 PROCEDURE — 36415 COLL VENOUS BLD VENIPUNCTURE: CPT

## 2020-12-15 PROCEDURE — U0002 COVID-19 LAB TEST NON-CDC: HCPCS

## 2020-12-15 PROCEDURE — 85610 PROTHROMBIN TIME: CPT

## 2020-12-15 PROCEDURE — 2500000003 HC RX 250 WO HCPCS: Performed by: EMERGENCY MEDICINE

## 2020-12-15 PROCEDURE — 6370000000 HC RX 637 (ALT 250 FOR IP): Performed by: PHYSICIAN ASSISTANT

## 2020-12-15 PROCEDURE — 96374 THER/PROPH/DIAG INJ IV PUSH: CPT

## 2020-12-15 RX ORDER — ONDANSETRON 2 MG/ML
4 INJECTION INTRAMUSCULAR; INTRAVENOUS EVERY 6 HOURS PRN
Status: DISCONTINUED | OUTPATIENT
Start: 2020-12-15 | End: 2020-12-17 | Stop reason: HOSPADM

## 2020-12-15 RX ORDER — PROMETHAZINE HYDROCHLORIDE 25 MG/ML
12.5 INJECTION, SOLUTION INTRAMUSCULAR; INTRAVENOUS EVERY 6 HOURS PRN
Status: DISCONTINUED | OUTPATIENT
Start: 2020-12-15 | End: 2020-12-17 | Stop reason: HOSPADM

## 2020-12-15 RX ORDER — ACETAMINOPHEN 650 MG/1
650 SUPPOSITORY RECTAL EVERY 6 HOURS PRN
Status: DISCONTINUED | OUTPATIENT
Start: 2020-12-15 | End: 2020-12-17 | Stop reason: HOSPADM

## 2020-12-15 RX ORDER — DEXTROSE MONOHYDRATE 25 G/50ML
25 INJECTION, SOLUTION INTRAVENOUS ONCE
Status: COMPLETED | OUTPATIENT
Start: 2020-12-15 | End: 2020-12-15

## 2020-12-15 RX ORDER — MORPHINE SULFATE 4 MG/ML
2 INJECTION, SOLUTION INTRAMUSCULAR; INTRAVENOUS ONCE
Status: COMPLETED | OUTPATIENT
Start: 2020-12-15 | End: 2020-12-15

## 2020-12-15 RX ORDER — ASPIRIN 81 MG/1
81 TABLET ORAL DAILY
Status: DISCONTINUED | OUTPATIENT
Start: 2020-12-16 | End: 2020-12-17 | Stop reason: HOSPADM

## 2020-12-15 RX ORDER — MORPHINE SULFATE 15 MG/1
15 TABLET, FILM COATED, EXTENDED RELEASE ORAL 2 TIMES DAILY
Status: DISCONTINUED | OUTPATIENT
Start: 2020-12-15 | End: 2020-12-17 | Stop reason: HOSPADM

## 2020-12-15 RX ORDER — POLYETHYLENE GLYCOL 3350 17 G/17G
17 POWDER, FOR SOLUTION ORAL DAILY PRN
Status: DISCONTINUED | OUTPATIENT
Start: 2020-12-15 | End: 2020-12-17 | Stop reason: HOSPADM

## 2020-12-15 RX ORDER — SODIUM CHLORIDE 0.9 % (FLUSH) 0.9 %
10 SYRINGE (ML) INJECTION EVERY 12 HOURS SCHEDULED
Status: DISCONTINUED | OUTPATIENT
Start: 2020-12-15 | End: 2020-12-17 | Stop reason: HOSPADM

## 2020-12-15 RX ORDER — SODIUM CHLORIDE 0.9 % (FLUSH) 0.9 %
10 SYRINGE (ML) INJECTION PRN
Status: DISCONTINUED | OUTPATIENT
Start: 2020-12-15 | End: 2020-12-17 | Stop reason: HOSPADM

## 2020-12-15 RX ORDER — SODIUM CHLORIDE, SODIUM LACTATE, POTASSIUM CHLORIDE, CALCIUM CHLORIDE 600; 310; 30; 20 MG/100ML; MG/100ML; MG/100ML; MG/100ML
1000 INJECTION, SOLUTION INTRAVENOUS ONCE
Status: DISCONTINUED | OUTPATIENT
Start: 2020-12-15 | End: 2020-12-16

## 2020-12-15 RX ORDER — OXYCODONE HYDROCHLORIDE 5 MG/1
5 TABLET ORAL EVERY 6 HOURS PRN
Status: DISCONTINUED | OUTPATIENT
Start: 2020-12-15 | End: 2020-12-17 | Stop reason: HOSPADM

## 2020-12-15 RX ORDER — ACETAMINOPHEN 325 MG/1
650 TABLET ORAL EVERY 6 HOURS PRN
Status: DISCONTINUED | OUTPATIENT
Start: 2020-12-15 | End: 2020-12-17 | Stop reason: HOSPADM

## 2020-12-15 RX ORDER — NALOXONE HYDROCHLORIDE 0.4 MG/ML
0.4 INJECTION, SOLUTION INTRAMUSCULAR; INTRAVENOUS; SUBCUTANEOUS PRN
Status: DISCONTINUED | OUTPATIENT
Start: 2020-12-15 | End: 2020-12-17 | Stop reason: HOSPADM

## 2020-12-15 RX ADMIN — MORPHINE SULFATE 2 MG: 4 INJECTION, SOLUTION INTRAMUSCULAR; INTRAVENOUS at 19:20

## 2020-12-15 RX ADMIN — DEXTROSE MONOHYDRATE 25 G: 25 INJECTION, SOLUTION INTRAVENOUS at 19:25

## 2020-12-15 RX ADMIN — FOLIC ACID: 5 INJECTION, SOLUTION INTRAMUSCULAR; INTRAVENOUS; SUBCUTANEOUS at 20:08

## 2020-12-15 RX ADMIN — MORPHINE SULFATE 15 MG: 15 TABLET, FILM COATED, EXTENDED RELEASE ORAL at 23:55

## 2020-12-15 RX ADMIN — SODIUM BICARBONATE: 84 INJECTION, SOLUTION INTRAVENOUS at 20:08

## 2020-12-15 RX ADMIN — DEXTROSE MONOHYDRATE 25 G: 25 INJECTION, SOLUTION INTRAVENOUS at 19:21

## 2020-12-15 RX ADMIN — INSULIN HUMAN 10 UNITS: 100 INJECTION, SOLUTION PARENTERAL at 19:20

## 2020-12-15 ASSESSMENT — ENCOUNTER SYMPTOMS
VOMITING: 0
BACK PAIN: 1
SHORTNESS OF BREATH: 0
COUGH: 0

## 2020-12-15 ASSESSMENT — PAIN SCALES - GENERAL: PAINLEVEL_OUTOF10: 10

## 2020-12-16 ENCOUNTER — APPOINTMENT (OUTPATIENT)
Dept: ULTRASOUND IMAGING | Age: 50
DRG: 314 | End: 2020-12-16
Payer: OTHER GOVERNMENT

## 2020-12-16 PROBLEM — E43 SEVERE MALNUTRITION (HCC): Chronic | Status: ACTIVE | Noted: 2020-12-16

## 2020-12-16 PROBLEM — E43 PROTEIN-CALORIE MALNUTRITION, SEVERE (HCC): Status: ACTIVE | Noted: 2020-12-16

## 2020-12-16 LAB
ALBUMIN SERPL-MCNC: 3 G/DL (ref 3.5–5.2)
ALP BLD-CCNC: 1188 U/L (ref 40–130)
ALT SERPL-CCNC: 172 U/L (ref 5–41)
AMMONIA: 15 UMOL/L (ref 16–60)
ANION GAP SERPL CALCULATED.3IONS-SCNC: 12 MMOL/L (ref 7–19)
AST SERPL-CCNC: 337 U/L (ref 5–40)
BASOPHILS ABSOLUTE: 0 K/UL (ref 0–0.2)
BASOPHILS RELATIVE PERCENT: 0 % (ref 0–1)
BILIRUB SERPL-MCNC: 6.6 MG/DL (ref 0.2–1.2)
BUN BLDV-MCNC: 61 MG/DL (ref 6–20)
CALCIUM SERPL-MCNC: 12.1 MG/DL (ref 8.6–10)
CHLORIDE BLD-SCNC: 87 MMOL/L (ref 98–111)
CO2: 33 MMOL/L (ref 22–29)
CREAT SERPL-MCNC: 1 MG/DL (ref 0.5–1.2)
EKG P AXIS: 81 DEGREES
EKG P-R INTERVAL: 136 MS
EKG Q-T INTERVAL: 282 MS
EKG QRS DURATION: 66 MS
EKG QTC CALCULATION (BAZETT): 378 MS
EKG T AXIS: 79 DEGREES
EOSINOPHILS ABSOLUTE: 0.1 K/UL (ref 0–0.6)
EOSINOPHILS RELATIVE PERCENT: 1.3 % (ref 0–5)
GFR AFRICAN AMERICAN: >59
GFR NON-AFRICAN AMERICAN: >60
GLUCOSE BLD-MCNC: 100 MG/DL (ref 70–99)
GLUCOSE BLD-MCNC: 114 MG/DL (ref 74–109)
GLUCOSE BLD-MCNC: 132 MG/DL (ref 70–99)
GLUCOSE BLD-MCNC: 96 MG/DL (ref 70–99)
GLUCOSE BLD-MCNC: 98 MG/DL (ref 70–99)
HBA1C MFR BLD: 5.6 % (ref 4–6)
HCT VFR BLD CALC: 40.2 % (ref 42–52)
HEMOGLOBIN: 13.8 G/DL (ref 14–18)
IMMATURE GRANULOCYTES #: 0.1 K/UL
INR BLD: 1.62 (ref 0.88–1.18)
LYMPHOCYTES ABSOLUTE: 0.7 K/UL (ref 1.1–4.5)
LYMPHOCYTES RELATIVE PERCENT: 10.4 % (ref 20–40)
MCH RBC QN AUTO: 32.2 PG (ref 27–31)
MCHC RBC AUTO-ENTMCNC: 34.3 G/DL (ref 33–37)
MCV RBC AUTO: 93.7 FL (ref 80–94)
MONOCYTES ABSOLUTE: 0.7 K/UL (ref 0–0.9)
MONOCYTES RELATIVE PERCENT: 9.7 % (ref 0–10)
NEUTROPHILS ABSOLUTE: 5.5 K/UL (ref 1.5–7.5)
NEUTROPHILS RELATIVE PERCENT: 77.9 % (ref 50–65)
PDW BLD-RTO: 16.8 % (ref 11.5–14.5)
PERFORMED ON: ABNORMAL
PERFORMED ON: ABNORMAL
PERFORMED ON: NORMAL
PERFORMED ON: NORMAL
PLATELET # BLD: 140 K/UL (ref 130–400)
PLATELET SLIDE REVIEW: ABNORMAL
PMV BLD AUTO: 10.8 FL (ref 9.4–12.4)
POTASSIUM REFLEX MAGNESIUM: 5 MMOL/L (ref 3.5–5)
PROTHROMBIN TIME: 19.4 SEC (ref 12–14.6)
RBC # BLD: 4.29 M/UL (ref 4.7–6.1)
SODIUM BLD-SCNC: 132 MMOL/L (ref 136–145)
TOTAL PROTEIN: 5 G/DL (ref 6.6–8.7)
WBC # BLD: 7.1 K/UL (ref 4.8–10.8)

## 2020-12-16 PROCEDURE — 1210000000 HC MED SURG R&B

## 2020-12-16 PROCEDURE — 82947 ASSAY GLUCOSE BLOOD QUANT: CPT

## 2020-12-16 PROCEDURE — 6370000000 HC RX 637 (ALT 250 FOR IP): Performed by: PHYSICIAN ASSISTANT

## 2020-12-16 PROCEDURE — 93010 ELECTROCARDIOGRAM REPORT: CPT | Performed by: INTERNAL MEDICINE

## 2020-12-16 PROCEDURE — 2580000003 HC RX 258: Performed by: HOSPITALIST

## 2020-12-16 PROCEDURE — 99222 1ST HOSP IP/OBS MODERATE 55: CPT | Performed by: INTERNAL MEDICINE

## 2020-12-16 PROCEDURE — 82140 ASSAY OF AMMONIA: CPT

## 2020-12-16 PROCEDURE — 6360000002 HC RX W HCPCS: Performed by: STUDENT IN AN ORGANIZED HEALTH CARE EDUCATION/TRAINING PROGRAM

## 2020-12-16 PROCEDURE — 80053 COMPREHEN METABOLIC PANEL: CPT

## 2020-12-16 PROCEDURE — 6360000002 HC RX W HCPCS: Performed by: HOSPITALIST

## 2020-12-16 PROCEDURE — 76705 ECHO EXAM OF ABDOMEN: CPT

## 2020-12-16 PROCEDURE — 85610 PROTHROMBIN TIME: CPT

## 2020-12-16 PROCEDURE — 36415 COLL VENOUS BLD VENIPUNCTURE: CPT

## 2020-12-16 PROCEDURE — 85025 COMPLETE CBC W/AUTO DIFF WBC: CPT

## 2020-12-16 PROCEDURE — 83036 HEMOGLOBIN GLYCOSYLATED A1C: CPT

## 2020-12-16 PROCEDURE — 2580000003 HC RX 258: Performed by: STUDENT IN AN ORGANIZED HEALTH CARE EDUCATION/TRAINING PROGRAM

## 2020-12-16 RX ORDER — SODIUM CHLORIDE 9 MG/ML
INJECTION, SOLUTION INTRAVENOUS CONTINUOUS
Status: DISCONTINUED | OUTPATIENT
Start: 2020-12-16 | End: 2020-12-17 | Stop reason: HOSPADM

## 2020-12-16 RX ORDER — ZOLEDRONIC ACID 0.04 MG/ML
4 INJECTION, SOLUTION INTRAVENOUS ONCE
Status: DISCONTINUED | OUTPATIENT
Start: 2020-12-16 | End: 2020-12-16 | Stop reason: SDUPTHER

## 2020-12-16 RX ORDER — ZOLEDRONIC ACID 5 MG/100ML
5 INJECTION, SOLUTION INTRAVENOUS ONCE
Status: DISCONTINUED | OUTPATIENT
Start: 2020-12-16 | End: 2020-12-16

## 2020-12-16 RX ORDER — DEXTROSE MONOHYDRATE 50 MG/ML
100 INJECTION, SOLUTION INTRAVENOUS PRN
Status: DISCONTINUED | OUTPATIENT
Start: 2020-12-16 | End: 2020-12-17 | Stop reason: HOSPADM

## 2020-12-16 RX ORDER — DEXTROSE MONOHYDRATE 25 G/50ML
12.5 INJECTION, SOLUTION INTRAVENOUS PRN
Status: DISCONTINUED | OUTPATIENT
Start: 2020-12-16 | End: 2020-12-17 | Stop reason: HOSPADM

## 2020-12-16 RX ORDER — NICOTINE POLACRILEX 4 MG
15 LOZENGE BUCCAL PRN
Status: DISCONTINUED | OUTPATIENT
Start: 2020-12-16 | End: 2020-12-17 | Stop reason: HOSPADM

## 2020-12-16 RX ORDER — 0.9 % SODIUM CHLORIDE 0.9 %
500 INTRAVENOUS SOLUTION INTRAVENOUS ONCE
Status: COMPLETED | OUTPATIENT
Start: 2020-12-16 | End: 2020-12-16

## 2020-12-16 RX ADMIN — ZOLEDRONIC ACID 4 MG: 4 INJECTION, SOLUTION, CONCENTRATE INTRAVENOUS at 04:37

## 2020-12-16 RX ADMIN — MEROPENEM 1 G: 1 INJECTION, POWDER, FOR SOLUTION INTRAVENOUS at 17:57

## 2020-12-16 RX ADMIN — MORPHINE SULFATE 15 MG: 15 TABLET, FILM COATED, EXTENDED RELEASE ORAL at 21:59

## 2020-12-16 RX ADMIN — ASPIRIN 81 MG: 81 TABLET, COATED ORAL at 08:32

## 2020-12-16 RX ADMIN — SODIUM CHLORIDE 500 ML: 9 INJECTION, SOLUTION INTRAVENOUS at 08:33

## 2020-12-16 RX ADMIN — MEROPENEM 1 G: 1 INJECTION, POWDER, FOR SOLUTION INTRAVENOUS at 10:04

## 2020-12-16 RX ADMIN — SODIUM CHLORIDE: 9 INJECTION, SOLUTION INTRAVENOUS at 08:34

## 2020-12-16 RX ADMIN — MORPHINE SULFATE 15 MG: 15 TABLET, FILM COATED, EXTENDED RELEASE ORAL at 08:32

## 2020-12-16 ASSESSMENT — PAIN SCALES - GENERAL
PAINLEVEL_OUTOF10: 8
PAINLEVEL_OUTOF10: 7

## 2020-12-16 NOTE — FLOWSHEET NOTE
12/16/20 1432   Encounter Summary   Services provided to: Patient; Family  (Wife)   Referral/Consult From: Nurse  (Consult:  Leila Carbone)   Support System Spouse   Continue Visiting Yes   Complexity of Encounter Low   Length of Encounter 15 minutes   Routine   Type Initial  (Patient)   Assessment Unable to respond;Sleeping   Spiritual/Nondenominational   Type Spiritual support  (Wife)   Assessment Coping  (She received LW copy)   Intervention Provided reading materials/devotional materials  [facility])   Outcome Encouraged   Advance Directives (For Healthcare)   Healthcare Directive No, patient does not have an advance directive for healthcare treatment   Information on [address] given; Unable to complete     Wife appeared willing to talk with pt re:  SIGIFREDO.    [facility] to follow.     Electronically signed by Edwin Adams MA [facility] on 12/16/2020 at 2:35 PM

## 2020-12-16 NOTE — PLAN OF CARE
Nutrition Problem #1: Severe malnutrition  Intervention: Food and/or Nutrient Delivery: Continue Current Diet, Start Oral Nutrition Supplement  Nutritional Goals: Pt will consume 75% or more of meals/supplements with no s/s intolerance

## 2020-12-16 NOTE — PROGRESS NOTES
4 Eyes Skin Assessment    Cain Isabel is being assessed upon: Admission    I agree that I, Skye Davis, along with *** (either 2 RN's or 1 LPN and 1 RN) have performed a thorough Head to Toe Skin Assessment on the patient. ALL assessment sites listed below have been assessed. Areas assessed by both nurses:     [x]   Head, Face, and Ears   [x]   Shoulders, Back, and Chest  [x]   Arms, Elbows, and Hands   [x]   Coccyx, Sacrum, and Ischium  [x]   Legs, Feet, and Heels    Does the Patient have Skin Breakdown?  No    Joselo Prevention initiated: No  Wound Care Orders initiated: No    WOC nurse consulted for Pressure Injury (Stage 3,4, Unstageable, DTI, NWPT, and Complex wounds) and New or Established Ostomies: No        Primary Nurse eSignature: Skye Davis RN on 12/16/2020 at 1:26 AM      Co-Signer eSignature: {Esignature:[id number]}

## 2020-12-16 NOTE — PROGRESS NOTES
Pt. Refused to let me help him into bathroom. Pt almost fell and still denied that I help him ronny bathroom. I have checked with patient multiple times in the bathroom but pt told me to get out.

## 2020-12-16 NOTE — H&P
[employer] - History & Physical      PCP: No primary care provider on file. Date of Admission: 12/15/2020    Date of Service: 12/15/2020    Chief Complaint: Failure to thrive    History Of Present Illness: The patient is a 52 y.o. 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 He went today for a follow up with General surgery to schedule Mediport placement and G tube or J tube and appeared lethargic and was recommended to report to ED for evaluation. Work up concerning for worsening LFT's, hyperkalemia, hypercalcemia, hypotension. Mr. Kailey Cline is unable to provide history due to lethargy. States \"I just hurt all over. \"  Significant other present in room states he has had little oral intake and has just been able to have liquids for taking medications. She also states they have discussed palliative care/code status but patient unable to make a decision at this time.      Past Medical History:        Diagnosis Date    Esophageal mass     GERD (gastroesophageal reflux disease)     Hx of blood clots     DVT post op    Malignant neoplasm of esophagus, unspecified (HCC)     Esophageal Adenocarcinoma    PTSD (post-traumatic stress disorder)     no diagnosed     Past Surgical History:        Procedure Laterality Date    ABDOMINAL EXPLORATION SURGERY      ANTERIOR CRUCIATE LIGAMENT REPAIR Right     x 2    ERCP  10/21/2019    Dr AMADOU Garcia/placement of a fully covered SEMS 23mm x 12.cm esophageal stent    ESOPHAGECTOMY      OTHER SURGICAL HISTORY      feeding tube placement    THORACOTOMY      UPPER GASTROINTESTINAL ENDOSCOPY N/A 10/2/2019    Dr AMADOU Garcia/Successful placement of a 23 mm x 12.5 cm fully covered self-expanding esophageal Wallstent under fluoroscopic guidance    UPPER GASTROINTESTINAL ENDOSCOPY N/A 10/21/2019    Dr Nidia Canela Joes/Successful placement of a 23 mm x 12.5 cm fully covered self-expanding esophageal Wallstent under fluoroscopic guidance    UPPER GASTROINTESTINAL ENDOSCOPY  10/21/2019    Dr AMADOU Garcia/Successful placement of a 23 mm x 12.5 cm fully covered self-expanding esophageal Wallstent under fluoroscopic guidance    UPPER GASTROINTESTINAL ENDOSCOPY N/A 10/28/2019    Dr Jaclyn Garcia/Removal of previously migrated esophageal stent x2     Home Medications:  Prior to Admission medications    Medication Sig Start Date End Date Taking? Authorizing Provider   morphine (MS CONTIN) 15 MG extended release tablet Take 1 tablet by mouth 2 times daily for 30 days. 12/9/20 1/8/21  Naveed Griggs MD   ondansetron (ZOFRAN) 4 MG tablet Take 1 tablet by mouth 3 times daily as needed for Nausea or Vomiting  Patient not taking: Reported on 12/15/2020 12/9/20   REINA Hyde   promethazine (PHENERGAN) 12.5 MG tablet Take 1 tablet by mouth 4 times daily as needed for Nausea  Patient not taking: Reported on 12/15/2020 12/9/20   REINA Hyde   oxyCODONE-acetaminophen (PERCOCET) 7.5-325 MG per tablet Take 1 tablet by mouth every 6 hours as needed for Pain for up to 30 days. Intended supply: 30 days 11/19/20 12/19/20  Briana Walters MD   aspirin 81 MG EC tablet Take 81 mg by mouth daily    Historical Provider, MD       Allergies:    Eggs or egg-derived products    Social History:    The patient currently lives at home  Tobacco:   reports that he has been smoking. He has a 8.50 pack-year smoking history. He has quit using smokeless tobacco.  Alcohol:   reports no history of alcohol use.   Illicit Drugs: denies    Family History:      Problem Relation Age of Onset    Lung Cancer Father 40     Review of Systems:   Constitutional / general:  Denies fever / chills / sweats +fatigue + weight loss  Head:  Denies headache / neck stiffness / trauma / visual change  Eyes:  Denies blurry vision / acute visual change or loss / itching / redness  ENT: Denies sore throat / hoarseness / nasal drainage / ear pain  CV:  Denies chest pain / palpitations/ orthopnea   Respiratory:  Denies cough / shortness of breath / sputum / hemoptysis  GI: + nausea / vomiting / +abdominal pain / diarrhea / constipation  :  Denies dysuria / hesitancy / urgency / hematuria   Neuro: Denies paralysis / syncope / seizure / dysphagia / headache / paresthesias  Musculoskeletal: +muscle weakness /joint stiffness /+ pain  Vascular: Denies edema / claudication / varicosities  Heme / endocrine: Denies easy bruising / bleeding / excessive sweating / heat or cold intolerance  Psychiatric:  Denies depression / anxiety / insomnia / mood changes  Skin:  Denies new rashes / lesions / skin hair or nail changes    14 point review of systems is negative except as specifically addressed above. Physical Examination:  BP 95/84   Pulse 104   Temp 97.6 °F (36.4 °C) (Oral)   Resp 11   SpO2 96%   General appearance: 52year old male, malnourished, appears older than stated age, cachectic   Head: Normocephalic, without obvious abnormality, atraumatic, severe temporal wasting  Eyes: conjunctivae/corneas clear. PERRL, EOM's intact. Mild scleral icterus  Ears: normal external ears and nose, throat without exudate  Neck: no adenopathy, no carotid bruit, no JVD, supple, symmetrical, trachea midline  Lungs: decreased air entry to bilateral bases clear to auscultation bilaterally,no rales or wheezes   Heart: tachycardic, regular rhythm, S1, S2 normal, no murmur  Abdomen:scaphoid with diffuse tenderness, right CVA/flank tenderness   Extremities:No lower extremity edema,  No erythema, no tenderness to palpation  Skin: Jaundiced, poor turgor  Neurologic: Lethargic, easily awakened. Oriented.  Generalized weakness   Psychiatric: unable to fully assess     Diagnostic Data:  CBC:  Recent Labs     12/15/20  1638   WBC 8.5   HGB 17.1   HCT 49.4        BMP:  Recent Labs     12/15/20  1753 *   K 6.1*   CL 87*   CO2 27   BUN 71*   CREATININE 1.2   CALCIUM 12.6*     Recent Labs     12/15/20  1753   *   *   BILITOT 6.8*   ALKPHOS 1,428*     Coag Panel:   Recent Labs     12/15/20  1645   INR 2.10*   PROTIME 23.9*     Urinalysis:  Lab Results   Component Value Date    NITRU POSITIVE 12/15/2020    WBCUA 0-1 12/15/2020    BACTERIA None Seen 12/15/2020    RBCUA 2-4 12/15/2020    BLOODU Negative 12/15/2020    SPECGRAV 1.022 12/15/2020    GLUCOSEU Negative 12/15/2020     Xr Chest Portable  1. No radiographic evidence of acute cardiopulmonary process. No visualized infiltrate. Signed by Dr Mary Kate Yepez on 12/15/2020 6:28 PM    Assessment/Plan:      Esophageal adenocarcinoma ([facility] [address] w/ failure to thrive/hypotension-continue aggressive IVF resuscitation.  Oncology/Paliative care consults placed       Hyperbilirubinemia w/ worsening elevation LFTs-concerning for obstruction with known liver metastasis, will check US      Hyperkalemia-received D50, Insulin, Bicarb in ED, recheck potassium now and monitor overnight on telemetry      Hypercalcemia-Recheck in AM, consider zoledronic acid      Hyponatremia suspect 2/2 severe Dehydration-continue aggressive IVF resuscitation      Further orders per clinical course/attending    Signed:  Malcom Hoffman PA-C

## 2020-12-16 NOTE — CONSULTS
MEDICAL ONCOLOGY CONSULTATION    Pt Name: Bridgett Elias  MRN: [id number]  YOB: 1970  Date of evaluation: 12/16/2020    REASON FOR CONSULTATION:  Esophageal   REQUESTING PHYSICIAN:Hospitalist      History Obtained From: patient, chart review      HISTORY OF PRESENT ILLNESS:        The patient is a unfortunate 52years old male who has a diagnosis of recurrent esophageal adenocarcinoma with liver metastasis. He has a very poor performance status, physical deconditioning and severe cachexia. He was admitted with complaints of increased fatigue, decreased activity and failure to thrive. He had an appoint for port placement for consideration of chemotherapy. Unfortunately, his performance status it is really poor and I do not believe he is a candidate for further palliative treatment at this time. Palliative care has been consulted. Ultrasound showed multiple hepatic masses. Cancer history  Bridgett Elias is a 54 y.o. male managed with primary and secondary diagnoses as outlined:  · Adenocarcinoma of the distal esophagus made by endoscopy on 9/27/2019. · Tumor screening and health maintenance     Regarding his diagnosis of distal esophageal adenocarcinoma, Rajeev Chaves received 9 cycles of neoadjuvant weekly carboplatinum/Taxol 10/28/2019-12/30/2019. XRT was completed 12/24/2019 for 1228 treatment fractions.     On 3/3/2020 Dr Rogel Angle a Bronchoscopy,EGD,Exploratory laparotomy, Right thoracotomy with mobilization of the esophagus, Ligation of the thoracic duct, Three-field esophagectomy with cervical anastomosis, Pyloroplasty, and Jejunostomy feeding tube placement.     He has required esophageal dilatation.     Rajeev Chaves was evaluated in the clinic 11/12/2020 in routine follow-up at which time he stated he had been doing very well postoperatively up until a few weeks ago.    He reported nausea, abdominal discomfort, early satiety, unexplained weight loss, back pain and feeling poorly in general.  He had lost at least 8 pounds over the prior 6 months. His main complaint is pain over the right flank area radiating to the front.     Labs 11/12/2020 included an elevated CEA of 81.5 and elevated LFTs. CT scan of the chest, abdomen/pelvis were requested for further evaluation and completed this morning, 11/19/2020.     I received a phone call from radiologist, Dr. Love Levi, who reported findings concerning for metastatic disease.     Rajeev Chaves was contacted by Noé Dick RN and was requested to come to the clinic to discuss findings. Rajeev Chaves stated he had no one to bring him to the clinic. He is very weak and declined presenting to the nearest emergency room.     Rajeev Chaves was agreeable for virtual visit which is performed at this time.           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 Nettie Ortiz was seen in initial oncology consultation on 10/9/2019 with a new diagnosis of adenocarcinoma of the distal esophagus referred by Dr. Shivani Gamble for further work-up, care coordination and treatment decisions.     Rajeev Chaves states that 20 years ago he had an episode when a large piece of chicken got stuck in his throat requiring removal.  Since that time he has had occasional dysphasia problems but nothing significant.  When symptoms began several weeks prior to presentation, he initially thought it was something similar and for that reason did not seek immediate evaluation.     On 9/18/2019, Rajeev Chaves presented to [facility] ED with a complaint of 3 to 4 weeks of difficulty swallowing and approximately 20 pound weight loss reported. He was seen in the emergency room by Ayana Castaneda PA-C working with Dr. Irving Sanchez.    3 weeks prior to presentation he began feeling pain with swallowing solid foods. [facility] switched to a soft food diet.  He noted that he was still able to tolerate liquids and denied vomiting.  He stated that with swallowing however the food seemed to get caught in his mid chest area.  After instructions on diet etc., he was encouraged to follow-up with a PCP and consideration for GI follow-up as well thereafter.  A list of physicians was provided for him to review and pick a provider.     Noel Sanchez was evaluated by Morro HUANG on 9/25/2019 reporting that by this time he was already having problems with liquids in addition to the solid and soft foods previously described at [facility]. A referral was made for GI evaluation.     Mr. Van was seen by REINA Simms with Dr. Kusum Morris at the GI clinic on 9/26/2019 for evaluation of the above complaints.  Arrangements were made for an EGD.     On 9/27/2019 Dr. Kusum Morris performed an upper GI endoscopy and biopsy. Findings were as follows:  · A large fungating mass with no stigmata of recent bleeding in the lower third of the esophagus at 33 cm from the incisors. · The mass was completely obstructing and circumferential  · A pediatric endoscope was unable to be passed. · Biopsies were taken for histology.   · Redford-colored mucosa was present and extended from the mass up to 28 cm     Pathology documented invasive poorly differentiated adenocarcinoma with signet ring cell differentiation, focally arising in the background of Willingham's esophagus.    Focal high-grade glandular dysplasia was also seen along with chronic mild inflammation.     CT scan of the chest with contrast on 9/27/2019 identified the following:  · Wall thickening of the mid to distal third of the esophagus position approximately 5 cm below the level of the leoncio  · Nonspecific subcentimeter paraesophageal lymph nodes  · No pathologic mediastinal or hilar adenopathy noted  · No visible axillary lymphadenopathy     CT scan of the abdomen and pelvis with deployed stent signifying the severity of the stricture. Amedeo Nails was a large amount of fluid emanating from the stomach which was suctioned through the scope.  Photographs were obtained.     On 10/9/2019 Mr. Van was initially evaluated in this office.  His examination did not disclose palpable supraclavicular lymphadenopathy nor acute or worrisome abdominal findings related to the above diagnosis.     Serology on 10/9/2019 documented a normal CEA of 2.6 with an elevated CA 19-9 of 72.2     PET/CT on 10/14/2019 documented abnormal FDG uptake in the mid to distal esophagus with an SUV of 10.63 compatible with the diagnosis of esophageal adenocarcinoma  Notation was made of the fact that the esophageal stent was located completely within the stomach.     Radiation oncology consultation by Dr. Luana Ren and the [facility] was performed on 10/23/2019. 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 The plan was discussed with Dr. Luana Ren with plans for neoadjuvant concurrent XRT with weekly Carboplatin and Taxol chemotherapy based on the  [place] experience.     Mr. Van was evaluated by Dr. Narayan Monte at [facility] in [place] on 10/31/2019.    Dr. Bhakta Leland assessment and recommendation was to proceed with neoadjuvant chemotherapy and XRT to be followed by surgical resection. [facility] desires to see Mr. Van upon completion of neoadjuvant therapy.     Results still pending:  · Serology for Guardant 360 molecular markers  · Path to [facility] for NGS, PDL 1 and MSI  · Consideration may also need to be given for MRI of the abdomen with and without contrast to further evaluate the liver.  Will await surgical opinion by Dr. Narayan Monte at [facility].     Weekly Carboplatin at an AUC of 2 with Taxol 50 mg/m² initiated 10/28/2019 and the final cycle #9 delivered on 12/30/2019     XRT was initiated on 11/12/2019 He completed 28 treatment fractions on 12/24/2019.      CT chest, abdomen with contrast at [facility] on 1/24/2020 documented:  · Patient's known esophageal adenocarcinoma somewhat poorly defined, but subjectively appears decreased in size since 9/27/2019. · Slight decrease in size of periesophageal lymph node  · No evidence of new or worsening disease in chest  · No findings to suggest metastatic disease in abdomen      On 3/3/2020 Dr July Dumont performed the following procedures at [facility]:  1. Bronchoscopy  2. EGD  3. Exploratory laparotomy   4. Right thoracotomy with mobilization of the esophagus  5. Ligation of the thoracic duct  6. Three-field esophagectomy with cervical anastomosis   7. Pyloroplasty  8. Jejunostomy feeding tube     Operative findings:  · Normal endobronchial anatomy  · Near complete obstruction at 32 cm   · Willingham's esophagus extending up to 27 cm  · No evidence of cancer or mass on retroflexion in the stomach  · Esophagus extremely stuck to the mediastinum and no clear planes were present near the tumor     Pathology revealed the following:     Esophagus, esophagectomy:   · Minimal residual invasive adenocarcinoma (0.2 cm)  · All margins negative for carcinoma   · Treatment effect present   · 18 lymph nodes, negative for carcinoma       Lymph node, level 7, excision:  · One lymph node, negative for carcinoma  Bone. Sixth rib right, excision:  · Bone and marrow elements with no significant histopathologic change     On 3/7/2020 Dr. July Dumont performed a bronchoscopy with bronchoalveolar lavage and found a large mucous plug occluding the left mainstem bronchus. Irrigation and suction performed and irrigant sent for culture. Culture revealed:  1. MRSA  2. E-coli  3. Candida albicans  4.  AFB showed no growth at 6 weeks     On 3/10/2020 Joan Luis performed a bronchoscopy which revealed a large mucous plug including the left mainstem bronchus. This was completely removed and irrigated. No evidence of plugging on the right.      He was treated at [facility] with vancomycin for the MRSA while hospitalized.   Jake De La Cruz is pleased with the outcome of surgery and the results of pathology. He does not desire further chemotherapy. No further chemotherapy will be recommended.         TREATMENT SUMMARY:  1. Weekly Carboplatin at an AUC of 2 with Taxol 50 mg/m² initiated 10/28/2019 and the final cycle #9 delivered on 12/30/2019  2. XRT was initiated on 11/12/2019, he completed 28 treatment fractions on 12/24/2019.  3. On 3/3/2020 Dr Radha Haque a Bronchoscopy, EGD, Exploratory laparotomy, Right thoracotomy with mobilization of the esophagus, Ligation of the thoracic duct, Three-field esophagectomy with cervical anastomosis, Pyloroplasty, and Jejunostomy feeding tube placement.         Past Medical History:    Past Medical History:   Diagnosis Date    Esophageal mass     GERD (gastroesophageal reflux disease)     Hx of blood clots     DVT post op    Malignant neoplasm of esophagus, unspecified (HCC)     Esophageal Adenocarcinoma    PTSD (post-traumatic stress disorder)     no diagnosed       Past Surgical History:    Past Surgical History:   Procedure Laterality Date    ABDOMINAL EXPLORATION SURGERY      ANTERIOR CRUCIATE LIGAMENT REPAIR Right     x 2    ERCP  10/21/2019    Dr AMADOU Garcia/placement of a fully covered SEMS 23mm x 12.cm esophageal stent    ESOPHAGECTOMY      OTHER SURGICAL HISTORY      feeding tube placement    THORACOTOMY      UPPER GASTROINTESTINAL ENDOSCOPY N/A 10/2/2019    Dr AMADOU Garcia/Successful placement of a 23 mm x 12.5 cm fully covered self-expanding esophageal Wallstent under fluoroscopic guidance    UPPER GASTROINTESTINAL ENDOSCOPY N/A 10/21/2019    Dr AMADOU Garcia/Successful placement of a [facility] Medications:    Current Facility-Administered Medications: insulin lispro (HUMALOG) injection vial 0-12 Units, 0-12 Units, Subcutaneous, TID WC  insulin lispro (HUMALOG) injection vial 0-6 Units, 0-6 Units, Subcutaneous, Nightly  glucose (GLUTOSE) 40 % oral gel 15 g, 15 g, Oral, PRN  dextrose 50 % IV solution, 12.5 g, Intravenous, PRN  glucagon (rDNA) injection 1 mg, 1 mg, Intramuscular, PRN  dextrose 5 % solution, 100 mL/hr, Intravenous, PRN  0.9 % sodium chloride bolus, 500 mL, Intravenous, Once  meropenem (MERREM) 1 g in sterile water 20 mL IV syringe, 1 g, Intravenous, Q8H  0.9 % sodium chloride infusion, , Intravenous, Continuous  aspirin EC tablet 81 mg, 81 mg, Oral, Daily  morphine (MS CONTIN) extended release tablet 15 mg, 15 mg, Oral, BID  oxyCODONE (ROXICODONE) immediate release tablet 5 mg, 5 mg, Oral, Q6H PRN  naloxone (NARCAN) injection 0.4 mg, 0.4 mg, Intravenous, PRN  sodium chloride flush 0.9 % injection 10 mL, 10 mL, Intravenous, 2 times per day  sodium chloride flush 0.9 % injection 10 mL, 10 mL, Intravenous, PRN  polyethylene glycol (GLYCOLAX) packet 17 g, 17 g, Oral, Daily PRN  acetaminophen (TYLENOL) tablet 650 mg, 650 mg, Oral, Q6H PRN **OR** acetaminophen (TYLENOL) suppository 650 mg, 650 mg, Rectal, Q6H PRN  ondansetron (ZOFRAN) injection 4 mg, 4 mg, Intravenous, Q6H PRN  promethazine (PHENERGAN) injection 12.5 mg, 12.5 mg, Intravenous, Q6H PRN    Allergies:    Allergies   Allergen Reactions    Eggs Or Egg-Derived Products Swelling         Subjective   REVIEW OF SYSTEMS:   CONSTITUTIONAL: no fever, no night sweats, fatigue; loss, decreased activity  HEENT: no blurring of vision, no double vision, no hearing difficulty, no tinnitus, no ulceration, no dysplasia, no epistaxis;  LUNGS: no cough, no hemoptysis, no wheeze,  no shortness of breath;  CARDIOVASCULAR: no palpitation, no chest pain, no shortness of breath;  GI: Right upper quadrant abdominal pain, no nausea, no vomiting, no diarrhea, no constipation;  RANDI: no dysuria, no hematuria, no frequency or urgency, no nephrolithiasis;  MUSCULOSKELETAL: Muscle weakness, no joint pain, no swelling, no stiffness;  ENDOCRINE: no polyuria, no polydipsia, no cold or heat intolerance;  HEMATOLOGY: no easy bruising or bleeding, no history of clotting disorder;  DERMATOLOGY: no skin rash, no eczema, no pruritus;  PSYCHIATRY: no depression, no anxiety, no panic attacks, no suicidal ideation, no homicidal ideation;  NEUROLOGY: no syncope, no seizures, no numbness or tingling of hands, no numbness or tingling of feet, no paresis;    Objective   BP (!) 79/60   Pulse 96   Temp 96.7 °F (35.9 °C)   Resp 14   Ht 5' 10\" (1.778 m)   Wt 115 lb (52.2 kg)   SpO2 90%   BMI 16.50 kg/m²     PHYSICAL EXAM:  CONSTITUTIONAL: Alert, appropriate, no acute distress, severely cachectic appearing  EYES: Non icteric, EOM intact, pupils equal round, temporal wasting  ENT: Mucus membranes moist, no oral pharyngeal lesions, external inspection of ears and nose are normal  NECK: Supple, no masses. No palpable thyroid mass  CHEST/LUNGS: CTA bilaterally, normal respiratory effort   CARDIOVASCULAR: RRR, no murmurs. No lower extremity edema  ABDOMEN: soft non-tender, active bowel sounds, no HSM. No palpable masses  EXTREMITIES: warm, full ROM in all 4 extremities, no focal weakness. SKIN: warm, dry with no rashes or lesions. Muscle loss  LYMPH: No cervical, clavicular, axillary, or inguinal lymphadenopathy  NEUROLOGIC: follows commands, non focal   PSYCH: mood and affect appropriate.   Alert and oriented to time, place, person        LABORATORY RESULTS REVIEWED BY ME:  Recent Labs     12/16/20  0359 12/15/20  1638 12/09/20  1259   WBC 7.1 8.5 9.85*   HGB 13.8* 17.1 15.4   HCT 40.2* 49.4 44.4   MCV 93.7 93.7 95.7*    211 191       Lab Results   Component Value Date     (L) 12/16/2020    K 5.0 12/16/2020    CL 87 (L) 12/16/2020    CO2 33 (H) 12/16/2020    BUN 61 (H) 12/16/2020    CREATININE 1.0 12/16/2020    GLUCOSE 114 (H) 12/16/2020    CALCIUM 12.1 (HH) 12/16/2020    PROT 5.0 (L) 12/16/2020    LABALBU 3.0 (L) 12/16/2020    BILITOT 6.6 (H) 12/16/2020    ALKPHOS 1,188 (H) 12/16/2020     (H) 12/16/2020     (H) 12/16/2020    LABGLOM >60 12/16/2020    GFRAA >59 12/16/2020    AGRATIO 1.7 11/12/2020    GLOB 2.8 12/09/2020       Lab Results   Component Value Date    INR 1.62 (H) 12/16/2020    INR 2.10 (H) 12/15/2020    PROTIME 19.4 (H) 12/16/2020    PROTIME 23.9 (H) 12/15/2020       RADIOLOGY STUDIES REPORT/REVIEWED AND INTERPRETED BY ME:  Ct Chest W Contrast    Result Date: 11/19/2020  Examination. CT CHEST W CONTRAST 11/19/2020 8:55 AM History: History of esophageal malignancy. Abnormal weight loss. DLP: 258 mGycm. The CT scan of the chest is performed after intravenous contrast enhancement. The images are acquired in axial plane with subsequent reconstruction in coronal and sagittal planes. The comparison is made with the previous study dated 9/27/2019. The lungs bilaterally are moderately well expanded. There are several small, subcentimeter, ill-defined pulmonary nodules bilaterally which was not seen in the previous study. No dominant nodule/mass. No areas of focal consolidation or infiltrate. The included part of the neck show no discrete mass or lymphadenopathy. The thyroid is suboptimally visualized and evaluated. There is a poorly defined mass in the left proximal paratracheal area below the level of the thyroid gland which was not seen in the previous study. It measures 2.8 x 2.2 cm. It encases the left common carotid artery and proximal part of the left subclavian and proximal left vertebral artery. Atheromatous changes of thoracic aorta are seen. No aneurysmal dilatation or dissection. Normal opacification of pulmonary arteries and the branches. No filling defects. Evidence of previous esophagus replacement surgery.  There is no evidence of mediastinal or hilar mass or lymphadenopathy. There are ill-defined density/debris in the distal trachea and the proximal mainstem bronchi which may represent retained mucus or aspirated material?. The included part of the liver show a large low-density area/masses in the liver which was not seen in the previous study. This may be further reviewed and report with CT scan of the abdomen. The images reviewed in bone window show an ill-defined area of bony sclerosis involving the posterior part of the vertebra T2. No extends into the pedicles. This was noted in the previous study without a significant change. The remaining bones appear unremarkable. The visualized ribs are unremarkable. Multiple pulmonary nodules which are not seen in the previous study represent metastatic disease. Metastatic hepatic lesions be further discussed with CT scan of the abdomen. A left-sided neck mass below the left lobe of the thyroid gland as detailed above. This was not seen in the previous study. This may represent a metastatic lesion. The source is not certain. An ill-defined density of sclerosis involving the posterior aspect of the vertebra T2. Possibility of a metastatic focus is not excluded. Further follow-up is suggested. Other findings as above. Signed by Dr Viet Helms on 11/19/2020 11:22 AM    Ct Abdomen Pelvis W Iv Contrast Additional Contrast? Oral    Result Date: 11/19/2020  CT ABDOMEN PELVIS W IV CONTRAST 11/19/2020 8:57 AM HISTORY: C15.9 COMPARISON: CT scan dated 9/27/2019. DLP: 241 mGy cm TECHNIQUE: Following the uneventful administration of intravenous iodinated contrast, helical CT tomographic images of the abdomen and pelvis were acquired. Coronal reformatted images were also provided for review. Automated exposure control was also utilized to decrease patient radiation dose.  FINDINGS: LIVER: [id number] hypoenhancing cystic lesions throughout the left hepatic lobe with several additional pelvis dated November 19, 2020. PROCEDURE: Real-time sonographic evaluation of the right upper quadrant was performed. Multiple representative images were obtained and archived to PACS for review. FINDINGS:  PANCREAS: Not well visualized. LIVER: Multiple masses are seen in the liver, better seen on CT abdomen pelvis dated November 19, 2020. GALLBLADDER AND BILE DUCTS: The gallbladder is not well visualized. There is no intra-or extrahepatic biliary ductal dilatation. The common bile duct is non-dilated and measures 4 millimeters . RIGHT KIDNEY: The right kidney is unremarkable in appearance . 1. Multiple hepatic masses, known. 2. No evidence of dilatation of the biliary system to suggest an obstruction. Signed by Dr Olivia Smiley on 12/16/2020 8:06 AM    Xr Chest Portable    Result Date: 12/15/2020  XR CHEST PORTABLE 12/15/2020 4:23 PM HISTORY: Fatigue COMPARISON: 10/25/2019. FINDINGS: No lung consolidation. No pleural effusion or pneumothorax. The cardiomediastinal silhouette and pulmonary vascularity are within normal limits. The osseous structures and surrounding soft tissues demonstrate no acute abnormality. 1. No radiographic evidence of acute cardiopulmonary process. No visualized infiltrate. Signed by Dr Elke Cochran on 12/15/2020 6:28 PM    Us Guided Needle Placement    Result Date: 11/30/2020  EXAM: US GUIDED NEEDLE PLACEMENT -- 11/30/2020 9:24 AM HISTORY: 52 years, Male, history of esophageal adenocarcinoma, left neck mass, liver lesions COMPARISON: CT 11/19/2020 PROCEDURE: Risks, benefits and alternatives were discussed with the patient prior to the procedure. Specifically, risks of bleeding, infection, damage to surrounding structures and nondiagnostic biopsy were discussed with the patient. Written and verbal informed consent was obtained. A timeout was performed including the patient's name, date of birth, biopsy site and laterality.  Using ultrasound, a site for biopsy of left neck mass was selected and prepped in the usual sterile fashion. 1 percent  lidocaine was used for local anesthesia. Using ultrasound guidance, fine-needle aspiration was performed of the left neck mass. 3 passes were made. The material was deemed adequate on-site cytology service. Biopsy samples were taken by cytology. The patient tolerated the procedure well. No evidence of complication. Postprocedure counseling performed. Patient agrees to follow up with referring clinician for biopsy results. The patient discharged in stable condition. 1. Successful ultrasound guided fine-needle aspiration of left neck mass. Patient agrees to follow up with referring clinician for biopsy results. Signed by Dr Roxana Ferro on 11/30/2020 11:40 AM    Pet Ct Skull Base To Mid Thigh    Result Date: 11/30/2020  EXAMINATION:  PET CT SKULL BASE TO MID THIGH  11/30/2020 2:05 PM HISTORY: Esophageal cancer post esophagectomy. COMPARISON: PET/CT imaging dated 10/14/2019. CT chest abdomen pelvis dated 11/19/2020 TECHNIQUE: 14.42 mCi of F-18 FDG was administered and PET CT imaging obtained. PET CT fusion images were generated. Blood glucose level equals 104 mg/dL. Isai Sanchez FINDINGS: Interval development of extensive diffuse metastatic disease is appreciated. There are extensive liver metastases. Largest lesion involving the majority of the left lobe demonstrates a maximum intensity of 13.89 SUV. There is a hypermetabolic lesion in the superior mediastinum, described on recent CT examination, to the left of the trachea is hypermetabolic with a maximum intensity of 6.28 SUV. There is increased PET scan activity identified in the subcarinal middle mediastinal node with a maximum intensity of 3.32 SUV. Early Metastatic lymphadenopathy considered. There are extensive hypermetabolic osseous metastases, too many to count. Metastatic lesions involve sternum, right scapula and proximal humerus. Multiple vertebral body lesions and rib lesions identified.  Extensive lesions involving the pelvic bones appreciated, largest noted in the right ilium with a maximum intensity of 9.65 SUV measurement. 1. Extensive hypermetabolic metastatic disease particularly involving the liver and osseous structures. Signed by Dr Romero Rowell on 11/30/2020 2:19 PM      My interpretation: Bone Metastasis, liver metastasis      ASSESSMENT:  #Stage IV esophageal cancer (liver metastasis, bone metastasis (  Very poor performance status, liver metastasis with visceral crisis and elevated AST/ALT and total bilirubin. Lengthy discussion about goals of care. I have recommended hospice. #Severe protein calorie malnutrition  #Poor prognosis  #Poor physical deconditioning  Essentially, patient has advanced disease with very poor performance status and severe and advanced cachexia. He is not a candidate for anticancer therapy. I strongly recommended hospice care. #Hyponatremia  #Hyperkalemia-as per hospitalist  #Dehydration    PLAN:  Recommended hospice care  IV fluid  Continue supportive care    I have seen, examined and reviewed this patient medication list, appropriate labs and imaging studies. I reviewed relevant medical records and others physicians notes. I discussed the plans of care with the patient. I answered all the questions to the patients satisfaction.       (Please note that portions of this note were completed with a voice recognition program. Efforts were made to edit the dictations but occasionally words are mis-transcribed.)        Cyn Felix MD    12/16/20  8:07 AM

## 2020-12-16 NOTE — PROGRESS NOTES
Summary (Last 24 hours) at 12/16/2020 1018  Last data filed at 12/16/2020 0517  Gross per 24 hour   Intake 1927 ml   Output 300 ml   Net 1627 ml     General appearance: 52year old male, frail, cachectic   Head: Normocephalic, without obvious abnormality, atraumatic, temporal wasting  Eyes: conjunctivae/corneas clear. PERRL, EOM's intact. Ears: normal external ears and nose, throat without exudate  Neck: no adenopathy, no carotid bruit, no JVD, supple, symmetrical, trachea midline   Lungs: clear to auscultation bilaterally, no rales or wheezes   Heart: regular rate and rhythm, S1, S2 normal, no murmur  Abdomen:scaphoid, mild diffuse tenderness, no rebound or guarding   Extremities:No lower extremity edema,  No erythema, no tenderness to palpation, muscular atrophy  Skin: Skin color, texture, turgor normal. No rashes or lesions  Lymphatic: No palpable lymph node enlargment  Neurologic: Alert and oriented X 3, generalized weakness and normal tone.  No focal deficits  Psychiatric: Depressed mood, withdrawn     Medications:      dextrose      sodium chloride 200 mL/hr at 12/16/20 0834      insulin lispro  0-12 Units Subcutaneous TID WC    insulin lispro  0-6 Units Subcutaneous Nightly    sodium chloride  500 mL Intravenous Once    meropenem (MERREM) 1 g in SWFI 20 mL IV syringe  1 g Intravenous Q8H    aspirin  81 mg Oral Daily    morphine  15 mg Oral BID    sodium chloride flush  10 mL Intravenous 2 times per day     glucose, dextrose, glucagon (rDNA), dextrose, oxyCODONE, naloxone, sodium chloride flush, polyethylene glycol, acetaminophen **OR** acetaminophen, ondansetron, promethazine  DIET CLEAR LIQUID;     Lab and other Data:     Recent Labs     12/15/20  1638 12/16/20  0359   WBC 8.5 7.1   HGB 17.1 13.8*    140     Recent Labs     12/15/20  1753 12/15/20  2004 12/15/20  2158 12/16/20  0359   *  --   --  132*   K 6.1*  --  5.5* 5.0   CL 87*  --   --  87*   CO2 27  --   --  33*   BUN 71*  -- --  61*   CREATININE 1.2  --   --  1.0   GLUCOSE 107 357  --  114*     Recent Labs     12/15/20  1753 12/16/20  0359   * 337*   * 172*   BILITOT 6.8* 6.6*   ALKPHOS 1,428* 1,188*     INR:   Recent Labs     12/15/20  1645 12/16/20  0359   INR 2.10* 1.62*     UA:  Recent Labs     12/15/20  1929   COLORU DARK YELLOW*   PHUR 5.0   WBCUA 0-1   RBCUA 2-4   BACTERIA None Seen*   CLARITYU Clear   SPECGRAV 1.022   LEUKOCYTESUR TRACE*   UROBILINOGEN 2.0*   BILIRUBINUR MODERATE*   BLOODU Negative   GLUCOSEU Negative     A1C:   Recent Labs     12/16/20 0359   LABA1C 5.6     RAD:   Us Gallbladder Ruq  1. Multiple hepatic masses, known. 2. No evidence of dilatation of the biliary system to suggest an obstruction. Signed by Dr Jose M Villalta on 12/16/2020 8:06 AM    Xr Chest Portable  1. No radiographic evidence of acute cardiopulmonary process. No visualized infiltrate. Signed by Dr Liz Perdomo on 12/15/2020 6:28 PM    Micro: Urine culture pending    Assessment/Plan   Active Problems:    Esophageal adenocarcinoma ([facility] [address] w/ failure to thrive/hypotension/dehydration-continue aggressive IVF resuscitation. Feel patient would be appropriate for hospice care at this time. Pain control while avoiding worsening hypotension      Severe protein calorie malnutrition-dietary following, oral intake encouraged as tolerated      Hyperkalemia-received D50, insulin, bicarb in ED.  Resolved, monitor      Hypercalcemia-received Zometa, improved      Hyponatremia-improving, monitor    DVT Prophylaxis: Lovenox    Lorraine Closs, PA-C       Attestation Statement     I have independently seen and examined this patient and agree with the asesment and plan by mid level provider                                                           HOSPITAL MEDICINE  - PROGRESS NOTE         Objective:   Vitals: /72   Pulse 109   Temp 97.6 °F (36.4 °C) (Temporal)   Resp 16   Ht 5' 10\" (1.778 m)   Wt 115 lb (52.2 kg)   SpO2 93%   BMI 16.50 kg/m²   General appearance: lethargic, severe distress  Skin: Skin color, texture, turgor normal.   Heart: regular rate and rhythm, S1, S2 normal, no murmur, click, rub or gallop  Abdomen: rigid, tender, bs diminished   Extremities: extremities normal, atraumatic, no cyanosis or edema      Assessment & Plan:    Esophageal adenocarcinoma - oncology on board- poor prognosis- not a candidate for anticancer tx- hospice consult        Lorelei Montoya MD

## 2020-12-16 NOTE — PROGRESS NOTES
Comprehensive Nutrition Assessment    Type and Reason for Visit:  Initial, Positive Nutrition Screen    Nutrition Recommendations/Plan: Ensure Clear with meals    Nutrition Assessment:  Pt is Severely Malnourished AEB wt loss, poor oral intake, and temporal wasting. Pt is currently on a Clear Liquid diet, which will not meet 100% of nutritional needs. Will start Ensure Clear TID. Malnutrition Assessment:  Malnutrition Status:  Severe malnutrition    Context:  Chronic Illness     Findings of the 6 clinical characteristics of malnutrition:  Energy Intake:  7 - 75% or less estimated energy requirements for 1 month or longer  Weight Loss:  7 - 20% over 1 year     Body Fat Loss:  Unable to assess     Muscle Mass Loss:  7 - Severe muscle mass loss    Fluid Accumulation:  No significant fluid accumulation     Strength:  Not Performed    Estimated Daily Nutrient Needs:  Energy (kcal):  3695-0874 kcals/day; Weight Used for Energy Requirements:  Current(30-35)     Protein (g):  88 g/PRO/day; Weight Used for Protein Requirements:  Current(1.7)        Fluid (ml/day):  3126-1539 mL/day; Method Used for Fluid Requirements:  1 ml/kcal       Current Nutrition Therapies:    DIET CLEAR LIQUID;     Anthropometric Measures:  · Height: 5' 10\" (177.8 cm)  · Current Body Weight: 115 lb (52.2 kg)    · Ideal Body Weight: 166 lbs  · BMI: 16.5  · BMI Categories: Underweight (BMI less than 18.5)       Nutrition Diagnosis:   · Severe malnutrition related to catabolic illness as evidenced by poor intake prior to admission, weight loss greater than or equal to 20% in 1 year, severe muscle loss    Nutrition Interventions:   Food and/or Nutrient Delivery:  Continue Current Diet, Start Oral Nutrition Supplement  Coordination of Nutrition Care:  Continue to monitor while inpatient    Goals:  Pt will consume 75% or more of meals/supplements with no s/s intolerance       Nutrition Monitoring and Evaluation:   Food/Nutrient Intake Outcomes: Food and Nutrient Intake, Supplement Intake, Diet Advancement/Tolerance  Physical Signs/Symptoms Outcomes:  Biochemical Data, Chewing or Swallowing, Nutrition Focused Physical Findings, Weight     Electronically signed by Roger Ley, MS, RD, LD on 12/16/20 at 2:05 PM CST    Contact: [phone]

## 2020-12-17 VITALS
OXYGEN SATURATION: 91 % | SYSTOLIC BLOOD PRESSURE: 109 MMHG | BODY MASS INDEX: 16.46 KG/M2 | TEMPERATURE: 97.4 F | DIASTOLIC BLOOD PRESSURE: 75 MMHG | WEIGHT: 115 LBS | HEART RATE: 103 BPM | HEIGHT: 70 IN | RESPIRATION RATE: 14 BRPM

## 2020-12-17 PROBLEM — Z51.5 PALLIATIVE CARE PATIENT: Status: ACTIVE | Noted: 2020-12-17

## 2020-12-17 LAB
ALBUMIN SERPL-MCNC: 2.7 G/DL (ref 3.5–5.2)
ALP BLD-CCNC: 1111 U/L (ref 40–130)
ALT SERPL-CCNC: 164 U/L (ref 5–41)
ANION GAP SERPL CALCULATED.3IONS-SCNC: 11 MMOL/L (ref 7–19)
AST SERPL-CCNC: 302 U/L (ref 5–40)
BASE EXCESS ARTERIAL: 1.4 MMOL/L (ref -2–2)
BASOPHILS ABSOLUTE: 0 K/UL (ref 0–0.2)
BASOPHILS RELATIVE PERCENT: 0.1 % (ref 0–1)
BILIRUB SERPL-MCNC: 8 MG/DL (ref 0.2–1.2)
BUN BLDV-MCNC: 46 MG/DL (ref 6–20)
CALCIUM SERPL-MCNC: 10.8 MG/DL (ref 8.6–10)
CARBOXYHEMOGLOBIN ARTERIAL: 2.7 % (ref 0–5)
CHLORIDE BLD-SCNC: 97 MMOL/L (ref 98–111)
CO2: 26 MMOL/L (ref 22–29)
CREAT SERPL-MCNC: 0.6 MG/DL (ref 0.5–1.2)
EOSINOPHILS ABSOLUTE: 0.2 K/UL (ref 0–0.6)
EOSINOPHILS RELATIVE PERCENT: 2.5 % (ref 0–5)
GFR AFRICAN AMERICAN: >59
GFR NON-AFRICAN AMERICAN: >60
GLUCOSE BLD-MCNC: 85 MG/DL (ref 74–109)
GLUCOSE BLD-MCNC: 86 MG/DL (ref 70–99)
HCO3 ARTERIAL: 26.6 MMOL/L (ref 22–26)
HCT VFR BLD CALC: 41.5 % (ref 42–52)
HEMOGLOBIN, ART, EXTENDED: 14.2 G/DL (ref 14–18)
HEMOGLOBIN: 13.8 G/DL (ref 14–18)
IMMATURE GRANULOCYTES #: 0.1 K/UL
INR BLD: 1.62 (ref 0.88–1.18)
LYMPHOCYTES ABSOLUTE: 0.4 K/UL (ref 1.1–4.5)
LYMPHOCYTES RELATIVE PERCENT: 5.1 % (ref 20–40)
MCH RBC QN AUTO: 32.1 PG (ref 27–31)
MCHC RBC AUTO-ENTMCNC: 33.3 G/DL (ref 33–37)
MCV RBC AUTO: 96.5 FL (ref 80–94)
METHEMOGLOBIN ARTERIAL: 0.8 %
MONOCYTES ABSOLUTE: 0.7 K/UL (ref 0–0.9)
MONOCYTES RELATIVE PERCENT: 8.6 % (ref 0–10)
NEUTROPHILS ABSOLUTE: 6.4 K/UL (ref 1.5–7.5)
NEUTROPHILS RELATIVE PERCENT: 82.9 % (ref 50–65)
O2 CONTENT ARTERIAL: 19.2 ML/DL
O2 SAT, ARTERIAL: 95.6 %
O2 THERAPY: ABNORMAL
PCO2 ARTERIAL: 43 MMHG (ref 35–45)
PDW BLD-RTO: 18 % (ref 11.5–14.5)
PERFORMED ON: NORMAL
PH ARTERIAL: 7.4 (ref 7.35–7.45)
PLATELET # BLD: 119 K/UL (ref 130–400)
PMV BLD AUTO: 10.8 FL (ref 9.4–12.4)
PO2 ARTERIAL: 100 MMHG (ref 80–100)
POTASSIUM REFLEX MAGNESIUM: 4.9 MMOL/L (ref 3.5–5)
POTASSIUM, WHOLE BLOOD: 4.3
PROTHROMBIN TIME: 19.4 SEC (ref 12–14.6)
RBC # BLD: 4.3 M/UL (ref 4.7–6.1)
SODIUM BLD-SCNC: 134 MMOL/L (ref 136–145)
TOTAL PROTEIN: 4.6 G/DL (ref 6.6–8.7)
WBC # BLD: 7.7 K/UL (ref 4.8–10.8)

## 2020-12-17 PROCEDURE — 6370000000 HC RX 637 (ALT 250 FOR IP): Performed by: PHYSICIAN ASSISTANT

## 2020-12-17 PROCEDURE — 36415 COLL VENOUS BLD VENIPUNCTURE: CPT

## 2020-12-17 PROCEDURE — 99223 1ST HOSP IP/OBS HIGH 75: CPT | Performed by: NURSE PRACTITIONER

## 2020-12-17 PROCEDURE — 82947 ASSAY GLUCOSE BLOOD QUANT: CPT

## 2020-12-17 PROCEDURE — 99356 PR PROLONGED SVC I/P OR OBS SETTING 1ST HOUR: CPT | Performed by: NURSE PRACTITIONER

## 2020-12-17 PROCEDURE — 2700000000 HC OXYGEN THERAPY PER DAY

## 2020-12-17 PROCEDURE — 80053 COMPREHEN METABOLIC PANEL: CPT

## 2020-12-17 PROCEDURE — 2580000003 HC RX 258: Performed by: HOSPITALIST

## 2020-12-17 PROCEDURE — 6360000002 HC RX W HCPCS: Performed by: HOSPITALIST

## 2020-12-17 PROCEDURE — 85025 COMPLETE CBC W/AUTO DIFF WBC: CPT

## 2020-12-17 PROCEDURE — 85610 PROTHROMBIN TIME: CPT

## 2020-12-17 PROCEDURE — 36600 WITHDRAWAL OF ARTERIAL BLOOD: CPT

## 2020-12-17 PROCEDURE — 99232 SBSQ HOSP IP/OBS MODERATE 35: CPT | Performed by: INTERNAL MEDICINE

## 2020-12-17 PROCEDURE — 82803 BLOOD GASES ANY COMBINATION: CPT

## 2020-12-17 PROCEDURE — 84132 ASSAY OF SERUM POTASSIUM: CPT

## 2020-12-17 RX ORDER — LACTULOSE 10 G/15ML
20 SOLUTION ORAL 2 TIMES DAILY
Status: DISCONTINUED | OUTPATIENT
Start: 2020-12-17 | End: 2020-12-17 | Stop reason: HOSPADM

## 2020-12-17 RX ADMIN — MEROPENEM 1 G: 1 INJECTION, POWDER, FOR SOLUTION INTRAVENOUS at 10:00

## 2020-12-17 RX ADMIN — LACTULOSE 20 G: 20 SOLUTION ORAL at 10:42

## 2020-12-17 RX ADMIN — ASPIRIN 81 MG: 81 TABLET, COATED ORAL at 10:35

## 2020-12-17 RX ADMIN — MEROPENEM 1 G: 1 INJECTION, POWDER, FOR SOLUTION INTRAVENOUS at 01:11

## 2020-12-17 RX ADMIN — MORPHINE SULFATE 15 MG: 15 TABLET, FILM COATED, EXTENDED RELEASE ORAL at 10:35

## 2020-12-17 RX ADMIN — OXYCODONE 5 MG: 5 TABLET ORAL at 17:17

## 2020-12-17 ASSESSMENT — ENCOUNTER SYMPTOMS
SHORTNESS OF BREATH: 1
ABDOMINAL PAIN: 1
EYES NEGATIVE: 1

## 2020-12-17 ASSESSMENT — PAIN SCALES - GENERAL
PAINLEVEL_OUTOF10: 8
PAINLEVEL_OUTOF10: 7

## 2020-12-17 NOTE — PROGRESS NOTES
PROGRESS NOTE    Pt Name: Cain Isabel  MRN: [id number]  YOB: 1970  Date of evaluation: 12/17/2020    Subjective Patient is upset that it took too long for him to have test performed. I had a lengthy discussion with him this morning. I explained to him that he is not a candidate for palliative chemotherapy. He acknowledged understanding. The patient is a unfortunate 52years old male who has a diagnosis of recurrent esophageal adenocarcinoma with liver metastasis. He has a very poor performance status, physical deconditioning and severe cachexia. He was admitted with complaints of increased fatigue, decreased activity and failure to thrive. He had an appoint for port placement for consideration of chemotherapy. Unfortunately, his performance status it is really poor and I do not believe he is a candidate for further palliative treatment at this time. Palliative care has been consulted. Ultrasound showed multiple hepatic masses.     Cancer history  Cain Isabel is a 54 y.o. male managed with primary and secondary diagnoses as outlined:  · Adenocarcinoma of the distal esophagus made by endoscopy on 9/27/2019. · Tumor screening and health maintenance     Regarding his diagnosis of distal esophageal adenocarcinoma, Anirudh Marks received 9 cycles of neoadjuvant weekly carboplatinum/Taxol 10/28/2019-12/30/2019. XRT was completed 12/24/2019 for 1228 treatment fractions.     On 3/3/2020 Dr Pauline Lackey a Bronchoscopy,EGD,Exploratory laparotomy, Right thoracotomy with mobilization of the esophagus, Ligation of the thoracic duct, Three-field esophagectomy with cervical anastomosis, Pyloroplasty, and Jejunostomy feeding tube placement.     He has required esophageal dilatation.     Moi was evaluated in the clinic 11/12/2020 in routine follow-up at which time he stated he had been doing very well postoperatively up until a few weeks ago.    He reported nausea, abdominal discomfort, early satiety, unexplained weight loss, back pain and feeling poorly in general.  He had lost at least 8 pounds over the prior 6 months. His main complaint is pain over the right flank area radiating to the front.     Labs 11/12/2020 included an elevated CEA of 81.5 and elevated LFTs. CT scan of the chest, abdomen/pelvis were requested for further evaluation and completed this morning, 11/19/2020.     I received a phone call from radiologist, Dr. Kaba Eye reported findings concerning for metastatic disease.     Rob was contacted by Kylah Chapman RN and was requested to come to the clinic to discuss findings. Rob stated he had no one to bring him to the clinic. Rosaline Childress is very weak and declined presenting to the nearest emergency room.     Rob was agreeable for virtual visit which is performed at this time.           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 Taty Valencia was seen in initial oncology consultation on 10/9/2019 with a new diagnosis of adenocarcinoma of the distal esophagus referred by Dr. Liz Mccurdy for further work-up, care coordination and treatment decisions.     Rob states that 20 years ago he had an episode when a large piece of chicken got stuck in his throat requiring removal.  Since that time he has had occasional dysphasia problems but nothing significant.  When symptoms began several weeks prior to presentation, he initially thought it was something similar and for that reason did not seek immediate evaluation.     On 9/18/2019, Rob presented to [facility] ED with a complaint of 3 to 4 weeks of difficulty swallowing and approximately 20 pound weight loss reported. He was seen in the emergency room by Faviola Whitehead PA-C working with Dr. Miguel Villagran.    3 weeks prior to presentation he began feeling pain with swallowing solid foods.  He switched to a soft food diet.  He noted that he was still able to tolerate liquids and denied vomiting.  He stated that with swallowing however the food seemed to get caught in his mid chest area.  After instructions on diet etc., he was encouraged to follow-up with a PCP and consideration for GI follow-up as well thereafter.  A list of physicians was provided for him to review and pick a provider.     Britany Avery was evaluated by Oumar HUANG on 9/25/2019 reporting that by this time he was already having problems with liquids in addition to the solid and soft foods previously described at the [facility] ED. A referral was made for GI evaluation.     Mr. Van was seen by REINA Weems with Dr. Michael Bay at the GI clinic on 9/26/2019 for evaluation of the above complaints.  Arrangements were made for an EGD.     On 9/27/2019 Dr. Michael Bay performed an upper GI endoscopy and biopsy. Findings were as follows:  · A large fungating mass with no stigmata of recent bleeding in the lower third of the esophagus at 33 cm from the incisors. · The mass was completely obstructing and circumferential  · A pediatric endoscope was unable to be passed. · Biopsies were taken for histology.   · Magnolia-colored mucosa was present and extended from the mass up to 28 cm     Pathology documented invasive poorly differentiated adenocarcinoma with signet ring cell differentiation, focally arising in the background of Willingham's esophagus.    Focal high-grade glandular dysplasia was also seen along with chronic mild inflammation.     CT scan of the chest with contrast on 9/27/2019 identified the following:  · Wall thickening of the mid to distal third of the esophagus position approximately 5 cm below the level of the leoncio  · Nonspecific subcentimeter paraesophageal lymph nodes  · No pathologic mediastinal or hilar adenopathy noted  · No visible  Dr. Tonny Rodriguez was still unable to pass the  scope through the deployed stent signifying the severity of the stricture. Amedeo Nails was a large amount of fluid emanating from the stomach which was suctioned through the scope.  Photographs were obtained.     On 10/9/2019 Mr. Van was initially evaluated in this office.  His examination did not disclose palpable supraclavicular lymphadenopathy nor acute or worrisome abdominal findings related to the above diagnosis.     Serology on 10/9/2019 documented a normal CEA of 2.6 with an elevated CA 19-9 of 72.2     PET/CT on 10/14/2019 documented abnormal FDG uptake in the mid to distal esophagus with an SUV of 10.63 compatible with the diagnosis of esophageal adenocarcinoma  Notation was made of the fact that the esophageal stent was located completely within the stomach.     Radiation oncology consultation by Dr. Luana Ren and the [facility] was performed on 10/23/2019. 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 The plan was discussed with Dr. Luana Ren with plans for neoadjuvant concurrent XRT with weekly Carboplatin and Taxol chemotherapy based on the  [facility] experience.     Mr. Van was evaluated by Dr. Narayan Monte at [facility] in [place] on 10/31/2019.    Dr. Bhakta Polaris assessment and recommendation was to proceed with neoadjuvant chemotherapy and XRT to be followed by surgical resection. Hakan Smith desires to see Mr. Van upon completion of neoadjuvant therapy.     Results still pending:  · Serology for Guardant 360 molecular markers  · Path to [facility] for NGS, PDL 1 and MSI  · Consideration may also need to be given for MRI of the abdomen with and without contrast to further evaluate the liver.  Will await surgical opinion by Dr. Delroy Coello at [address] [place] [address].     Weekly Carboplatin at an AUC of 2 with Taxol 50 mg/m² initiated 10/28/2019 and the final cycle #9 delivered on 12/30/2019     XRT was initiated on 11/12/2019 He completed 28 treatment fractions on 12/24/2019.      CT chest, abdomen with contrast at [address] [place] [address] on 1/24/2020 documented:  · Patient's known esophageal adenocarcinoma somewhat poorly defined, but subjectively appears decreased in size since 9/27/2019. · Slight decrease in size of periesophageal lymph node  · No evidence of new or worsening disease in chest  · No findings to suggest metastatic disease in abdomen      On 3/3/2020 Dr Delroy Coello performed the following procedures at [address] [place] [address]:  1. Bronchoscopy  2. EGD  3. Exploratory laparotomy   4. Right thoracotomy with mobilization of the esophagus  5. Ligation of the thoracic duct  6. Three-field esophagectomy with cervical anastomosis   7. Pyloroplasty  8. Jejunostomy feeding tube     Operative findings:  · Normal endobronchial anatomy  · Near complete obstruction at 32 cm   · Willingham's esophagus extending up to 27 cm  · No evidence of cancer or mass on retroflexion in the stomach  · Esophagus extremely stuck to the mediastinum and no clear planes were present near the tumor     Pathology revealed the following:     Esophagus, esophagectomy:   · Minimal residual invasive adenocarcinoma (0.2 cm)  · All margins negative for carcinoma   · Treatment effect present   · 18 lymph nodes, negative for carcinoma       Lymph node, level 7, excision:  · One lymph node, negative for carcinoma  Bone. Sixth rib right, excision:  · Bone and marrow elements with no significant histopathologic change     On 3/7/2020 Dr. Delroy Coello performed a bronchoscopy with bronchoalveolar lavage and found a large mucous plug occluding the left mainstem bronchus. Irrigation and suction performed and irrigant sent for culture.   Culture revealed:  1. MRSA  2. E-coli  3. Candida albicans  4. AFB showed no growth at 6 weeks     On 3/10/2020 Bryant Weber performed a bronchoscopy which revealed a large mucous plug including the left mainstem bronchus. This was completely removed and irrigated. No evidence of plugging on the right.      He was treated at [facility] with vancomycin for the MRSA while hospitalized.   Afsaneh Duran is pleased with the outcome of surgery and the results of pathology. He does not desire further chemotherapy. No further chemotherapy will be recommended.         TREATMENT SUMMARY:  1. Weekly Carboplatin at an AUC of 2 with Taxol 50 mg/m² initiated 10/28/2019 and the final cycle #9 delivered on 12/30/2019  2.  XRT was initiated on 11/12/2019, he completed 28 treatment fractions on 12/24/2019.  3. On 3/3/2020 Dr Jasmeet Pitts a Bronchoscopy, EGD, Exploratory laparotomy, Right thoracotomy with mobilization of the esophagus, Ligation of the thoracic duct, Three-field esophagectomy with cervical anastomosis, Pyloroplasty, and Jejunostomy feeding tube placement.       REVIEW OF SYSTEMS:   CONSTITUTIONAL: no fever, no night sweats, fatigue; loss, decreased activity  HEENT: no blurring of vision, no double vision, no hearing difficulty, no tinnitus, no ulceration, no dysplasia, no epistaxis;  LUNGS: no cough, no hemoptysis, no wheeze,  no shortness of breath;  CARDIOVASCULAR: no palpitation, no chest pain, no shortness of breath;  GI: Right upper quadrant abdominal pain, no nausea, no vomiting, no diarrhea, no constipation;  RANDI: no dysuria, no hematuria, no frequency or urgency, no nephrolithiasis;  MUSCULOSKELETAL: Muscle weakness, no joint pain, no swelling, no stiffness;  ENDOCRINE: no polyuria, no polydipsia, no cold or heat intolerance;  HEMATOLOGY: no easy bruising or bleeding, no history of clotting disorder;  DERMATOLOGY: no skin rash, no eczema, no pruritus;  PSYCHIATRY: no depression, no anxiety, no panic attacks, no suicidal ideation, no homicidal ideation;  NEUROLOGY: no syncope, no seizures, no numbness or tingling of hands, no numbness or tingling of feet, no paresis;    Objective   /69   Pulse 104   Temp 97.2 °F (36.2 °C) (Temporal)   Resp 16   Ht 5' 10\" (1.778 m)   Wt 115 lb (52.2 kg)   SpO2 93%   BMI 16.50 kg/m²     PHYSICAL EXAM:  CONSTITUTIONAL: Alert, appropriate, no acute distress, cachectic, ill-appearing  EYES: Non icteric, EOM intact, pupils equal round   ENT: Mucus membranes moist, no oral pharyngeal lesions, external inspection of ears and nose are normal  NECK: Supple, no masses. No palpable thyroid mass  CHEST/LUNGS: CTA bilaterally, normal respiratory effort   CARDIOVASCULAR: RRR, no murmurs. No lower extremity edema  ABDOMEN: soft non-tender, active bowel sounds, no HSM. No palpable masses  EXTREMITIES: Muscle wasting's,full ROM in all 4 extremities, no focal weakness.   SKIN: warm, dry with no rashes or lesions  LYMPH: No cervical, clavicular, axillary, or inguinal lymphadenopathy  NEUROLOGIC: follows commands, non focal         LABORATORY RESULTS REVIEWED BY ME:  Recent Labs     12/17/20  0458 12/16/20  0359 12/15/20  1638   WBC 7.7 7.1 8.5   HGB 13.8* 13.8* 17.1   HCT 41.5* 40.2* 49.4   MCV 96.5* 93.7 93.7   * 140 211       Lab Results   Component Value Date     (L) 12/17/2020    K 4.9 12/17/2020    CL 97 (L) 12/17/2020    CO2 26 12/17/2020    BUN 46 (H) 12/17/2020    CREATININE 0.6 12/17/2020    GLUCOSE 85 12/17/2020    CALCIUM 10.8 (H) 12/17/2020    PROT 4.6 (L) 12/17/2020    LABALBU 2.7 (L) 12/17/2020    BILITOT 8.0 (H) 12/17/2020    ALKPHOS 1,111 (H) 12/17/2020     (H) 12/17/2020     (H) 12/17/2020    LABGLOM >60 12/17/2020    GFRAA >59 12/17/2020    AGRATIO 1.7 11/12/2020    GLOB 2.8 12/09/2020       Lab Results   Component Value Date    INR 1.62 (H) 12/17/2020    INR 1.62 (H) 12/16/2020    INR 2.10 (H) 12/15/2020    PROTIME 19.4 (H) 12/17/2020    PROTIME 19.4 (H) 12/16/2020    PROTIME 23.9 (H) 12/15/2020       RADIOLOGY STUDIES REVIEWED BY ME:  Ct Chest W Contrast     Result Date: 11/19/2020  Examination. CT CHEST W CONTRAST 11/19/2020 8:55 AM History: History of esophageal malignancy. Abnormal weight loss. DLP: 258 mGycm. The CT scan of the chest is performed after intravenous contrast enhancement. The images are acquired in axial plane with subsequent reconstruction in coronal and sagittal planes. The comparison is made with the previous study dated 9/27/2019. The lungs bilaterally are moderately well expanded. There are several small, subcentimeter, ill-defined pulmonary nodules bilaterally which was not seen in the previous study. No dominant nodule/mass. No areas of focal consolidation or infiltrate. The included part of the neck show no discrete mass or lymphadenopathy. The thyroid is suboptimally visualized and evaluated. There is a poorly defined mass in the left proximal paratracheal area below the level of the thyroid gland which was not seen in the previous study. It measures 2.8 x 2.2 cm. It encases the left common carotid artery and proximal part of the left subclavian and proximal left vertebral artery. Atheromatous changes of thoracic aorta are seen. No aneurysmal dilatation or dissection. Normal opacification of pulmonary arteries and the branches. No filling defects. Evidence of previous esophagus replacement surgery. There is no evidence of mediastinal or hilar mass or lymphadenopathy. There are ill-defined density/debris in the distal trachea and the proximal mainstem bronchi which may represent retained mucus or aspirated material?. The included part of the liver show a large low-density area/masses in the liver which was not seen in the previous study. This may be further reviewed and report with CT scan of the abdomen. The images reviewed in bone window show an ill-defined area of bony sclerosis involving the posterior part of the vertebra T2.  No extends into the pedicles. This was noted in the previous study without a significant change. The remaining bones appear unremarkable. The visualized ribs are unremarkable.     Multiple pulmonary nodules which are not seen in the previous study represent metastatic disease. Metastatic hepatic lesions be further discussed with CT scan of the abdomen. A left-sided neck mass below the left lobe of the thyroid gland as detailed above. This was not seen in the previous study. This may represent a metastatic lesion. The source is not certain. An ill-defined density of sclerosis involving the posterior aspect of the vertebra T2. Possibility of a metastatic focus is not excluded. Further follow-up is suggested. Other findings as above. Signed by Dr Bon An on 11/19/2020 11:22 AM     Ct Abdomen Pelvis W Iv Contrast Additional Contrast? Oral     Result Date: 11/19/2020  CT ABDOMEN PELVIS W IV CONTRAST 11/19/2020 8:57 AM HISTORY: C15.9 COMPARISON: CT scan dated 9/27/2019. DLP: 241 mGy cm TECHNIQUE: Following the uneventful administration of intravenous iodinated contrast, helical CT tomographic images of the abdomen and pelvis were acquired. Coronal reformatted images were also provided for review. Automated exposure control was also utilized to decrease patient radiation dose. FINDINGS: LIVER: Xsx-cpwcfhoq-mn-count hypoenhancing cystic lesions throughout the left hepatic lobe with several additional hypoenhancing lesions identified in the right hepatic lobe. The liver has increased in size as result of these lesions with expansion of the left hepatic lobe. Hepatic vein and portal vein compression is identified. BILIARY SYSTEM: The gallbladder is unremarkable. No intrahepatic or extrahepatic ductal dilatation. PANCREAS: No focal pancreatic lesion. SPLEEN: Unremarkable. KIDNEYS AND ADRENALS: Moderate right hydroureteronephrosis. Left kidney is unremarkable. RETROPERITONEUM: No mass, lymphadenopathy or hemorrhage.  GI TRACT: Esophagectomy with gastric pull-up. Oral contrast in the stomach. The stomach is nondistended. The small bowel is nondilated. Moderate colonic stool. No inflammatory changes identified along the bowel. OTHER: There is no mesenteric mass, lymphadenopathy or fluid collection. The abdominopelvic vasculature is patent. The osseous structures and soft tissues demonstrate no worrisome lesions. PELVIS: 1.4 cm rounded enhancing nodule along the right pelvic sidewall in the region of the right distal ureter (series 3-image [id number] and series 604-image 63). The urinary bladder is normal in appearance.     1. Thp-tnwuashz-ww-count hypoenhancing/cystic liver lesions, especially in the left hepatic lobe. Findings suggest liver metastatic disease. 2. There is a moderate right hydroureteronephrosis. A 1.4 cm rounded enhancing nodules seen along the right pelvic sidewall in the region of the right distal ureter. This appears to be an obstructing metastatic lesion along the distal ureter, adenopathy or soft tissue implant. Obstruction may be a cause for the subjective back pain. No osteolytic/ destructive lesions seen along the lumbar spine. The results of this exam were discussed with Wing Sorensen on 11/19/2020 at 1207 hours Signed by Dr Marielle Mckeon on 11/19/2020 12:08 PM     Us Gallbladder Ruq     Result Date: 12/16/2020  EXAMINATION: [address] HISTORY: Increasing bilirubin. COMPARISON: CT abdomen pelvis dated November 19, 2020. PROCEDURE: Real-time sonographic evaluation of the right upper quadrant was performed. Multiple representative images were obtained and archived to PACS for review. FINDINGS:  PANCREAS: Not well visualized. LIVER: Multiple masses are seen in the liver, better seen on CT abdomen pelvis dated November 19, 2020. GALLBLADDER AND BILE DUCTS: The gallbladder is not well visualized. There is no intra-or extrahepatic biliary ductal dilatation.  The common bile duct is non-dilated and measures 4 condition.     1. Successful ultrasound guided fine-needle aspiration of left neck mass. Patient agrees to follow up with referring clinician for biopsy results. Signed by Dr Roxana Ferro on 11/30/2020 11:40 AM     Pet Ct Skull Base To Mid Thigh     Result Date: 11/30/2020  EXAMINATION:  PET CT SKULL BASE TO MID THIGH  11/30/2020 2:05 PM HISTORY: Esophageal cancer post esophagectomy. COMPARISON: PET/CT imaging dated 10/14/2019. CT chest abdomen pelvis dated 11/19/2020 TECHNIQUE: 14.42 mCi of F-18 FDG was administered and PET CT imaging obtained. PET CT fusion images were generated. Blood glucose level equals 104 mg/dL. Isai Sanchez FINDINGS: Interval development of extensive diffuse metastatic disease is appreciated. There are extensive liver metastases. Largest lesion involving the majority of the left lobe demonstrates a maximum intensity of 13.89 SUV. There is a hypermetabolic lesion in the superior mediastinum, described on recent CT examination, to the left of the trachea is hypermetabolic with a maximum intensity of 6.28 SUV. There is increased PET scan activity identified in the subcarinal middle mediastinal node with a maximum intensity of 3.32 SUV. Early Metastatic lymphadenopathy considered. There are extensive hypermetabolic osseous metastases, too many to count. Metastatic lesions involve sternum, right scapula and proximal humerus. Multiple vertebral body lesions and rib lesions identified. Extensive lesions involving the pelvic bones appreciated, largest noted in the right ilium with a maximum intensity of 9.65 SUV measurement.     1. Extensive hypermetabolic metastatic disease particularly involving the liver and osseous structures.  Signed by Dr Chante Hood on 11/30/2020 2:19 PM        My interpretation: Bone Metastasis, liver metastasis      ASSESSMENT:  #Stage IV esophageal cancer (liver metastasis, bone metastasis (  Very poor performance status, liver metastasis with visceral crisis and elevated AST/ALT

## 2020-12-17 NOTE — DISCHARGE SUMMARY
Marcia Moreira  :  1970  MRN:  [id number]    Admit date:  12/15/2020  Discharge date:  2020    Discharging Physician: Raven Matthew MD    Advance Directive: [facility]    Consults: Dr. Florecita Bryant; Ottis Litten, APRN-Palliative care    Primary Care Physician:  No primary care provider on file. Discharge Diagnoses: Active Problems:    Esophageal adenocarcinoma (HCC)    Hyperkalemia    Hypercalcemia    Dehydration    Hyponatremia    Hypotension    Protein-calorie malnutrition, severe (HCC)    Severe malnutrition (HCC)    Palliative care patient    Metastatic malignant neoplasm ([facility] [address]    FTT (failure to thrive) in adult  Resolved Problems:    * No resolved hospital problems. *    Portions of this note have been copied forward, however, changed to reflect the most current clinical status of this patient. Hospital Course: The patient is a 55 y. o. 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 He went today for a follow up with General surgery to schedule Mediport placement and G tube or J tube and appeared lethargic and was recommended to report to ED for evaluation. Work up concerning for worsening LFT's, hyperkalemia, hypercalcemia, hypotension. Mr. Mo Hernandez is unable to provide history due to lethargy. States \"I just hurt all over. \"  Significant other present in room states he has had little oral intake and has just been able to have liquids for taking medications. She also states they have discussed palliative care/code status but patient unable to make a decision at this time. Mr. Mo Hernandez was admitted to Hospitalist service. Consult placed to palliative/spiritual care as well as Oncology. Aggressive IVF hydration continued. On 2020 patient noted to be hypoxic and has required Oxygen via nasal cannula at 2L.  Hospice care consulted and at this time Mr. Van and his family have requested patient be made DNR and would like to discharge home with hospice care as soon as possible. Significant Diagnostic Studies:   Us Gallbladder Ruq  1. Multiple hepatic masses, known. 2. No evidence of dilatation of the biliary system to suggest an obstruction. Signed by Dr Lamin Macdonald on 12/16/2020 8:06 AM    Xr Chest Portable  1. No radiographic evidence of acute cardiopulmonary process. No visualized infiltrate. Signed by Dr Myrna Colindres on 12/15/2020 6:28 PM    Pertinent Labs:   CBC:   Recent Labs     12/15/20  1638 12/16/20  0359 12/17/20  0458   WBC 8.5 7.1 7.7   HGB 17.1 13.8* 13.8*    140 119*     BMP:    Recent Labs     12/15/20  1753 12/15/20  2004 12/15/20  2004 12/16/20  0359 12/17/20 0458 12/17/20  1025   *  --   --  132* 134*  --    K 6.1*  --    < > 5.0 4.9 4.3   CL 87*  --   --  87* 97*  --    CO2 27  --   --  33* 26  --    BUN 71*  --   --  61* 46*  --    CREATININE 1.2  --   --  1.0 0.6  --    GLUCOSE 107 357  --  114* 85  --     < > = values in this interval not displayed. INR:   Recent Labs     12/15/20  1645 12/16/20  0359 12/17/20  0458   INR 2.10* 1.62* 1.62*     Physical Exam:  Vital Signs: /75   Pulse 103   Temp 97.4 °F (36.3 °C) (Temporal)   Resp 14   Ht 5' 10\" (1.778 m)   Wt 115 lb (52.2 kg)   SpO2 91%   BMI 16.50 kg/m²   General appearance: frail, cachectic, no acute distress  HEENT: Normocephalic. Scleral icterus Temporal wasting  Chest: diminished at lower bases otherwise clear to auscultation bilaterally without wheezes or rhonchi. Cardiac: Normal heart tones with regular rate and rhythm, S1, S2 normal. No murmurs, gallops, or rubs auscultated. Abdomen:scaphoid, moderate diffuse tenderness   Extremities: No clubbing or cyanosis. No peripheral edema. Peripheral pulses palpable.  Muscular atrophy   Neurologic: Generalized weakness, confused at times     Discharge Medications:       [facility] Home Medication Instructions [place]:[id number]    Printed on:12/17/20 2467   Medication Information                      aspirin 81 MG EC tablet  Take 81 mg by mouth daily             morphine (MS CONTIN) 15 MG extended release tablet  Take 1 tablet by mouth 2 times daily for 30 days. ondansetron (ZOFRAN) 4 MG tablet  Take 1 tablet by mouth 3 times daily as needed for Nausea or Vomiting             oxyCODONE-acetaminophen (PERCOCET) 7.5-325 MG per tablet  Take 1 tablet by mouth every 6 hours as needed for Pain for up to 30 days. Intended supply: 30 days             promethazine (PHENERGAN) 12.5 MG tablet  Take 1 tablet by mouth 4 times daily as needed for Nausea               Discharge Instructions: Follow up with PCP/hospice provider upon discharge. Take medications as directed. Resume activity as tolerated. Diet: DIET CLEAR LIQUID;  Dietary Nutrition Supplements: Clear Liquid Oral Supplement     Disposition: Patient will be discharged home with hospice care. Time spent on discharge >50 minutes spent in assessing patient, reviewing medications, discussion with nursing, confirming safe discharge plan and preparation of discharge summary.     Signed:  April Fernandez PA-C

## 2020-12-17 NOTE — PROGRESS NOTES
[facility]    Patient:  Juwan Oneill  YOB: 1970  Date of Service: 12/17/2020  MRN: [id number]   Acct: [de-identified]   Primary Care Physician: No primary care provider on file. Advance Directive: Full Code  Admit Date: 12/15/2020       Hospital Day: 2  Portions of this note have been copied forward, however, changed to reflect the most current clinical status of this patient. CHIEF COMPLAINT Failure to thrive    SUBJECTIVE: Mr. aCrlo Guido is confused this morning. Believes it is Mehdi morning. Oxygen saturation decreased to 85% on room air this morning. Patient complains of pain all over but states this is unchanged. Denies dyspnea, chest pain. Cumulative Hospital Course: The patient is a 52 y.o. 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 He went today for a follow up with General surgery to schedule Mediport placement and G tube or J tube and appeared lethargic and was recommended to report to ED for evaluation. Work up concerning for worsening LFT's, hyperkalemia, hypercalcemia, hypotension. Mr. Carlo Guido is unable to provide history due to lethargy. States \"I just hurt all over. \"  Significant other present in room states he has had little oral intake and has just been able to have liquids for taking medications. She also states they have discussed palliative care/code status but patient unable to make a decision at this time. Mr. Carlo Guido was admitted to Hospitalist service. Consult placed to palliative/spiritual care as well as Oncology. Aggressive IVF hydration continued. Review of Systems:   14 point review of systems is negative except as specifically addressed above.     Objective:   VITALS:  BP 97/76   Pulse 99   Temp 97.3 °F (36.3 °C) (Temporal)   Resp 15   Ht 5' 10\" (1.778 m)   Wt 115 lb (52.2 kg)   SpO2 100%   BMI 16.50 kg/m²   24HR INTAKE/OUTPUT:      Intake/Output Summary (Last 24 hours) at 12/17/2020 1014  Last data filed at 12/17/2020 0344  Gross per 24 hour   Intake 1536.67 ml   Output 300 ml   Net 1236.67 ml     General appearance: 52year old male, ill appearing, malnourished   Head: Normocephalic, without obvious abnormality, atraumatic, temporal wasting  Eyes: conjunctivae/corneas clear. PERRL, EOM's intact. Scleral icterus   Ears: normal external ears and nose, throat without exudate  Neck: no adenopathy, no carotid bruit, no JVD, supple, symmetrical, trachea midline   Lungs: coarse air exchange no rhonchi or wheezing    Heart: regular rate and rhythm, S1, S2 normal, no murmur  Abdomen: scaphoid, moderate diffuse tenderness, no rebound or guarding   Extremities: No lower extremity edema,  No erythema, no tenderness to palpation, muscular atrophy  Skin: jaundice, poor turgor   Lymphatic: No palpable lymph node enlargment  Neurologic: Alert and oriented X self and year, not place, believes it is Mehdi day and is concerned that he's missing it with his family, generalized weakness and normal tone.  No focal deficits  Psychiatric: Awake, confused      Medications:      dextrose      sodium chloride 200 mL/hr at 12/16/20 0834      lactulose  20 g Oral BID    insulin lispro  0-12 Units Subcutaneous TID WC    insulin lispro  0-6 Units Subcutaneous Nightly    meropenem (MERREM) 1 g in SWFI 20 mL IV syringe  1 g Intravenous Q8H    aspirin  81 mg Oral Daily    morphine  15 mg Oral BID    sodium chloride flush  10 mL Intravenous 2 times per day     glucose, dextrose, glucagon (rDNA), dextrose, oxyCODONE, naloxone, sodium chloride flush, polyethylene glycol, acetaminophen **OR** acetaminophen, ondansetron, promethazine  DIET CLEAR LIQUID;  Dietary Nutrition Supplements: Clear Liquid Oral Supplement     Lab and other Data:     Recent Labs     12/15/20  1638 12/16/20  0359 12/17/20  0458   WBC 8.5 7.1 7.7   HGB 17.1 13.8* 13.8*    140 119*     Recent Labs     12/15/20  1753 12/15/20  2004 12/15/20  2158 12/16/20 0359 12/17/20  0458   *  --   --  132* 134*   K 6.1*  --  5.5* 5.0 4.9   CL 87*  --   --  87* 97*   CO2 27  --   --  33* 26   BUN 71*  --   --  61* 46*   CREATININE 1.2  --   --  1.0 0.6   GLUCOSE 107 357  --  114* 85     Recent Labs     12/15/20  1753 12/16/20  0359 12/17/20  0458   * 337* 302*   * 172* 164*   BILITOT 6.8* 6.6* 8.0*   ALKPHOS 1,428* 1,188* 1,111*     INR:   Recent Labs     12/15/20  1645 12/16/20 0359 12/17/20 0458   INR 2.10* 1.62* 1.62*     UA:  Recent Labs     12/15/20  1929   COLORU DARK YELLOW*   PHUR 5.0   WBCUA 0-1   RBCUA 2-4   BACTERIA None Seen*   CLARITYU Clear   SPECGRAV 1.022   LEUKOCYTESUR TRACE*   UROBILINOGEN 2.0*   BILIRUBINUR MODERATE*   BLOODU Negative   GLUCOSEU Negative     A1C:   Recent Labs     12/16/20 0359   LABA1C 5.6     RAD:   Us Gallbladder Ruq  1. Multiple hepatic masses, known. 2. No evidence of dilatation of the biliary system to suggest an obstruction. Signed by Dr Mitch Chang on 12/16/2020 8:06 AM    Xr Chest Portable  1. No radiographic evidence of acute cardiopulmonary process. No visualized infiltrate. Signed by Dr Golden Bello on 12/15/2020 6:28 PM    Assessment/Plan   Active Problems:    Esophageal adenocarcinoma ([facility] [address] w/ failure to thrive/hypotension/dehydration-continue aggressive IVF resuscitation. Oxygen saturation decreased this AM, slightly bradycardic. O2 placed. Spouse on her way to discuss transitioning to Hospice care/DNR. Patient unable to make decision for himself this morning due to confusion. Will add Lactulose to medication regimen. Check ABGs      Severe protein calorie malnutrition-dietary following, oral intake encouraged as tolerated      Hyperkalemia-received D50, insulin, bicarb in ED.  REsolved      Hypercalcemia-received Zometa, improving      Hyponatremia-resolving with IVF's    DVT Prophylaxis: Lovenox    Emerald Lyman PA-C

## 2020-12-17 NOTE — DISCHARGE INSTR - DIET

## 2020-12-17 NOTE — PROGRESS NOTES
12/17/2020 10:26 AM - Maldonado Odom Incoming Lab Results From [facility]    Component Value Ref Range & Units Status Collected Lab   pH, Arterial 7.400  7.350 - 7.450 Final 12/17/2020 10:25 AM [facility]   pCO2, Arterial 43.0  35.0 - 45.0 mmHg Final 12/17/2020 10:25 AM [facility]   pO2, Arterial 100.0  80.0 - 100.0 mmHg Final 12/17/2020 10:25 AM [facility]   HCO3, Arterial 26. 6High   22.0 - 26.0 mmol/L Final 12/17/2020 10:25 AM [facility] Excess, Arterial 1.4  -2.0 - 2.0 mmol/L Final 12/17/2020 10:25 AM [facility]   Hemoglobin, Art, Extended 14.2  14.0 - 18.0 g/dL Final 12/17/2020 10:25 AM [facility]   O2 Sat, Arterial 95.6  >92 % Final 12/17/2020 10:25 AM [facility]   Carboxyhgb, Arterial 2.7  0.0 - 5.0 % Final 12/17/2020 10:25 AM [facility]        0.0-1.5   (Smokers 1.5-5.0)    Methemoglobin, Arterial 0.8  <1.5 % Final 12/17/2020 10:25 AM [facility]   O2 Content, Arterial 19.2  Not Established mL/dL Final 12/17/2020 10:25 AM [facility]   O2 Therapy Unknown   Final 12/17/2020 10:25 AM [facility]       Left Radial, +MAT, 100% on 6 LPM  O2 was turn back to 2 LPM after SPO2 showed 100%

## 2020-12-17 NOTE — CONSULTS
Palliative Care Consult Note  12/17/2020 11:19 AM  Subjective:   Admit Date: 12/15/2020  PCP: No primary care provider on file. Reason For Consult: Goals of care, Code status, Family Support    Requesting Physician: Bell Gao PA-C    History Obtained From: EMR, Oncology, attending, pt/pt family    Chief Complaint: Abd pain    History of Present Illness: Patient is a 51-year-old male with PMH of esophageal adenocarcinoma with metastasis to lymph nodes, liver, and bones, who presented to the ED on 12/15/2020 due to FTT, decreased oral intake, hypotension, and worsening fatigue. He had initially presented to general surgery to schedule Mediport placement and G-tube or J-tube but appeared lethargic and was recommended to be evaluated in the ED. Work-up in the ED concerning for worsening LFTs, hyperkalemia, hypercalcemia, hypotension. Patient reported pain \"all over\" and he was admitted for further evaluation and treatment, has been receiving aggressive IVF hydration. Oncology consulted and following, unfortunately patient has very poor performance status, advanced cachexia, and is not a candidate for further anticancer therapy. Palliative care was consulted to assist with further discussions regarding goals of care, CODE STATUS, family support.     Past Medical History:        Diagnosis Date    Esophageal mass     GERD (gastroesophageal reflux disease)     Hx of blood clots     DVT post op    Malignant neoplasm of esophagus, unspecified ([facility] [address]     Esophageal Adenocarcinoma    Palliative care patient 12/17/2020    PTSD (post-traumatic stress disorder)     no diagnosed       Past Surgical History:        Procedure Laterality Date    ABDOMINAL EXPLORATION SURGERY      ANTERIOR CRUCIATE LIGAMENT REPAIR Right     x 2    ERCP  10/21/2019    Dr AMADOU Wild-w/placement of a fully covered SEMS 23mm x 12.cm esophageal stent    ESOPHAGECTOMY      OTHER SURGICAL HISTORY      feeding tube placement    THORACOTOMY      UPPER GASTROINTESTINAL ENDOSCOPY N/A 10/2/2019    Dr AMADOU Garcia/Successful placement of a 23 mm x 12.5 cm fully covered self-expanding esophageal Wallstent under fluoroscopic guidance    UPPER GASTROINTESTINAL ENDOSCOPY N/A 10/21/2019    Dr AMADOU Garcia/Successful placement of a 23 mm x 12.5 cm fully covered self-expanding esophageal Wallstent under fluoroscopic guidance    UPPER GASTROINTESTINAL ENDOSCOPY  10/21/2019    Dr AMADOU Garcia/Successful placement of a 23 mm x 12.5 cm fully covered self-expanding esophageal Wallstent under fluoroscopic guidance    UPPER GASTROINTESTINAL ENDOSCOPY N/A 10/28/2019    Dr Teja Garcia/Removal of previously migrated esophageal stent x2       Allergies:  Eggs or egg-derived products    Social History:    TOBACCO:   reports that he has been smoking. He has a 8.50 pack-year smoking history. He has quit using smokeless tobacco.  ETOH:   reports no history of alcohol use. MARITAL STATUS:    Patient currently lives with family     Family History:       Problem Relation Age of Onset    Lung Cancer Father 40       Palliative Performance Score: 30%    Review of Systems   Constitutional: Positive for activity change, appetite change and fatigue. Generalized weakness   HENT: Negative. Eyes: Negative. Respiratory: Positive for shortness of breath. Cardiovascular: Negative. Gastrointestinal: Positive for abdominal pain. Endocrine: Positive for cold intolerance. Genitourinary: Negative. Musculoskeletal: Positive for myalgias. Skin: Negative. Neurological: Negative. Psychiatric/Behavioral: Negative.         Palliative Review of Advance Directives:     Surrogate Decision Maker:yes, spouse as NOK    Durable Power of :no    Advanced Directives/Living [place]: no    Out of hospital medical orders in place to reflect resuscitation status (MOLST/POLST): no    Information Sharing:  Patient's awareness of illness:  [x] Terminal [] Life-Threatening [] Serious [] Non life-threatening [] Not serious   [] Not discussed    Family awareness of illness:   [x] Terminal [] Life-Threatening [] Serious [] Nonlife-threatening [] Not serious   [] Not discussed    DIET CLEAR LIQUID;  Dietary Nutrition Supplements: Clear Liquid Oral Supplement    Medications:   dextrose      sodium chloride 200 mL/hr at 12/16/20 0834     Current Facility-Administered Medications   Medication Dose Route Frequency Provider Last Rate Last Admin    lactulose (CHRONULAC) 10 GM/15ML solution 20 g  20 g Oral BID Georgie Boas, PA-C   20 g at 12/17/20 1042    insulin lispro (HUMALOG) injection vial 0-12 Units  0-12 Units Subcutaneous TID WC Kip Renee MD        insulin lispro (HUMALOG) injection vial 0-6 Units  0-6 Units Subcutaneous Nightly Kip Renee MD        glucose (GLUTOSE) 40 % oral gel 15 g  15 g Oral PRN Kip Renee MD        dextrose 50 % IV solution  12.5 g Intravenous PRN Kip Renee MD        glucagon (rDNA) injection 1 mg  1 mg Intramuscular PRN Kip Renee MD        dextrose 5 % solution  100 mL/hr Intravenous ISABELL Renee MD        0.9 % sodium chloride infusion   Intravenous Continuous Zina Schilder,  mL/hr at 12/16/20 0834 New Bag at 12/16/20 0834    aspirin EC tablet 81 mg  81 mg Oral Daily Georgie Boas, PA-C   81 mg at 12/17/20 1035    morphine (MS CONTIN) extended release tablet 15 mg  15 mg Oral BID Georgie Boas, PA-C   15 mg at 12/17/20 1035    oxyCODONE (ROXICODONE) immediate release tablet 5 mg  5 mg Oral Q6H PRN Georgie Boas, PA-C        naloxone [facility]) injection 0.4 mg  0.4 mg Intravenous PRN Georgie Boas, PA-C        sodium chloride flush 0.9 % injection 10 mL  10 mL Intravenous 2 times per day Georgie Boas, PA-C        sodium chloride flush 0.9 % injection 10 mL  10 mL Intravenous PRN Georgie Boas, PA-C        polyethylene glycol (GLYCOLAX) packet 17 g  17 g Oral Daily PRN Martin Moreno PA-C        acetaminophen (TYLENOL) tablet 650 mg  650 mg Oral Q6H PRN Martin Moreno PA-C        Or    acetaminophen (TYLENOL) suppository 650 mg  650 mg Rectal Q6H PRN Martin Moreno PA-C        [facility]dansetron [facility]) injection 4 mg  4 mg Intravenous Q6H PRN Martin Moreno PA-C        promethazine (PHENERGAN) injection 12.5 mg  12.5 mg Intravenous Q6H PRN Martin Moreno PA-C            Labs:     Recent Labs     12/15/20  1638 12/16/20 0359 12/17/20  0458   WBC 8.5 7.1 7.7   RBC 5.27 4.29* 4.30*   HGB 17.1 13.8* 13.8*   HCT 49.4 40.2* 41.5*   MCV 93.7 93.7 96.5*   MCH 32.4* 32.2* 32.1*   MCHC 34.6 34.3 33.3    140 119*     Recent Labs     12/15/20  1753 12/15/20  2004 12/15/20  2004 12/16/20  0359 12/17/20  0458 12/17/20  1025   *  --   --  132* 134*  --    K 6.1*  --    < > 5.0 4.9 4.3   ANIONGAP 15  --   --  12 11  --    CL 87*  --   --  87* 97*  --    CO2 27  --   --  33* 26  --    BUN 71*  --   --  61* 46*  --    CREATININE 1.2  --   --  1.0 0.6  --    GLUCOSE 107 357  --  114* 85  --    CALCIUM 12.6*  --   --  12.1* 10.8*  --     < > = values in this interval not displayed. No results for input(s): MG, PHOS in the last 72 hours. Recent Labs     12/15/20  1753 12/16/20  0359 12/17/20  0458   * 337* 302*   * 172* 164*   BILITOT 6.8* 6.6* 8.0*   ALKPHOS 1,428* 1,188* 1,111*     ABGs:  Recent Labs     12/17/20  1025   PHART 7.400   CCY2LHO 43.0   PO2ART 100.0   CMW4TXU 26.6*   BEART 1.4   HGBAE 14.2   C6DTMRSA 95.6   CARBOXHGBART 2.7   02THERAPY Unknown     HgBA1c:   Recent Labs     12/16/20  0359   LABA1C 5.6     FLP:  No results found for: TRIG, HDL, LDLCALC, LDLDIRECT, LABVLDL  TSH:    Lab Results   Component Value Date    TSH 1.530 11/12/2020     Troponin T: No results for input(s): TROPONINI in the last 72 hours.   INR:   Recent Labs     12/15/20  1645 12/16/20  0359 12/17/20  0458   INR 2.10* 1.62* 1.62*       Objective:   Vitals: BP 97/76 Pulse 99   Temp 97.3 °F (36.3 °C) (Temporal)   Resp 15   Ht 5' 10\" (1.778 m)   Wt 115 lb (52.2 kg)   SpO2 100%   BMI 16.50 kg/m²   24HR INTAKE/OUTPUT:      Intake/Output Summary (Last 24 hours) at 12/17/2020 1119  Last data filed at 12/17/2020 0344  Gross per 24 hour   Intake 1536.67 ml   Output 300 ml   Net 1236.67 ml     Physical Exam      General appearance: alert, cachectic,appears chronically ill, no acute distress  HEENT: atraumatic, eyes with scleral icterus, normal lids, pupils and irises normal, external ears and nose are normal,lips normal.  Neck: without masses, supple   Lungs: no increased work of breathing, clear to auscultation bilaterally, nasal cannula O2 in place  Heart: regular rate and rhythm and S1, S2 normal  Abdomen: diffuse tenderness, BS present  Genitourinary: No bladder fullness, masses, or tenderness  Extremities: atraumatic, no edema, thin extremities with muscle wasting  Neurologic: Alert and oriented to self, situation, has had intermittent confusion, nonfocal  Psychiatric: Alert, some confusion, no agitation or anxiety  Skin: warm, dry, jaundiced    Assessment and Plan: Active Problems:    Esophageal adenocarcinoma (HCC)    Hyperkalemia    Hypercalcemia    Dehydration    Hyponatremia    Hypotension    Protein-calorie malnutrition, severe (HCC)    Severe malnutrition (HCC)    Palliative care patient  Resolved Problems:    * No resolved hospital problems. *      Visit Summary: I saw the patient at the bedside with his spouse and sister present. He is resting in bed, appears very frail and weak, but is alert and conversant. Although he does answer some questions appropriately, he does have a degree of confusion. I did review palliative medicine services and reason for consult. We discussed information from oncology and patient's inability to have further treatments, poor performance status, and recommendation for hospice care.   Patient does appear to have some anger and has plenty right now. I readdressed code status and spouse has requested DNR status for the patient. I have notified the attending and Oncology. Nursing staff aware of the outcome of my visit. Patient to discharge home with hospice care. Additional 30 min spent discussing goals, code status, and coordination of care with hospice and providers. Recommendations:   1. Discussed and encouraged high consideration for hospice as options are limited at this point. 2. Encouraged and recommended DNR code status     Thank you for consulting palliative care and allowing us to participate in the care of the patient. CounselingTopics: Goals of care, Code Status, Disease process education, pt/family support    Time Spent Counseling > 50%:    Yes                                       Electronically signed by REINA Rojas on 12/17/2020 at 11:19 AM    (Please note that portions of this note were completed with a voice recognition program.  Efforts were made to edit the dictations but occasionally words are mis-transcribed.)

## 2020-12-22 ENCOUNTER — HOSPITAL ENCOUNTER (OUTPATIENT)
Dept: INFUSION THERAPY | Age: 50
End: 2020-12-22

## 2021-01-14 PROBLEM — E86.0 DEHYDRATION: Status: RESOLVED | Noted: 2020-12-15 | Resolved: 2021-01-14

## (undated) DEVICE — MILLER LARYNGOSCOPE BLADE, 2, CL: Brand: N.A.

## (undated) DEVICE — ENDO KIT,LOURDES HOSPITAL: Brand: MEDLINE INDUSTRIES, INC.

## (undated) DEVICE — CONMED SCOPE SAVER BITE BLOCK, 20X27 MM: Brand: SCOPE SAVER

## (undated) DEVICE — TBG SMPL FLTR LINE NASL 02/C02 A/ BX/100

## (undated) DEVICE — SYRINGE INFL 60ML DISP ALLIANCE II

## (undated) DEVICE — SENSR O2 OXIMAX FNGR A/ 18IN NONSTR

## (undated) DEVICE — Device: Brand: DEFENDO AIR/WATER/SUCTION AND BIOPSY VALVE

## (undated) DEVICE — DILATOR ENDOSCP L240CM BLLN L55CM DIA8 9 10MM CATHETER 75FR

## (undated) DEVICE — THE DISPOSABLE RAPTOR GRASPING DEVICE IS USED TO GRASP TISSUE AND/OR RETRIEVE FOREIGN BODIES, EXCISED TISSUE AND STENTS DURING ENDOSCOPIC PROCEDURES.: Brand: RAPTOR

## (undated) DEVICE — CLIP LIG L235CM RESOL 360 BX/20

## (undated) DEVICE — CUFF,BP,DISP,1 TUBE,ADULT,HP: Brand: MEDLINE

## (undated) DEVICE — GUIDEWIRE ENDOSCP STR 0.035 INX260 CM STIFF RND DREAMWIRE ST

## (undated) DEVICE — ENDOGATOR AUXILIARY WATER JET CONNECTOR: Brand: ENDOGATOR

## (undated) DEVICE — YANKAUER,BULB TIP WITH VENT: Brand: ARGYLE

## (undated) DEVICE — THE CHANNEL CLEANING BRUSH IS A NYLON FLEXI BRUSH ATTACHED TO A FLEXIBLE PLASTIC SHEATH DESIGNED TO SAFELY REMOVE DEBRIS FROM FLEXIBLE ENDOSCOPES.

## (undated) DEVICE — RETRIEVAL BALLOON CATHETER: Brand: EXTRACTOR™ PRO RX

## (undated) DEVICE — FORCEPS ENDOSCP L230CM WRK CHN 2.8CM SHTH 2.4MM JAW OPN

## (undated) DEVICE — SYSTEM BX CAP BILI RAP EXCHG CAP LOK DEV COMPATIBLE W/ OLY